# Patient Record
Sex: FEMALE | Race: WHITE | NOT HISPANIC OR LATINO | Employment: FULL TIME | URBAN - METROPOLITAN AREA
[De-identification: names, ages, dates, MRNs, and addresses within clinical notes are randomized per-mention and may not be internally consistent; named-entity substitution may affect disease eponyms.]

---

## 2017-01-30 ENCOUNTER — OFFICE VISIT (OUTPATIENT)
Dept: INTERNAL MEDICINE | Facility: CLINIC | Age: 32
End: 2017-01-30

## 2017-01-30 VITALS
SYSTOLIC BLOOD PRESSURE: 115 MMHG | HEIGHT: 65 IN | BODY MASS INDEX: 24.66 KG/M2 | WEIGHT: 148 LBS | TEMPERATURE: 98.8 F | HEART RATE: 83 BPM | DIASTOLIC BLOOD PRESSURE: 80 MMHG | OXYGEN SATURATION: 98 %

## 2017-01-30 DIAGNOSIS — D23.71 DERMATOFIBROMA OF LOWER LEG, RIGHT: ICD-10-CM

## 2017-01-30 DIAGNOSIS — F41.0 PANIC ATTACK: ICD-10-CM

## 2017-01-30 DIAGNOSIS — G43.109 MIGRAINE WITH AURA AND WITHOUT STATUS MIGRAINOSUS, NOT INTRACTABLE: ICD-10-CM

## 2017-01-30 DIAGNOSIS — F41.1 GAD (GENERALIZED ANXIETY DISORDER): ICD-10-CM

## 2017-01-30 DIAGNOSIS — E55.9 AVITAMINOSIS D: ICD-10-CM

## 2017-01-30 DIAGNOSIS — Z00.00 HEALTHCARE MAINTENANCE: Primary | ICD-10-CM

## 2017-01-30 PROBLEM — D23.70 DERMATOFIBROMA OF LOWER LEG: Status: ACTIVE | Noted: 2017-01-30

## 2017-01-30 PROCEDURE — 99395 PREV VISIT EST AGE 18-39: CPT | Performed by: INTERNAL MEDICINE

## 2017-01-30 PROCEDURE — 90656 IIV3 VACC NO PRSV 0.5 ML IM: CPT | Performed by: INTERNAL MEDICINE

## 2017-01-30 PROCEDURE — 90471 IMMUNIZATION ADMIN: CPT | Performed by: INTERNAL MEDICINE

## 2017-01-30 RX ORDER — DICLOFENAC POTASSIUM 50 MG/1
1 POWDER, FOR SOLUTION ORAL DAILY PRN
Qty: 1 EACH | Refills: 5
Start: 2017-01-30 | End: 2018-10-31

## 2017-01-30 NOTE — PROGRESS NOTES
"Annual Exam (review of medical issues )      HPI  Carito Ritter is a 31 y.o. female RTC in yearly CPE, review of medical issues:  1. Anxiety - started Effexor at last visit.  First 2 weeks had some fatigue and \"vision thing\" but all resolved at this point.  Used 4 or 5 doses of Ativan, \"especially that first week\".  \"I will always be an anxious person\".  Feels like doing better and notes that start of Effexor has \"been a good thing\".  No more panic attacks since start of med.   2. Migraine [place] - saw Dr. Beauchamp at the end of 2016.  Is on Cambia PRN at time of HA. \"Works really well about 90% of the time\".  On no daily meds for Ppx at this time.  Migraine frequency has been still holding at about 2x/ month.    3. Vitamin D deficiency - noted in past.  Off all vitamins at this time.        Review of Systems   Constitution: Negative for chills, fever, malaise/fatigue, weight gain and weight loss.   HENT: Positive for headaches. Negative for congestion, hearing loss, hoarse voice, odynophagia and sore throat.    Eyes: Negative for discharge, double vision, pain and redness.        Last eye exam today; wears glasses   Cardiovascular: Negative for chest pain, dyspnea on exertion, irregular heartbeat, leg swelling, near-syncope, palpitations and syncope.   Respiratory: Negative for cough and shortness of breath.    Skin: Positive for suspicious lesions (on B legs, \"knot underneath skin\".  Feel like bug bite, do not resolve. ). Negative for rash.   Musculoskeletal: Negative for joint pain, joint swelling, muscle cramps and muscle weakness.   Gastrointestinal: Negative for constipation, diarrhea, dysphagia, heartburn, nausea and vomiting.   Genitourinary: Negative for bladder incontinence, dysuria, frequency, hematuria and urgency.   Neurological: Negative for dizziness and light-headedness.   Psychiatric/Behavioral: Negative for depression. The patient is nervous/anxious. The patient does not have insomnia.        Problem " List:    Patient Active Problem List   Diagnosis   • DAPHNE (generalized anxiety disorder)   • Avitaminosis D   • Panic attack   • Healthcare maintenance   • Migraine without aura and responsive to treatment   • Dermatofibroma of lower leg   • Migraine with aura and without status migrainosus, not intractable       Medical History:    Past Medical History   Diagnosis Date   • Generalized anxiety disorder    • Hx of hordeolum      recurrent in L eye   • Migraine headache    • Vitamin D deficiency         Social History:    Social History     Social History   • Marital status:      Spouse name: N/A   • Number of children: 2   • Years of education: N/A     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     Social History Main Topics   • Smoking status: Never Smoker   • Smokeless tobacco: Not on file   • Alcohol use Yes      Comment: <1 wine week   • Drug use: No   • Sexual activity: Yes     Partners: Male      Comment:  only; no hx STD's     Other Topics Concern   • Not on file     Social History Narrative    Diet - overall healthy; eats fruits and veggies    Exercise - None       Family History:   Family History   Problem Relation Age of Onset   • Asthma Mother    • Hyperthyroidism Father    • Nephrolithiasis Father    • Diabetes Maternal Grandmother    • Lung cancer Maternal Grandfather    • Breast cancer Paternal Grandmother    • Diabetes Paternal Grandmother    • Hyperthyroidism Paternal Grandmother    • ADD / ADHD Son        Surgical History:   Past Surgical History   Procedure Laterality Date   • Breast surgery       cyst aspiration Right breast 2012   • Eye surgery       lasik   • External ear surgery       ear pining 2013 cosmetic   • Tonsillectomy and adenoidectomy       2005         Current Outpatient Prescriptions:   •  LORazepam (ATIVAN) 0.5 MG tablet, Take 1 tablet by mouth Every 8 (Eight) Hours As Needed for anxiety., Disp: 30 tablet, Rfl: 0  •  Melatonin 3 MG capsule, Take 1 capsule by  mouth., Disp: , Rfl:   •  venlafaxine XR (EFFEXOR XR) 150 MG 24 hr capsule, Take 1 capsule by mouth Daily., Disp: 30 capsule, Rfl: 5  •  Diclofenac Potassium 50 MG pack, Take 1 capsule by mouth Daily As Needed (migraine)., Disp: 1 each, Rfl: 5    Vitals:    01/30/17 1140   BP: 115/80   Pulse: 83   Temp: 98.8 °F (37.1 °C)   SpO2: 98%       Physical Exam   Constitutional: She is oriented to person, place, and time. She appears well-developed and well-nourished. No distress.   HENT:   Head: Normocephalic and atraumatic.   Right Ear: Hearing, tympanic membrane, external ear and ear canal normal.   Left Ear: Hearing, tympanic membrane, external ear and ear canal normal.   Nose: Nose normal.   Mouth/Throat: Uvula is midline, oropharynx is clear and moist and mucous membranes are normal.   Eyes: Conjunctivae, EOM and lids are normal. Pupils are equal, round, and reactive to light.   Neck: Trachea normal, normal range of motion and full passive range of motion without pain. Neck supple. Carotid bruit is not present. No thyroid mass and no thyromegaly present.   Cardiovascular: Normal rate, regular rhythm, S1 normal, S2 normal, normal heart sounds and intact distal pulses.  Exam reveals no gallop and no friction rub.    No murmur heard.  Pulses:       Radial pulses are 2+ on the right side, and 2+ on the left side.        Dorsalis pedis pulses are 2+ on the right side, and 2+ on the left side.        Posterior tibial pulses are 2+ on the right side, and 2+ on the left side.   Pulmonary/Chest: Effort normal and breath sounds normal. No respiratory distress. She has no wheezes. She has no rhonchi. She has no rales.   Abdominal: Soft. Normal appearance and bowel sounds are normal. She exhibits no distension and no mass. There is no hepatosplenomegaly. There is no tenderness. There is no rebound and no guarding.   Musculoskeletal: Normal range of motion. She exhibits no edema.       Vascular Status -  Her exam exhibits right  foot vasculature abnormal and no right foot edema. Her exam exhibits left foot vasculature abnormal and no left foot edema.  Lymphadenopathy:     She has no cervical adenopathy.     She has no axillary adenopathy.        Right: No inguinal adenopathy present.        Left: No inguinal adenopathy present.   Neurological: She is alert and oriented to person, place, and time. She has normal strength and normal reflexes. She displays no tremor. No cranial nerve deficit or sensory deficit. She exhibits normal muscle tone. Gait normal.   Skin: Skin is warm and dry. No rash noted.        Psychiatric: She has a normal mood and affect. Her behavior is normal. Cognition and memory are normal.   Vitals reviewed.      Assessment/ Plan  Diagnoses and all orders for this visit:    Healthcare maintenance  -     UA / M With / Rflx Culture([facility] ONLY)    Panic attack    Migraine with aura and without status migrainosus, not intractable  -     Diclofenac Potassium 50 MG pack; Take 1 capsule by mouth Daily As Needed (migraine).    Avitaminosis D    DAPHNE (generalized anxiety disorder)    Dermatofibroma of lower leg, right    Other orders  -     Melatonin 3 MG capsule; Take 1 capsule by mouth.  -     Flu Vaccine Greater Than or Equal To 4yo Preservative Free IM        Return in about 1 year (around 1/30/2018) for Annual physical.      Discussion:  Carito Ritter is a 31 y.o. female RTC in yearly CPE, review of medical issues:  1. Anxiety - recurrence of anxiety in 2016 with new job, with prior diagnosis n past (with primary sx of SOA with (-) CT scan chest and PFT's and resolution with anxiety tx); recurrence with panic attacks. Doing well on Effexor with only mild initiation side effects, now resolved. B/P OK today.  Rare use of Ativan at this point and pt is pleased with progress. C/W same meds for now.     2. Migraine HA -  S/p Neuro eval with Dr. Beauchamp at the end of 2016.Cambia PRN with good results with abortive strategy.  Recalls intolerance to daily Ppx meds in past.  3. Vitamin D deficiency - trend levels with next labs.   4. Recurrent hordeolum - saw optho today for re-eval. Noted think cornea and scar tissue in eyelids on L.    5. Dermatofibroma - noted on B legs with classic evolution noted on R leg lesion.  Reassured pt.   6. HM - labs d/w pt; Flu - today; Tdap UTD 2015; Pap/ Breast Exam UTD 12/2016 with Gyn; add exercise.     RTC one year CPE, F labs prior

## 2017-01-30 NOTE — MR AVS SNAPSHOT
Carito Ritter   1/30/2017 11:20 AM   Office Visit    Dept Phone:  [phone]   Encounter #:  [id number]    Provider:  Karl Snyder MD   Department:  [facility] INTERNAL MEDICINE                Your Full Care Plan              Today's Medication Changes          These changes are accurate as of: 1/30/17 12:36 PM.  If you have any questions, ask your nurse or doctor.               New Medication(s)Ordered:     Diclofenac Potassium 50 MG pack   Take 1 capsule by mouth Daily As Needed (migraine).   Started by:  Karl Snyder MD         Medication(s)that have changed:     venlafaxine  MG 24 hr capsule   Commonly known as:  EFFEXOR XR   Take 1 capsule by mouth Daily.   What changed:  Another medication with the same name was removed. Continue taking this medication, and follow the directions you see here.   Changed by:  Cynthia Arboleda MA         Stop taking medication(s)listed here:     sulfamethoxazole-trimethoprim 800-160 MG per tablet   Commonly known as:  BACTRIM DS,SEPTRA DS   Stopped by:  Karl Snyder MD                Where to Get Your Medications      Information about where to get these medications is not yet available     ! Ask your nurse or doctor about these medications     Diclofenac Potassium 50 MG pack                  Your Updated Medication List          This list is accurate as of: 1/30/17 12:36 PM.  Always use your most recent med list.                Diclofenac Potassium 50 MG pack   Take 1 capsule by mouth Daily As Needed (migraine).       LORazepam 0.5 MG tablet   Commonly known as:  ATIVAN   Take 1 tablet by mouth Every 8 (Eight) Hours As Needed for anxiety.       Melatonin 3 MG capsule       venlafaxine  MG 24 hr capsule   Commonly known as:  EFFEXOR XR   Take 1 capsule by mouth Daily.               We Performed the Following     Flu Vaccine Greater Than or Equal To 4yo Preservative Free IM     UA / M With / Rflx Culture([facility]  ONLY)       You Were Diagnosed With        Codes Comments    Healthcare maintenance    -  Primary ICD-10-CM: Z00.00  ICD-9-CM: V70.0     Panic attack     ICD-10-CM: F41.0  ICD-9-CM: 300.01     Migraine with aura and without status migrainosus, not intractable     ICD-10-CM: G43.109  ICD-9-CM: 346.00     Avitaminosis D     ICD-10-CM: E55.9  ICD-9-CM: 268.9     DAPHNE (generalized anxiety disorder)     ICD-10-CM: F41.1  ICD-9-CM: 300.02     Dermatofibroma of lower leg, right     ICD-10-CM: D23.71  ICD-9-CM: 216.7       Instructions     None    Patient Instructions History      Upcoming Appointments     Visit Type Date Time Department    PHYSICAL 1/30/2017 11:20 AM [facility]    [facility] 1/26/2018  8:10 AM [facility]      MyChart Signup     Our records indicate that you have an active [email or web address] account.    You can view your After Visit Summary by going to [email or web address] and logging in with your [employer] username and password.  If you don't have a [employer] username and password but a parent or guardian has access to your record, the parent or guardian should login with their own [employer] username and password and access your record to view the After Visit Summary.    If you have questions, you can email [email or web address] or call [phone] to talk to our [employer] staff.  Remember, [employer] is NOT to be used for urgent needs.  For medical emergencies, dial 911.               Other Info from Your Visit           Your Appointments     Jan 26, 2018  8:10 AM EST   [facility] with [facility] YANNA WILSON   [facility] INTERNAL MEDICINE (--)    [address] [id number] [phone]            Feb 02, 2018  3:20 PM EST   Physical with Karl Snyder MD   [facility] INTERNAL MEDICINE (--)    [address] [id number] [phone]           Arrive 15 minutes prior to appointment.              Allergies     No  "Known Allergies      Reason for Visit     Annual Exam review of medical issues       Vital Signs     Blood Pressure Pulse Temperature Height Weight Oxygen Saturation    115/80 83 98.8 °F (37.1 °C) 65\" (165.1 cm) 148 lb (67.1 kg) 98%    Breastfeeding? Body Mass Index Smoking Status             No 24.63 kg/m2 Never Smoker         Problems and Diagnoses Noted     Avitaminosis D    Benign skin tumor    DAPHNE (generalized anxiety disorder)    Routine medical exam    Migraine without aura and responsive to treatment    Panic attack    Migraines            "

## 2017-01-31 LAB
APPEARANCE UR: CLEAR
BACTERIA #/AREA URNS HPF: NORMAL /HPF
BILIRUB UR QL STRIP: NEGATIVE
COLOR UR: YELLOW
EPI CELLS #/AREA URNS HPF: NORMAL /HPF
GLUCOSE UR QL: NEGATIVE
HGB UR QL STRIP: NEGATIVE
KETONES UR QL STRIP: NEGATIVE
LEUKOCYTE ESTERASE UR QL STRIP: NEGATIVE
MICRO URNS: NORMAL
MICRO URNS: NORMAL
MUCOUS THREADS URNS QL MICRO: PRESENT /HPF
NITRITE UR QL STRIP: NEGATIVE
PH UR STRIP: 7 [PH] (ref 5–7.5)
PROT UR QL STRIP: NEGATIVE
RBC #/AREA URNS HPF: NORMAL /HPF
SP GR UR: 1.02 (ref 1–1.03)
URINALYSIS REFLEX: NORMAL
UROBILINOGEN UR STRIP-MCNC: 0.2 MG/DL (ref 0.2–1)
WBC #/AREA URNS HPF: NORMAL /HPF

## 2017-06-15 RX ORDER — VENLAFAXINE HYDROCHLORIDE 150 MG/1
CAPSULE, EXTENDED RELEASE ORAL
Qty: 30 CAPSULE | Refills: 3 | Status: SHIPPED | OUTPATIENT
Start: 2017-06-15 | End: 2017-11-09 | Stop reason: SDUPTHER

## 2017-08-02 ENCOUNTER — TELEPHONE (OUTPATIENT)
Dept: INTERNAL MEDICINE | Facility: CLINIC | Age: 32
End: 2017-08-02

## 2017-08-02 NOTE — TELEPHONE ENCOUNTER
Response is not immediate, but usually in first hour or so of taking med.  May try to double dose to 1mg and see if gets more robust effect.   Call if not working better and can consider alternate options.

## 2017-08-02 NOTE — TELEPHONE ENCOUNTER
Pt is calling and asking about the ativan. She has taken 4 in the last week and she feels like they are taking about 3-4 hours to work and for her anxiety to get better. She is wanting to know if she is taking them wrong or what the release time on this medication is.

## 2017-09-29 ENCOUNTER — OFFICE VISIT (OUTPATIENT)
Dept: INTERNAL MEDICINE | Facility: CLINIC | Age: 32
End: 2017-09-29

## 2017-09-29 VITALS
TEMPERATURE: 98.2 F | SYSTOLIC BLOOD PRESSURE: 122 MMHG | BODY MASS INDEX: 23.16 KG/M2 | DIASTOLIC BLOOD PRESSURE: 80 MMHG | HEIGHT: 65 IN | HEART RATE: 85 BPM | OXYGEN SATURATION: 98 % | WEIGHT: 139 LBS

## 2017-09-29 DIAGNOSIS — G89.29 CHRONIC ABDOMINAL PAIN: ICD-10-CM

## 2017-09-29 DIAGNOSIS — R10.9 CHRONIC ABDOMINAL PAIN: ICD-10-CM

## 2017-09-29 DIAGNOSIS — R10.31 RIGHT LOWER QUADRANT ABDOMINAL PAIN: Primary | ICD-10-CM

## 2017-09-29 LAB
BILIRUB BLD-MCNC: NEGATIVE MG/DL
CLARITY, POC: CLEAR
COLOR UR: YELLOW
GLUCOSE UR STRIP-MCNC: NEGATIVE MG/DL
HCT VFR BLDA CALC: 40.3 %
HGB BLDA-MCNC: 13.2 G/DL
KETONES UR QL: NEGATIVE
LEUKOCYTE EST, POC: NEGATIVE
LYMPHOCYTES # BLD: 35.5 %
MCH, POC: 30.4
MCHC, POC: 32.8
MCV, POC: 92.6
MONOCYTES # BLD: 6.8 %
NITRITE UR-MCNC: NEGATIVE MG/ML
PH UR: 6.5 [PH] (ref 5–8)
PLATELET # BLD: 277 10*3/MM3
PMV BLD: 7.2 FL
POC NEUTROPHIL: 57.7 %
PROT UR STRIP-MCNC: NEGATIVE MG/DL
RBC # UR STRIP: NEGATIVE /UL
RBC, POC: 4.35
RDW, POC: 15.3
SP GR UR: 1.01 (ref 1–1.03)
UROBILINOGEN UR QL: NORMAL
WBC # BLD: 5.4 10*3/UL

## 2017-09-29 PROCEDURE — 85025 COMPLETE CBC W/AUTO DIFF WBC: CPT | Performed by: INTERNAL MEDICINE

## 2017-09-29 PROCEDURE — 81003 URINALYSIS AUTO W/O SCOPE: CPT | Performed by: INTERNAL MEDICINE

## 2017-09-29 PROCEDURE — 99214 OFFICE O/P EST MOD 30 MIN: CPT | Performed by: INTERNAL MEDICINE

## 2017-09-29 PROCEDURE — 74020 XR ABDOMEN FLAT AND UPRIGHT: CPT | Performed by: INTERNAL MEDICINE

## 2017-09-29 NOTE — PROGRESS NOTES
"Abdominal Pain (right lower )      HPI  Carito Ritter is a 32 y.o. female RTC in acute care:    \"I have had this going on for a couple of months\". Feels like \"for a couple weeks each month has R lower pelvic discomfort\" but has gotten worse each month.  Pain peaked last week and could not go to sleep due to pain, \"even after ibuprofen\".  Went to Gyn and had pelvic exam and U/S and \"there was nothing\".   Told to come here and see if can look at issue and will consider birth control if nothing else turns up.  No bowel changes during flare up of pain.  Pain will totally resolve when on period, for ~ 2 weeks.  Pain is like a \"stabbing, comes in waves\". Pain is in R lower part of abdomen only.  No positional nature.  Never had rash over area.  No N/V sx with it.  Not related to food. Pain is \"all day\" consistently when occurring.  Cannot figure out anything that makes it better.   Did \"In Shape\" diet in the last month where used hcg injections and on \"strict diet\" so bowels have been off for the last month. Pain started before ever starting the hcg diet.   Recalls pain was present when had U/S and pressure over area in that region.   Pain is \"not as bad today\" but is present.     Review of Systems   Constitution: Positive for malaise/fatigue (feels poorly when pain occurs.  'Feels drained and crappy:\". ). Negative for chills, decreased appetite and fever.   HENT: Negative for odynophagia.    Skin: Negative for rash.   Gastrointestinal: Positive for change in bowel habit (in last month on hcg diet; is not daily at this point. ). Negative for constipation, diarrhea, dysphagia, heartburn, nausea and vomiting.   Genitourinary: Negative for dysuria, frequency and hematuria.       The following portions of the patient's history were reviewed and updated as appropriate: allergies, current medications, past medical history and problem list.      Current Outpatient Prescriptions:   •  Diclofenac Potassium 50 MG pack, Take 1 " "capsule by mouth Daily As Needed (migraine)., Disp: 1 each, Rfl: 5  •  LORazepam (ATIVAN) 0.5 MG tablet, Take 1 tablet by mouth Every 8 (Eight) Hours As Needed for anxiety., Disp: 30 tablet, Rfl: 0  •  Melatonin 3 MG capsule, Take 1 capsule by mouth., Disp: , Rfl:   •  venlafaxine XR (EFFEXOR-XR) 150 MG 24 hr capsule, TAKE 1 CAPSULE BY MOUTH DAILY, Disp: 30 capsule, Rfl: 3    Vitals:    09/29/17 0847   BP: 122/80   Pulse: 85   Temp: 98.2 °F (36.8 °C)   SpO2: 98%   Weight: 139 lb (63 kg)   Height: 65\" (165.1 cm)         Physical Exam   Constitutional: She is oriented to person, place, and time. She appears well-developed and well-nourished. She does not have a sickly appearance. She does not appear ill. No distress.   HENT:   Head: Normocephalic and atraumatic.   Mouth/Throat: Oropharynx is clear and moist. No oropharyngeal exudate.   Eyes: Conjunctivae are normal. Pupils are equal, round, and reactive to light. Right eye exhibits no discharge. Left eye exhibits no discharge. No scleral icterus.   Cardiovascular: Normal rate, regular rhythm and normal heart sounds.    Pulmonary/Chest: Effort normal and breath sounds normal. No respiratory distress. She has no wheezes. She has no rales.   Abdominal: Soft. Bowel sounds are normal. She exhibits no distension and no mass. There is no hepatomegaly. There is tenderness in the right lower quadrant. There is no rebound, no guarding, no CVA tenderness and negative Fragoso's sign.   Musculoskeletal: Normal range of motion. She exhibits no edema.   Neurological: She is alert and oriented to person, place, and time. No cranial nerve deficit.   Skin: No rash (over torso and back) noted.   Psychiatric: She has a normal mood and affect. Her behavior is normal.   Vitals reviewed.    Results for orders placed or performed in visit on 09/29/17   POC CBC With / Auto Diff   Result Value Ref Range    WBC 5.4     RBC 4.35     Hemoglobin 13.2 g/dL    Hematocrit 40.3 %    MCV 92.6     MCH " "30.4     MCHC 32.8     RDW-CV 15.3     MPV 7.2     Platelets 277 10*3/mm3    Neutrophil Rel % 57.7 %    Monocyte Rel % 6.8 %    Lymphocyte Rel % 35.5 %   POC Urinalysis Dipstick, Automated   Result Value Ref Range    Color Yellow Yellow, Straw, Dark Yellow, Janay    Clarity, UA Clear Clear    Glucose, UA Negative Negative, 1000 mg/dL (3+) mg/dL    Bilirubin Negative Negative    Ketones, UA Negative Negative    Specific Gravity  1.015 1.005 - 1.030    Blood, UA Negative Negative    pH, Urine 6.5 5.0 - 8.0    Protein, POC Negative Negative mg/dL    Urobilinogen, UA Normal Normal    Leukocytes Negative Negative    Nitrite, UA Negative Negative     XR abdomen: RLQ pain, normal pelvix U/S; No prior for comparison  Normal stool and bowel gas pattern  No air fluid levels  No noted masses.     Assessment/ Plan  Diagnoses and all orders for this visit:    Right lower quadrant abdominal pain  -     POC CBC With / Auto Diff  -     POC Urinalysis Dipstick, Automated  -     Comprehensive Metabolic Panel  -     Sedimentation Rate  -     Urine Culture - Urine, Urine, Clean Catch  -     XR Abdomen Flat & Upright (In Office)  -     CT abdomen pelvis w contrast; Future    Chronic abdominal pain  -     CT abdomen pelvis w contrast; Future        Return for Next scheduled follow up.      Discussion:   Carito Ritter is a 32 y.o. female RTC In acute care (new issue to examiner) with > 3 months hx of cyclical (following menstruation cycle) 2 week course of RLQ pain abdominal/ pelvic pain that preceded onset of recent restrictive \"hcg\" diet.  Saw Gyn yesterday and had (-) pelvic U/S and normal exam.  Exam here is benign except noted RLQ pain on deep palpation without rebound or guarding.  I d/w pt that given duration and recent (-) U/S do think the next step is more definitive imaging of A/P. If CT is unrevealing, will need to see GI for endoscopy consideration and R colon lesion is on Ddx. We discussed endometriosis on Ddx though " seems less likely with duration of pain and (-) U/S.  Will f/u with pt via phone after imaging.   F/U after additional labs as noted above.

## 2017-10-01 LAB
ALBUMIN SERPL-MCNC: 5 G/DL (ref 3.5–5.2)
ALBUMIN/GLOB SERPL: 1.7 G/DL
ALP SERPL-CCNC: 42 U/L (ref 39–117)
ALT SERPL-CCNC: 24 U/L (ref 1–33)
AST SERPL-CCNC: 18 U/L (ref 1–32)
BACTERIA UR CULT: NO GROWTH
BACTERIA UR CULT: NORMAL
BILIRUB SERPL-MCNC: 0.5 MG/DL (ref 0.1–1.2)
BUN SERPL-MCNC: 12 MG/DL (ref 6–20)
BUN/CREAT SERPL: 19.7 (ref 7–25)
CALCIUM SERPL-MCNC: 10.2 MG/DL (ref 8.6–10.5)
CHLORIDE SERPL-SCNC: 102 MMOL/L (ref 98–107)
CO2 SERPL-SCNC: 26.9 MMOL/L (ref 22–29)
CREAT SERPL-MCNC: 0.61 MG/DL (ref 0.57–1)
ERYTHROCYTE [SEDIMENTATION RATE] IN BLOOD BY WESTERGREN METHOD: 9 MM/HR (ref 0–20)
GLOBULIN SER CALC-MCNC: 3 GM/DL
GLUCOSE SERPL-MCNC: 77 MG/DL (ref 65–99)
POTASSIUM SERPL-SCNC: 4.2 MMOL/L (ref 3.5–5.2)
PROT SERPL-MCNC: 8 G/DL (ref 6–8.5)
SODIUM SERPL-SCNC: 141 MMOL/L (ref 136–145)

## 2017-10-06 ENCOUNTER — HOSPITAL ENCOUNTER (OUTPATIENT)
Dept: CT IMAGING | Facility: HOSPITAL | Age: 32
Discharge: HOME | End: 2017-10-06
Admitting: INTERNAL MEDICINE

## 2017-10-06 DIAGNOSIS — G89.29 CHRONIC ABDOMINAL PAIN: ICD-10-CM

## 2017-10-06 DIAGNOSIS — R10.9 CHRONIC ABDOMINAL PAIN: ICD-10-CM

## 2017-10-06 DIAGNOSIS — R10.31 RIGHT LOWER QUADRANT ABDOMINAL PAIN: ICD-10-CM

## 2017-10-06 DIAGNOSIS — R10.31 RLQ ABDOMINAL PAIN: Primary | ICD-10-CM

## 2017-10-06 PROCEDURE — 0 IOPAMIDOL 61 % SOLUTION: Performed by: INTERNAL MEDICINE

## 2017-10-06 PROCEDURE — 74177 CT ABD & PELVIS W/CONTRAST: CPT

## 2017-10-06 RX ADMIN — IOPAMIDOL 85 ML: 612 INJECTION, SOLUTION INTRAVENOUS at 07:24

## 2017-10-11 RX ORDER — VENLAFAXINE HYDROCHLORIDE 150 MG/1
CAPSULE, EXTENDED RELEASE ORAL
Qty: 30 CAPSULE | Refills: 0 | Status: SHIPPED | OUTPATIENT
Start: 2017-10-11 | End: 2018-01-30

## 2017-11-09 RX ORDER — VENLAFAXINE HYDROCHLORIDE 150 MG/1
CAPSULE, EXTENDED RELEASE ORAL
Qty: 30 CAPSULE | Refills: 3 | Status: SHIPPED | OUTPATIENT
Start: 2017-11-09 | End: 2018-03-08 | Stop reason: SDUPTHER

## 2017-12-14 ENCOUNTER — OFFICE VISIT (OUTPATIENT)
Dept: GASTROENTEROLOGY | Facility: CLINIC | Age: 32
End: 2017-12-14

## 2017-12-14 VITALS
HEIGHT: 65 IN | SYSTOLIC BLOOD PRESSURE: 116 MMHG | WEIGHT: 135.2 LBS | TEMPERATURE: 98.5 F | BODY MASS INDEX: 22.53 KG/M2 | DIASTOLIC BLOOD PRESSURE: 72 MMHG

## 2017-12-14 DIAGNOSIS — K59.00 CONSTIPATION, UNSPECIFIED CONSTIPATION TYPE: ICD-10-CM

## 2017-12-14 DIAGNOSIS — R10.31 RLQ ABDOMINAL PAIN: Primary | ICD-10-CM

## 2017-12-14 PROCEDURE — 99203 OFFICE O/P NEW LOW 30 MIN: CPT | Performed by: INTERNAL MEDICINE

## 2017-12-14 RX ORDER — CLONAZEPAM 0.5 MG/1
0.5 TABLET ORAL NIGHTLY PRN
COMMUNITY
Start: 2017-12-12 | End: 2019-03-04 | Stop reason: SDUPTHER

## 2017-12-14 NOTE — PROGRESS NOTES
Chief Complaint   Patient presents with   • Abdominal Pain   • Constipation     alt diarrhea   • Nausea     Carito Ritter is a 32 y.o. female who presents with RLQ pain.  Intermittent - comes and goes.  Once it starts it persists for days to week.  Feels like a strong cramp.  Can wake her from sleep.  Worse after a BM.  No blood in stool.  Feels more constipated and then she will have diarrhea once she starts to have BM.  Feelsbloated without BM.  Tried OTC stool softeners without persistant results.  She felt better after using mag citrate once.  Used smooth move tea but it only worked for a little bit.  No FH GI issues.  Lost 20 lbs with effort this past summer.She had a CAT scan done to evaluate her symptoms which was normal.  She also saw a gynecologist and had a transvaginal ultrasound which showed no issues with her ovaries.        HPI  Past Medical History:   Diagnosis Date   • Generalized anxiety disorder    • Hx of hordeolum     recurrent in L eye   • Migraine headache    • Vitamin D deficiency      Past Surgical History:   Procedure Laterality Date   • BREAST SURGERY      cyst aspiration Right breast 2012   • EXTERNAL EAR SURGERY      ear pining 2013 cosmetic   • EYE SURGERY      lasik   • TONSILLECTOMY AND ADENOIDECTOMY      2005       Current Outpatient Prescriptions:   •  clonazePAM (KlonoPIN) 0.5 MG tablet, Take 0.5 mg by mouth., Disp: , Rfl:   •  Diclofenac Potassium 50 MG pack, Take 1 capsule by mouth Daily As Needed (migraine)., Disp: 1 each, Rfl: 5  •  Melatonin 3 MG capsule, Take 1 capsule by mouth., Disp: , Rfl:   •  venlafaxine XR (EFFEXOR-XR) 150 MG 24 hr capsule, TAKE 1 CAPSULE BY MOUTH DAILY, Disp: 30 capsule, Rfl: 0  •  venlafaxine XR (EFFEXOR-XR) 150 MG 24 hr capsule, Take one capsule daily, Disp: 30 capsule, Rfl: 3  •  linaclotide (LINZESS) 72 MCG capsule capsule, Take 1 capsule by mouth Every Morning Before Breakfast., Disp: 30 capsule, Rfl: 5  •  LORazepam (ATIVAN) 0.5 MG tablet,  Take 1 tablet by mouth Every 8 (Eight) Hours As Needed for anxiety., Disp: 30 tablet, Rfl: 0  No Known Allergies  Social History     Social History   • Marital status:      Spouse name: N/A   • Number of children: 2   • Years of education: N/A     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     Social History Main Topics   • Smoking status: Never Smoker   • Smokeless tobacco: Never Used   • Alcohol use Yes      Comment: <1 wine week   • Drug use: No   • Sexual activity: Yes     Partners: Male      Comment:  only; no hx STD's     Other Topics Concern   • Not on file     Social History Narrative    Diet - overall healthy; eats fruits and veggies    Exercise - None     Family History   Problem Relation Age of Onset   • Asthma Mother    • Hyperthyroidism Father    • Nephrolithiasis Father    • Diabetes Maternal Grandmother    • Lung cancer Maternal Grandfather    • Breast cancer Paternal Grandmother    • Diabetes Paternal Grandmother    • Hyperthyroidism Paternal Grandmother    • ADD / ADHD Son      Review of Systems   Constitutional: Negative for appetite change and unexpected weight change.   Gastrointestinal: Positive for abdominal distention, abdominal pain and constipation.   All other systems reviewed and are negative.    Vitals:    12/14/17 0955   BP: 116/72   Temp: 98.5 °F (36.9 °C)     Last Weight    12/14/17  0955   Weight: 61.3 kg (135 lb 3.2 oz)     Physical Exam   Constitutional: She is oriented to person, place, and time. She appears well-developed and well-nourished.   HENT:   Head: Normocephalic and atraumatic.   Eyes: Conjunctivae are normal. No scleral icterus.   Neck: Normal range of motion. Neck supple.   Pulmonary/Chest: Effort normal.   Abdominal: Soft. Bowel sounds are normal. She exhibits distension. There is tenderness.   Mildly distended, tender in the right left lower quadrant.  Focal tenderness just above right hip bone   Musculoskeletal: She exhibits no edema.    Neurological: She is alert and oriented to person, place, and time.   Skin: Skin is warm and dry.   Psychiatric: She has a normal mood and affect.   Nursing note and vitals reviewed.    No images are attached to the encounter.  No notes on file  Carito was seen today for abdominal pain, constipation and nausea.    Diagnoses and all orders for this visit:    RLQ abdominal pain  -     Case Request; Standing  -     Case Request    Constipation, unspecified constipation type    Other orders  -     Implement Anesthesia orders day of procedure.; Standing  -     Obtain informed consent; Standing  -     Verify bowel prep was successful; Standing  -     Give tap water enema if bowel prep was insufficient; Standing  -     linaclotide (LINZESS) 72 MCG capsule capsule; Take 1 capsule by mouth Every Morning Before Breakfast.    Plan-  Trial low-dose Linzess for constipation-she has failed over-the-counter medications  Plan for colonoscopy for further evaluation of her right lower quadrant pain-her CT was normal which is reassuring however she's having fairly persistent pain in a focal location that needs further evaluation.

## 2018-01-05 ENCOUNTER — OFFICE VISIT (OUTPATIENT)
Dept: RETAIL CLINIC | Facility: CLINIC | Age: 33
End: 2018-01-05

## 2018-01-05 VITALS
SYSTOLIC BLOOD PRESSURE: 90 MMHG | RESPIRATION RATE: 18 BRPM | HEART RATE: 90 BPM | DIASTOLIC BLOOD PRESSURE: 70 MMHG | TEMPERATURE: 99.4 F | OXYGEN SATURATION: 98 %

## 2018-01-05 DIAGNOSIS — J02.0 STREP THROAT: Primary | ICD-10-CM

## 2018-01-05 LAB
EXPIRATION DATE: ABNORMAL
INTERNAL CONTROL: ABNORMAL
Lab: ABNORMAL
S PYO AG THROAT QL: POSITIVE

## 2018-01-05 PROCEDURE — 99213 OFFICE O/P EST LOW 20 MIN: CPT | Performed by: NURSE PRACTITIONER

## 2018-01-05 PROCEDURE — 87880 STREP A ASSAY W/OPTIC: CPT | Performed by: NURSE PRACTITIONER

## 2018-01-05 RX ORDER — AMOXICILLIN 875 MG/1
875 TABLET, COATED ORAL EVERY 12 HOURS SCHEDULED
Qty: 20 TABLET | Refills: 0 | Status: SHIPPED | OUTPATIENT
Start: 2018-01-05 | End: 2018-01-15

## 2018-01-05 NOTE — PROGRESS NOTES
Subjective   Patient ID: Carito Ritter is a 32 y.o. female presents with   Chief Complaint   Patient presents with   • Sore Throat     x 3 days   • Fever     100.2       Sore Throat    This is a new problem. The current episode started in the past 7 days (2-3d). The problem has been gradually worsening. The pain is worse on the right side. The maximum temperature recorded prior to her arrival was 100.4 - 100.9 F. The pain is mild. Associated symptoms include trouble swallowing. Pertinent negatives include no congestion or headaches. She has had exposure to strep. She has had no exposure to mono. Treatments tried: cough drops. The treatment provided mild relief.       No Known Allergies    The following portions of the patient's history were reviewed and updated as appropriate: allergies, current medications, past family history, past medical history, past social history, past surgical history and problem list.      Review of Systems   Constitutional: Positive for chills, diaphoresis, fatigue and fever.   HENT: Positive for postnasal drip, sore throat and trouble swallowing. Negative for congestion, rhinorrhea, sinus pain, sinus pressure and sneezing.    Respiratory: Negative.    Cardiovascular: Negative.    Gastrointestinal: Negative.    Neurological: Negative for headaches.       Objective     Vitals:    01/05/18 1716   BP: 90/70   Pulse: 90   Resp: 18   Temp: 99.4 °F (37.4 °C)   SpO2: 98%         Physical Exam   Constitutional: She is oriented to person, place, and time. She appears well-developed and well-nourished. She does not appear ill. No distress.   HENT:   Head: Normocephalic.   Right Ear: Hearing, tympanic membrane, external ear and ear canal normal.   Left Ear: Hearing, tympanic membrane, external ear and ear canal normal.   Nose: No mucosal edema, rhinorrhea or sinus tenderness.   Mouth/Throat: Mucous membranes are normal. Posterior oropharyngeal erythema present. Tonsils are 0 on the right.  Tonsils are 0 on the left. No tonsillar exudate.   Clear blister to left tonsillar area   Eyes: Conjunctivae are normal.   Sclera white.   Neck: No tracheal deviation present.   Cardiovascular: Normal rate, regular rhythm, S1 normal, S2 normal and normal heart sounds.    Pulmonary/Chest: Effort normal and breath sounds normal. No accessory muscle usage. No respiratory distress.   Abdominal: Soft. Bowel sounds are normal. There is no tenderness.   Lymphadenopathy:     She has no cervical adenopathy.   Neurological: She is alert and oriented to person, place, and time.   Skin: Skin is warm and dry.   Vitals reviewed.    Lab Results   Component Value Date    RAPSCRN Positive (A) 01/05/2018         Carito was seen today for sore throat and fever.    Diagnoses and all orders for this visit:    Strep throat  -     POC Rapid Strep A  -     amoxicillin (AMOXIL) 875 MG tablet; Take 1 tablet by mouth Every 12 (Twelve) Hours for 10 days.        Follow-up with Primary Care Physician in 48-72 hours if these symptoms worsen or fail to improve as anticipated. Patient verbalizes understanding.    Strep Throat  Strep throat is a bacterial infection of the throat. Your health care provider may call the infection tonsillitis or pharyngitis, depending on whether there is swelling in the tonsils or at the back of the throat. Strep throat is most common during the cold months of the year in children who are 5-15 years of age, but it can happen during any season in people of any age. This infection is spread from person to person (contagious) through coughing, sneezing, or close contact.  CAUSES  Strep throat is caused by the bacteria called Streptococcus pyogenes.  RISK FACTORS  This condition is more likely to develop in:  · People who spend time in crowded places where the infection can spread easily.  · People who have close contact with someone who has strep throat.  SYMPTOMS  Symptoms of this condition include:  · Fever or chills.     · Redness, swelling, or pain in the tonsils or throat.  · Pain or difficulty when swallowing.  · White or yellow spots on the tonsils or throat.  · Swollen, tender glands in the neck or under the jaw.  · Red rash all over the body (rare).  DIAGNOSIS  This condition is diagnosed by performing a rapid strep test or by taking a swab of your throat (throat culture test). Results from a rapid strep test are usually ready in a few minutes, but throat culture test results are available after one or two days.  TREATMENT  This condition is treated with antibiotic medicine.  HOME CARE INSTRUCTIONS  Medicines  · Take over-the-counter and prescription medicines only as told by your health care provider.  · Take your antibiotic as told by your health care provider. Do not stop taking the antibiotic even if you start to feel better.  · Have family members who also have a sore throat or fever tested for strep throat. They may need antibiotics if they have the strep infection.  Eating and Drinking  · Do not share food, drinking cups, or personal items that could cause the infection to spread to other people.  · If swallowing is difficult, try eating soft foods until your sore throat feels better.  · Drink enough fluid to keep your urine clear or pale yellow.  General Instructions  · Gargle with a salt-water mixture 3-4 times per day or as needed. To make a salt-water mixture, completely dissolve ½-1 tsp of salt in 1 cup of warm water.  · Make sure that all household members wash their hands well.  · Get plenty of rest.  · Stay home from school or work until you have been taking antibiotics for 24 hours.  · Keep all follow-up visits as told by your health care provider. This is important.  SEEK MEDICAL CARE IF:  · The glands in your neck continue to get bigger.  · You develop a rash, cough, or earache.  · You cough up a thick liquid that is green, yellow-brown, or bloody.  · You have pain or discomfort that does not get better  with medicine.  · Your problems seem to be getting worse rather than better.  · You have a fever.  SEEK IMMEDIATE MEDICAL CARE IF:  · You have new symptoms, such as vomiting, severe headache, stiff or painful neck, chest pain, or shortness of breath.  · You have severe throat pain, drooling, or changes in your voice.  · You have swelling of the neck, or the skin on the neck becomes red and tender.  · You have signs of dehydration, such as fatigue, dry mouth, and decreased urination.  · You become increasingly sleepy, or you cannot wake up completely.  · Your joints become red or painful.     This information is not intended to replace advice given to you by your health care provider. Make sure you discuss any questions you have with your health care provider.     Document Released: 12/15/2001 Document Revised: 09/07/2016 Document Reviewed: 04/11/2016  [employer] Patient Education ©2017 [employer]

## 2018-01-05 NOTE — PATIENT INSTRUCTIONS

## 2018-01-25 DIAGNOSIS — Z00.00 HEALTHCARE MAINTENANCE: Primary | ICD-10-CM

## 2018-01-25 DIAGNOSIS — E55.9 AVITAMINOSIS D: ICD-10-CM

## 2018-01-27 LAB
25(OH)D3+25(OH)D2 SERPL-MCNC: 28.7 NG/ML (ref 30–100)
ALBUMIN SERPL-MCNC: 5 G/DL (ref 3.5–5.2)
ALBUMIN/GLOB SERPL: 1.6 G/DL
ALP SERPL-CCNC: 43 U/L (ref 39–117)
ALT SERPL-CCNC: 29 U/L (ref 1–33)
APPEARANCE UR: CLEAR
AST SERPL-CCNC: 18 U/L (ref 1–32)
BACTERIA #/AREA URNS HPF: NORMAL /HPF
BASOPHILS # BLD AUTO: 0.01 10*3/MM3 (ref 0–0.2)
BASOPHILS NFR BLD AUTO: 0.2 % (ref 0–1.5)
BILIRUB SERPL-MCNC: 0.6 MG/DL (ref 0.1–1.2)
BILIRUB UR QL STRIP: NEGATIVE
BUN SERPL-MCNC: 16 MG/DL (ref 6–20)
BUN/CREAT SERPL: 24.2 (ref 7–25)
CALCIUM SERPL-MCNC: 9.8 MG/DL (ref 8.6–10.5)
CHLORIDE SERPL-SCNC: 101 MMOL/L (ref 98–107)
CHOLEST SERPL-MCNC: 178 MG/DL (ref 0–200)
CO2 SERPL-SCNC: 26 MMOL/L (ref 22–29)
COLOR UR: YELLOW
CREAT SERPL-MCNC: 0.66 MG/DL (ref 0.57–1)
EOSINOPHIL # BLD AUTO: 0.05 10*3/MM3 (ref 0–0.7)
EOSINOPHIL NFR BLD AUTO: 0.9 % (ref 0.3–6.2)
EPI CELLS #/AREA URNS HPF: NORMAL /HPF
ERYTHROCYTE [DISTWIDTH] IN BLOOD BY AUTOMATED COUNT: 13.1 % (ref 11.7–13)
GFR SERPLBLD CREATININE-BSD FMLA CKD-EPI: 103 ML/MIN/1.73
GFR SERPLBLD CREATININE-BSD FMLA CKD-EPI: 125 ML/MIN/1.73
GLOBULIN SER CALC-MCNC: 3.2 GM/DL
GLUCOSE SERPL-MCNC: 80 MG/DL (ref 65–99)
GLUCOSE UR QL: NEGATIVE
HCT VFR BLD AUTO: 41.7 % (ref 35.6–45.5)
HDLC SERPL-MCNC: 62 MG/DL (ref 40–60)
HGB BLD-MCNC: 13.1 G/DL (ref 11.9–15.5)
HGB UR QL STRIP: NEGATIVE
IMM GRANULOCYTES # BLD: 0 10*3/MM3 (ref 0–0.03)
IMM GRANULOCYTES NFR BLD: 0 % (ref 0–0.5)
KETONES UR QL STRIP: NEGATIVE
LDLC SERPL CALC-MCNC: 95 MG/DL (ref 0–100)
LEUKOCYTE ESTERASE UR QL STRIP: NEGATIVE
LYMPHOCYTES # BLD AUTO: 2.05 10*3/MM3 (ref 0.9–4.8)
LYMPHOCYTES NFR BLD AUTO: 35.8 % (ref 19.6–45.3)
MCH RBC QN AUTO: 30 PG (ref 26.9–32)
MCHC RBC AUTO-ENTMCNC: 31.4 G/DL (ref 32.4–36.3)
MCV RBC AUTO: 95.4 FL (ref 80.5–98.2)
MICRO URNS: NORMAL
MICRO URNS: NORMAL
MONOCYTES # BLD AUTO: 0.52 10*3/MM3 (ref 0.2–1.2)
MONOCYTES NFR BLD AUTO: 9.1 % (ref 5–12)
MUCOUS THREADS URNS QL MICRO: PRESENT /HPF
NEUTROPHILS # BLD AUTO: 3.1 10*3/MM3 (ref 1.9–8.1)
NEUTROPHILS NFR BLD AUTO: 54 % (ref 42.7–76)
NITRITE UR QL STRIP: NEGATIVE
PH UR STRIP: 6.5 [PH] (ref 5–7.5)
PLATELET # BLD AUTO: 263 10*3/MM3 (ref 140–500)
POTASSIUM SERPL-SCNC: 4.4 MMOL/L (ref 3.5–5.2)
PROT SERPL-MCNC: 8.2 G/DL (ref 6–8.5)
PROT UR QL STRIP: NEGATIVE
RBC # BLD AUTO: 4.37 10*6/MM3 (ref 3.9–5.2)
RBC #/AREA URNS HPF: NORMAL /HPF
SODIUM SERPL-SCNC: 141 MMOL/L (ref 136–145)
SP GR UR: 1.02 (ref 1–1.03)
TRIGL SERPL-MCNC: 106 MG/DL (ref 0–150)
TSH SERPL DL<=0.005 MIU/L-ACNC: 2.85 MIU/ML (ref 0.27–4.2)
URINALYSIS REFLEX: NORMAL
UROBILINOGEN UR STRIP-MCNC: 0.2 MG/DL (ref 0.2–1)
VLDLC SERPL CALC-MCNC: 21.2 MG/DL (ref 5–40)
WBC # BLD AUTO: 5.73 10*3/MM3 (ref 4.5–10.7)
WBC #/AREA URNS HPF: NORMAL /HPF

## 2018-01-31 ENCOUNTER — ANESTHESIA (OUTPATIENT)
Dept: GASTROENTEROLOGY | Facility: HOSPITAL | Age: 33
End: 2018-01-31

## 2018-01-31 ENCOUNTER — ANESTHESIA EVENT (OUTPATIENT)
Dept: GASTROENTEROLOGY | Facility: HOSPITAL | Age: 33
End: 2018-01-31

## 2018-01-31 ENCOUNTER — HOSPITAL ENCOUNTER (OUTPATIENT)
Facility: HOSPITAL | Age: 33
Setting detail: HOSPITAL OUTPATIENT SURGERY
Discharge: HOME | End: 2018-01-31
Attending: INTERNAL MEDICINE | Admitting: INTERNAL MEDICINE

## 2018-01-31 VITALS
SYSTOLIC BLOOD PRESSURE: 111 MMHG | HEIGHT: 65 IN | TEMPERATURE: 97.9 F | RESPIRATION RATE: 14 BRPM | OXYGEN SATURATION: 100 % | HEART RATE: 72 BPM | BODY MASS INDEX: 23.06 KG/M2 | WEIGHT: 138.38 LBS | DIASTOLIC BLOOD PRESSURE: 74 MMHG

## 2018-01-31 DIAGNOSIS — R10.31 RLQ ABDOMINAL PAIN: ICD-10-CM

## 2018-01-31 PROCEDURE — 25010000002 PROPOFOL 10 MG/ML EMULSION: Performed by: ANESTHESIOLOGY

## 2018-01-31 PROCEDURE — 45385 COLONOSCOPY W/LESION REMOVAL: CPT | Performed by: INTERNAL MEDICINE

## 2018-01-31 PROCEDURE — 88305 TISSUE EXAM BY PATHOLOGIST: CPT | Performed by: INTERNAL MEDICINE

## 2018-01-31 RX ORDER — PROPOFOL 10 MG/ML
VIAL (ML) INTRAVENOUS AS NEEDED
Status: DISCONTINUED | OUTPATIENT
Start: 2018-01-31 | End: 2018-01-31 | Stop reason: SURG

## 2018-01-31 RX ORDER — LIDOCAINE HYDROCHLORIDE 20 MG/ML
INJECTION, SOLUTION INFILTRATION; PERINEURAL AS NEEDED
Status: DISCONTINUED | OUTPATIENT
Start: 2018-01-31 | End: 2018-01-31 | Stop reason: SURG

## 2018-01-31 RX ORDER — SODIUM CHLORIDE, SODIUM LACTATE, POTASSIUM CHLORIDE, CALCIUM CHLORIDE 600; 310; 30; 20 MG/100ML; MG/100ML; MG/100ML; MG/100ML
30 INJECTION, SOLUTION INTRAVENOUS CONTINUOUS PRN
Status: DISCONTINUED | OUTPATIENT
Start: 2018-01-31 | End: 2018-01-31 | Stop reason: HOSPADM

## 2018-01-31 RX ORDER — PROPOFOL 10 MG/ML
VIAL (ML) INTRAVENOUS CONTINUOUS PRN
Status: DISCONTINUED | OUTPATIENT
Start: 2018-01-31 | End: 2018-01-31 | Stop reason: SURG

## 2018-01-31 RX ADMIN — LIDOCAINE HYDROCHLORIDE 60 MG: 20 INJECTION, SOLUTION INFILTRATION; PERINEURAL at 10:15

## 2018-01-31 RX ADMIN — SODIUM CHLORIDE, POTASSIUM CHLORIDE, SODIUM LACTATE AND CALCIUM CHLORIDE 30 ML/HR: 600; 310; 30; 20 INJECTION, SOLUTION INTRAVENOUS at 09:59

## 2018-01-31 RX ADMIN — PROPOFOL 100 MCG/KG/MIN: 10 INJECTION, EMULSION INTRAVENOUS at 10:15

## 2018-01-31 RX ADMIN — SODIUM CHLORIDE, POTASSIUM CHLORIDE, SODIUM LACTATE AND CALCIUM CHLORIDE: 600; 310; 30; 20 INJECTION, SOLUTION INTRAVENOUS at 10:15

## 2018-01-31 RX ADMIN — PROPOFOL 100 MG: 10 INJECTION, EMULSION INTRAVENOUS at 10:15

## 2018-01-31 NOTE — ANESTHESIA PREPROCEDURE EVALUATION
Anesthesia Evaluation     Patient summary reviewed and Nursing notes reviewed          Airway   Mallampati: I  TM distance: <3 FB  Neck ROM: full  no difficulty expected  Dental - normal exam     Pulmonary - negative pulmonary ROS and normal exam   Cardiovascular - negative cardio ROS and normal exam        Neuro/Psych  (+) headaches, psychiatric history,     GI/Hepatic/Renal/Endo - negative ROS     Musculoskeletal (-) negative ROS    Abdominal  - normal exam    Bowel sounds: normal.   Substance History - negative use     OB/GYN negative ob/gyn ROS         Other                                                Anesthesia Plan    ASA 2     MAC     Anesthetic plan and risks discussed with patient.

## 2018-02-01 LAB
LAB AP CASE REPORT: NORMAL
Lab: NORMAL
PATH REPORT.FINAL DX SPEC: NORMAL
PATH REPORT.GROSS SPEC: NORMAL

## 2018-02-05 ENCOUNTER — TELEPHONE (OUTPATIENT)
Dept: GASTROENTEROLOGY | Facility: CLINIC | Age: 33
End: 2018-02-05

## 2018-02-05 NOTE — TELEPHONE ENCOUNTER
Called pt and advised per Dr Vasquez that the polyp showed hyperplastic change, this is not cancerous and is not precancerous. She recommends a repeat c/s in 5 yrs. Pt verb understanding.     C/s placed in recall for 01/31/2023.

## 2018-02-09 ENCOUNTER — OFFICE VISIT (OUTPATIENT)
Dept: INTERNAL MEDICINE | Facility: CLINIC | Age: 33
End: 2018-02-09

## 2018-02-09 VITALS
SYSTOLIC BLOOD PRESSURE: 118 MMHG | HEIGHT: 65 IN | HEART RATE: 107 BPM | WEIGHT: 138 LBS | OXYGEN SATURATION: 100 % | BODY MASS INDEX: 22.99 KG/M2 | DIASTOLIC BLOOD PRESSURE: 70 MMHG | RESPIRATION RATE: 16 BRPM | TEMPERATURE: 98.6 F

## 2018-02-09 DIAGNOSIS — K58.1 IRRITABLE BOWEL SYNDROME WITH CONSTIPATION: ICD-10-CM

## 2018-02-09 DIAGNOSIS — F51.05 INSOMNIA DUE TO MENTAL CONDITION: ICD-10-CM

## 2018-02-09 DIAGNOSIS — G43.109 MIGRAINE WITH AURA AND WITHOUT STATUS MIGRAINOSUS, NOT INTRACTABLE: ICD-10-CM

## 2018-02-09 DIAGNOSIS — E55.9 AVITAMINOSIS D: ICD-10-CM

## 2018-02-09 DIAGNOSIS — L70.0 ACNE VULGARIS: ICD-10-CM

## 2018-02-09 DIAGNOSIS — Z00.00 HEALTHCARE MAINTENANCE: Primary | ICD-10-CM

## 2018-02-09 DIAGNOSIS — F41.0 PANIC ATTACK: ICD-10-CM

## 2018-02-09 DIAGNOSIS — F41.1 GAD (GENERALIZED ANXIETY DISORDER): ICD-10-CM

## 2018-02-09 PROCEDURE — 99395 PREV VISIT EST AGE 18-39: CPT | Performed by: INTERNAL MEDICINE

## 2018-02-09 PROCEDURE — 99212 OFFICE O/P EST SF 10 MIN: CPT | Performed by: INTERNAL MEDICINE

## 2018-02-09 RX ORDER — TAZAROTENE 0.5 MG/G
GEL TOPICAL NIGHTLY
Qty: 30 G | Refills: 2 | Status: SHIPPED | OUTPATIENT
Start: 2018-02-09 | End: 2018-04-01

## 2018-02-09 RX ORDER — DICLOFENAC POTASSIUM 50 MG/1
POWDER, FOR SOLUTION ORAL
COMMUNITY
End: 2018-05-18 | Stop reason: SDUPTHER

## 2018-02-09 NOTE — PROGRESS NOTES
"Annual Exam (review of medical issues )      HPI  Carito Ritter is a 32 y.o. female RTC in yearly CPE, review of medical issues:  \"i am good, but I am kind of crazy as well\".   1. Anxiety - recurrence of anxiety in 2016 with new job, with prior diagnosis n past (with primary sx of SOA with (-) CT scan chest and PFT's and resolution with anxiety tx); recurrence with panic attacks - has been on venlafaxine and was doing well. Saw neuro and was given Clonopin for nighttime use to \"help me sleep and my restless less issue\".  However, notes in last few weeks anxiety has been \"rough\".  Not sure why anxiety is worse.  Is better at times when on Clonopin at night, but sometimes does not feel like it is doing anything.  At times will have flares where cannot go to sleep and will get into panic, even after clonazepam.  Is not seeing anyone in talk therapy. Did see therapist in past, someone through [facility].  Feels like sleep is biggest issue and can perpetuate anxiety issues.   2. Migraine HA -  S/p Neuro eval with Dr. Beauchamp at the end of 2017.  Had migraine when was prepping for C-scope (not eating is #1 trigger).  Used biofreeze on forehead for HA at time and has had rash on forehead since.   Cambia PRN with good results, just did not use prior to C-scope  As was NPO.     3. Vitamin D deficiency - trend levels with next labs.   4. 6 months hx of cyclical (following menstruation cycle) 2 week course of RLQ pain abdominal/ pelvic pain that preceded onset of recent restrictive \"hcg\" diet - saw GI and started Linzess that worked \"good\".  Had about \"90%\" reduction in pain.  Had C-scope to \"be sure\" and was negative except for hyperplastic polyp.  Feels like doing well with Linzess and that seems to be controlled. Bowels are regular and has at least one BM daily.       Review of Systems   Constitution: Positive for malaise/fatigue. Negative for chills, fever, weight gain and weight loss.   HENT: Negative for congestion, ear " "discharge, hearing loss, odynophagia and sore throat.    Eyes: Negative for discharge, double vision, pain and redness.        Last optho appt 1/17/18, wears glasses     Cardiovascular: Positive for chest pain (only with anxiety, resolves once mood settles down. ). Negative for dyspnea on exertion, irregular heartbeat, leg swelling, near-syncope, palpitations and syncope.   Respiratory: Negative for cough and shortness of breath.    Hematologic/Lymphatic: Negative for bleeding problem. Does not bruise/bleed easily.   Skin: Positive for rash (on forehead for last one week after used biofreeze on forehead.  Acne type rash. Is slowly getting better, but still getting more lesions. Had some whiteheads, now just red bumps only). Negative for suspicious lesions.   Musculoskeletal: Negative for joint pain, joint swelling, muscle cramps and muscle weakness.   Gastrointestinal: Positive for abdominal pain (\"90% better\" ). Negative for constipation, diarrhea, dysphagia, heartburn, nausea and vomiting.   Genitourinary: Negative for bladder incontinence, dysuria, frequency and hematuria.        Sees Gyn 2/2018   Neurological: Positive for headaches (migraines; at this time has 2/ month at this time. #1 trigger is not eating. ). Negative for dizziness and light-headedness.   Psychiatric/Behavioral: Negative for depression (\"I dont know\", feels more focused on anxiety). The patient has insomnia and is nervous/anxious.        Problem List:    Patient Active Problem List   Diagnosis   • DAPHNE (generalized anxiety disorder)   • Avitaminosis D   • Panic attack   • Migraine without aura and responsive to treatment   • Dermatofibroma of lower leg   • Migraine with aura and without status migrainosus, not intractable   • Irritable bowel syndrome with constipation       Medical History:    Past Medical History:   Diagnosis Date   • Generalized anxiety disorder    • Hx of hordeolum     recurrent in L eye   • Migraine headache    • Vitamin D " deficiency         Social History:    Social History     Social History   • Marital status:      Spouse name: N/A   • Number of children: 2   • Years of education: N/A     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     Social History Main Topics   • Smoking status: Never Smoker   • Smokeless tobacco: Never Used   • Alcohol use Yes      Comment: <1 wine week   • Drug use: No   • Sexual activity: Yes     Partners: Male      Comment:  only; no hx STD's     Other Topics Concern   • Not on file     Social History Narrative    Diet - overall healthy; eats fruits and veggies    Exercise - None    Caffeine - coffee in AM, 10oz daily       Family History:   Family History   Problem Relation Age of Onset   • Asthma Mother    • Hyperthyroidism Father    • Nephrolithiasis Father    • Diabetes Maternal Grandmother    • Lung cancer Maternal Grandfather    • Breast cancer Paternal Grandmother    • Diabetes Paternal Grandmother    • Hyperthyroidism Paternal Grandmother    • ADD / ADHD Son    • Malig Hyperthermia Neg Hx        Surgical History:   Past Surgical History:   Procedure Laterality Date   • BREAST AUGMENTATION  2007   • BREAST SURGERY      cyst aspiration Right breast 2012   • COLONOSCOPY N/A 1/31/2018    Procedure: COLONOSCOPY to cecum and TI with hot snare polypectomy;  Surgeon: Bere Vasquez MD;  Location: [facility] ENDOSCOPY;  Service:    • EXTERNAL EAR SURGERY      ear pining 2013 cosmetic   • EYE SURGERY      lasik   • TONSILLECTOMY AND ADENOIDECTOMY      2005         Current Outpatient Prescriptions:   •  clonazePAM (KlonoPIN) 0.5 MG tablet, Take 0.5 mg by mouth At Night As Needed., Disp: , Rfl:   •  Diclofenac Potassium (CAMBIA) 50 MG pack, Take  by mouth., Disp: , Rfl:   •  Diclofenac Potassium 50 MG pack, Take 1 capsule by mouth Daily As Needed (migraine)., Disp: 1 each, Rfl: 5  •  linaclotide (LINZESS) 72 MCG capsule capsule, Take 1 capsule by mouth Every Morning Before Breakfast., Disp: 30  capsule, Rfl: 5  •  LORazepam (ATIVAN) 0.5 MG tablet, Take 1 tablet by mouth Every 8 (Eight) Hours As Needed for anxiety., Disp: 30 tablet, Rfl: 0  •  venlafaxine XR (EFFEXOR-XR) 150 MG 24 hr capsule, Take one capsule daily (Patient taking differently: Take 150 mg by mouth Daily. Take one capsule daily), Disp: 30 capsule, Rfl: 3  •  tazarotene (TAZORAC) 0.05 % gel, Apply  topically Every Night., Disp: 30 g, Rfl: 2    Vitals:    02/09/18 1127   BP: 118/70   Pulse: 107   Resp: 16   Temp: 98.6 °F (37 °C)   SpO2: 100%       Physical Exam   Constitutional: She is oriented to person, place, and time. She appears well-developed and well-nourished.   HENT:   Head: Normocephalic and atraumatic.   Right Ear: Hearing, tympanic membrane, external ear and ear canal normal.   Left Ear: Hearing, tympanic membrane, external ear and ear canal normal.   Nose: Nose normal.   Mouth/Throat: Uvula is midline, oropharynx is clear and moist and mucous membranes are normal.   Eyes: Conjunctivae, EOM and lids are normal. Pupils are equal, round, and reactive to light.   Neck: Trachea normal and full passive range of motion without pain. Carotid bruit is not present. No thyroid mass and no thyromegaly present.   Cardiovascular: Normal rate, regular rhythm, S1 normal, S2 normal, normal heart sounds and intact distal pulses.  Exam reveals no gallop and no friction rub.    No murmur heard.  Pulses:       Radial pulses are 2+ on the right side, and 2+ on the left side.        Dorsalis pedis pulses are 2+ on the right side, and 2+ on the left side.        Posterior tibial pulses are 2+ on the right side, and 2+ on the left side.   Pulmonary/Chest: Effort normal and breath sounds normal. No respiratory distress. She has no wheezes. She has no rhonchi. She has no rales.   Abdominal: Soft. Normal appearance and bowel sounds are normal. She exhibits no distension and no mass. There is no hepatosplenomegaly. There is no tenderness. There is no rebound  and no guarding.   Musculoskeletal: Normal range of motion. She exhibits no edema.       Vascular Status -  Her exam exhibits right foot vasculature abnormal and no right foot edema. Her exam exhibits left foot vasculature abnormal and no left foot edema.  Lymphadenopathy:     She has no cervical adenopathy.     She has no axillary adenopathy.        Right: No inguinal adenopathy present.        Left: No inguinal adenopathy present.   Neurological: She is alert and oriented to person, place, and time. She has normal strength and normal reflexes. She displays no tremor. No cranial nerve deficit or sensory deficit. She exhibits normal muscle tone. Gait normal.   Skin: Skin is warm and dry. No rash noted.        Psychiatric: She has a normal mood and affect. Her behavior is normal. Cognition and memory are normal.   Vitals reviewed.      Assessment/ Plan  Diagnoses and all orders for this visit:    Healthcare maintenance    Avitaminosis D    DAPHNE (generalized anxiety disorder)    Migraine with aura and without status migrainosus, not intractable    Panic attack    Irritable bowel syndrome with constipation    Insomnia due to mental condition    Acne vulgaris  -     tazarotene (TAZORAC) 0.05 % gel; Apply  topically Every Night.    Other orders  -     Diclofenac Potassium (CAMBIA) 50 MG pack; Take  by mouth.      Return in about 8 weeks (around 4/6/2018).      Discussion:  Carito Ritter is a 32 y.o. female RTC in yearly CPE, review of medical issues:    1. Anxiety, recurrence of anxiety in 2016 with new job, with prior diagnosis in past (with primary sx of SOA with (-) CT scan chest and PFT's and resolution with anxiety tx),  recurrence with panic attacks in 2017 - was doing well on venlafaxine, but sx have recurred recently, largely revolving around insomnia issues. Pt is tracking sleep on isai and is then laying in bed getting anxous about not sleeping. Is not exercising. We discussed pt on good med regimen but  simply must start exercise routine and see talk therapist.  I gave name of sleep psycyhology and pt to start there. Also gave talk therapy list for general therapy.  Pt agreeable to plan.   2. Migraine WHITFIELD - sees Dr. Geronimo in neurology, on  abortive strategy with [facility] ISAACS.   Recalls intolerance to daily Ppx meds in past.  3. Vitamin D deficiency - borderline levels on labs, monitor with sun exposure in warmer weather.   4. RLQ pain, nearly resolved with recent start of Linzess for IBS per GI - C-scope negative and pelvic U/S and CT A/P negative in 2017.  Pt doing well on Linzess and will continue. F/ U GI as planned.   5. Acne, forehead - acute issue today,  after use biofreeze agent on forehead for migraine.  Has all closed comedones at this time, so will trial nightly tazortene cream.    6. HM - labs d/w pt; Flu - at Rx; Tdap UTD 2015; Pap/ Breast Exam UTD 12/2016 with Gyn, has 2018 appt; add exercise.    RTC 2-3 months f/u #1 and #5

## 2018-03-08 RX ORDER — VENLAFAXINE HYDROCHLORIDE 150 MG/1
CAPSULE, EXTENDED RELEASE ORAL
Qty: 30 CAPSULE | Refills: 2 | Status: SHIPPED | OUTPATIENT
Start: 2018-03-08 | End: 2018-06-07 | Stop reason: SDUPTHER

## 2018-04-01 ENCOUNTER — OFFICE VISIT (OUTPATIENT)
Dept: RETAIL CLINIC | Facility: CLINIC | Age: 33
End: 2018-04-01

## 2018-04-01 VITALS
HEART RATE: 97 BPM | OXYGEN SATURATION: 98 % | TEMPERATURE: 99.4 F | SYSTOLIC BLOOD PRESSURE: 102 MMHG | RESPIRATION RATE: 16 BRPM | DIASTOLIC BLOOD PRESSURE: 72 MMHG

## 2018-04-01 DIAGNOSIS — J10.1 INFLUENZA A: ICD-10-CM

## 2018-04-01 DIAGNOSIS — J02.9 PHARYNGITIS, UNSPECIFIED ETIOLOGY: Primary | ICD-10-CM

## 2018-04-01 LAB
EXPIRATION DATE: ABNORMAL
EXPIRATION DATE: NORMAL
FLUAV AG NPH QL: POSITIVE
FLUBV AG NPH QL: NEGATIVE
INTERNAL CONTROL: ABNORMAL
INTERNAL CONTROL: NORMAL
Lab: ABNORMAL
Lab: NORMAL
S PYO AG THROAT QL: NEGATIVE

## 2018-04-01 PROCEDURE — 87880 STREP A ASSAY W/OPTIC: CPT | Performed by: NURSE PRACTITIONER

## 2018-04-01 PROCEDURE — 99213 OFFICE O/P EST LOW 20 MIN: CPT | Performed by: NURSE PRACTITIONER

## 2018-04-01 PROCEDURE — 87804 INFLUENZA ASSAY W/OPTIC: CPT | Performed by: NURSE PRACTITIONER

## 2018-04-01 RX ORDER — OSELTAMIVIR PHOSPHATE 75 MG/1
75 CAPSULE ORAL 2 TIMES DAILY
Qty: 10 CAPSULE | Refills: 0 | Status: SHIPPED | OUTPATIENT
Start: 2018-04-01 | End: 2018-04-06

## 2018-04-01 RX ORDER — DEXTROMETHORPHAN HYDROBROMIDE AND PROMETHAZINE HYDROCHLORIDE 15; 6.25 MG/5ML; MG/5ML
SYRUP ORAL
Qty: 200 ML | Refills: 0 | Status: SHIPPED | OUTPATIENT
Start: 2018-04-01 | End: 2018-05-18

## 2018-04-01 NOTE — PROGRESS NOTES
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 The remainder of the patient's systems have been reviewed and are negative.     Past Medical History:   Diagnosis Date   • Generalized anxiety disorder    • Hx of hordeolum     recurrent in L eye   • Migraine headache    • Vitamin D deficiency        Past Surgical History:   Procedure Laterality Date   • BREAST AUGMENTATION  2007   • BREAST SURGERY      cyst aspiration Right breast 2012   • COLONOSCOPY N/A 1/31/2018    Procedure: COLONOSCOPY to cecum and TI with hot snare polypectomy;  Surgeon: Bere Vasquez MD;  Location: [facility] ENDOSCOPY;  Service:    • EXTERNAL EAR SURGERY      ear pining 2013 cosmetic   • EYE SURGERY      lasik   • TONSILLECTOMY AND ADENOIDECTOMY      2005       Social History   Substance Use Topics   • Smoking status: Never Smoker   • Smokeless tobacco: Never Used   • Alcohol use Defer      Comment: <1 wine week     Current Outpatient Prescriptions:   •  clonazePAM (KlonoPIN) 0.5 MG tablet, Take 0.5 mg by mouth At Night As Needed., Disp: , Rfl:   •  Diclofenac Potassium (CAMBIA) 50 MG pack, Take  by mouth., Disp: , Rfl:   •  Diclofenac Potassium 50 MG pack, Take 1 capsule by mouth Daily As Needed (migraine)., Disp: 1 each, Rfl: 5  •  linaclotide (LINZESS) 72 MCG capsule capsule, Take 1 capsule by mouth Every Morning Before Breakfast., Disp: 30 capsule, Rfl: 5  •  venlafaxine XR (EFFEXOR-XR) 150 MG 24 hr capsule, TAKE  ONE CAPSULE BY MOUTH DAILY, Disp: 30 capsule, Rfl: 2    No Known Allergies    OBJECTIVE:    /72   Pulse 97   Temp 99.4 °F (37.4 °C) (Oral)   Resp 16   LMP 03/28/2018   SpO2 98%     Lab Results (last 24 hours)     Procedure Component Value Units Date/Time    POC Influenza A / B [[id number]]  (Abnormal) Collected:  04/01/18 1631    Specimen:  Swab Updated:  04/01/18 1631     Rapid Influenza A Ag positive     Rapid Influenza B Ag negative     Internal Control Passed     Lot Number [id number]     Expiration Date 12/13/2020    POC Rapid Strep A [[id number]]  (Normal) Collected:  04/01/18 1630    Specimen:  Swab Updated:  04/01/18 1630     Rapid Strep A Screen Negative     Internal Control Passed     Lot Number [id number]     Expiration Date 12/31/2019    Beta Strep Culture, Throat - Swab, Throat [[id number]]  (Normal) Collected:  04/01/18    Specimen:  Swab from Throat Updated:  04/01/18 1623          General Appearance:    Alert, cooperative, no distress, appears stated age   Head:    Normocephalic, without obvious abnormality, atraumatic   Eyes:    PERRL, conjunctiva/corneas clear, EOM's intact, fundi     benign, both eyes   Ears:    Normal TM's and external ear canals, both ears   Nose:   Nares normal, septum midline, mucosa normal, no drainage     or sinus tenderness   Throat:   Lips, mucosa, and tongue normal; teeth and gums normal  Tonsils absent, posterior pharynx erythematous.   Neck:   Supple, symmetrical, trachea midline, +neck tenderness without anterior cervical lymphadenopathy;            Lungs:     Clear to auscultation bilaterally, respirations unlabored   Chest Wall:    No tenderness or deformity    Heart:    Regular rate and rhythm, S1 and S2 normal, no murmur, rub    or gallop                                       ASSESSMENT/PLAN    1. Pharyngitis, unspecified etiology      2. Influenza A    - oseltamivir (TAMIFLU) 75 MG capsule; Take 1 capsule by mouth 2 (Two) Times a Day for 5 days.  Dispense:  10 capsule; Refill: 0  - promethazine-dextromethorphan (PROMETHAZINE-DM) 6.25-15 MG/5ML syrup; Take 5-10 ml every 6 hours as needed for cough Do not mix with benzonatate. Do not drink alcohol or drive while taking this medication.  Dispense: 200 mL; Refill: 0      Ms. Ritter had no medications administered during this visit.

## 2018-04-04 ENCOUNTER — TELEPHONE (OUTPATIENT)
Dept: RETAIL CLINIC | Facility: CLINIC | Age: 33
End: 2018-04-04

## 2018-04-04 LAB — S PYO THROAT QL CULT: NEGATIVE

## 2018-05-18 ENCOUNTER — OFFICE VISIT (OUTPATIENT)
Dept: INTERNAL MEDICINE | Facility: CLINIC | Age: 33
End: 2018-05-18

## 2018-05-18 VITALS
OXYGEN SATURATION: 98 % | WEIGHT: 147 LBS | SYSTOLIC BLOOD PRESSURE: 122 MMHG | TEMPERATURE: 99 F | DIASTOLIC BLOOD PRESSURE: 72 MMHG | HEART RATE: 99 BPM | BODY MASS INDEX: 24.49 KG/M2 | HEIGHT: 65 IN

## 2018-05-18 DIAGNOSIS — K58.1 IRRITABLE BOWEL SYNDROME WITH CONSTIPATION: ICD-10-CM

## 2018-05-18 DIAGNOSIS — G43.109 MIGRAINE WITH AURA AND WITHOUT STATUS MIGRAINOSUS, NOT INTRACTABLE: ICD-10-CM

## 2018-05-18 DIAGNOSIS — F41.1 GAD (GENERALIZED ANXIETY DISORDER): Primary | ICD-10-CM

## 2018-05-18 DIAGNOSIS — G43.009 MIGRAINE WITHOUT AURA AND RESPONSIVE TO TREATMENT: ICD-10-CM

## 2018-05-18 DIAGNOSIS — F41.0 PANIC ATTACK: ICD-10-CM

## 2018-05-18 PROBLEM — G25.81 RLS (RESTLESS LEGS SYNDROME): Status: ACTIVE | Noted: 2018-03-27

## 2018-05-18 PROCEDURE — 99214 OFFICE O/P EST MOD 30 MIN: CPT | Performed by: INTERNAL MEDICINE

## 2018-05-18 RX ORDER — BUSPIRONE HYDROCHLORIDE 5 MG/1
TABLET ORAL
Qty: 60 TABLET | Refills: 3 | Status: SHIPPED | OUTPATIENT
Start: 2018-05-18 | End: 2018-06-15

## 2018-05-18 NOTE — PROGRESS NOTES
"Abdominal Pain (Right Lower Quad)      HPI  Carito Ritter is a 33 y.o. female  RTC In f/u:  1. Anxiety, recurrence of anxiety in 2016 with new job, with prior diagnosis in past (with primary sx of SOA with (-) CT scan chest and PFT's and resolution with anxiety tx),  recurrence with panic attacks in 2017 - was doing well on venlafaxine, but sx had recurred recently with major issue of insomnia. Notes that clonopin helped getting to sleep, but feels like is \"tired all the time\".  Is up very early and only getting about 6 hours of sleep.  Is using Clonopin nightly.  Had some nights, however, having anxiety close to panic attack still despite use of clonopin.    Recalls when was on hcg diet \"felt great\" and had no migraines on that diet.  Wonders if that diet is more reasonable diet for her.  Has gained some weight back now. Is trying to get back down.  \"I feel like it is all the same thing: anxiety, lack of sleep, stress\".   Notes diet was no dairy, no pork, no startches, no zucchini, and no sugar.   2. Migraine WHITFIELD - sees Dr. Geronimo in neurology, on  abortive strategy with Malu ISAACS.   Recalls intolerance to daily Ppx meds in past but notes had almost no HA while on hcg diet in past.  Diet was very restrictive however.   3. RLQ pain, resolved with recent start of Linzess for IBS per GI - notes \"it is kind of back\".  Feels like  Was doing OK but the got back into pattern of abdominal pain in RLQ and sensation of unable to go despite needing to go.  This AM had inability for 3 days to go, but then had urgency and had to pull over at gas station to have BM.  Notes has FHx of issue with son.  Missed no Linzess doses.  Frustrated as had been doing so well.   Diet has not changed.  Stress is about the same. Is not using any additional meds OTC for bowel.  No blood in stool.  This AM was a formed stool with some watery component at end of BM.      4. Acne, forehead - acute issue last visit.  Used tazortene creme and " "had burning and resolved acne quickly.   Has not had issues issues since, not restarted cream, and no recurrence.     Review of Systems   Constitution: Negative for chills and fever.   Cardiovascular: Negative for chest pain and dyspnea on exertion.   Respiratory: Negative for shortness of breath.    Skin: Negative for itching, rash and suspicious lesions.        Acne resolved     Gastrointestinal: Positive for abdominal pain and constipation. Negative for diarrhea, nausea and vomiting.   Psychiatric/Behavioral: Negative for depression. The patient has insomnia (can lead to panic attack if cannot get to sleep. ) and is nervous/anxious.        The following portions of the patient's history were reviewed and updated as appropriate: allergies, current medications, past medical history and problem list.      Current Outpatient Prescriptions:   •  clonazePAM (KlonoPIN) 0.5 MG tablet, Take 0.5 mg by mouth At Night As Needed., Disp: , Rfl:   •  Diclofenac Potassium 50 MG pack, Take 1 capsule by mouth Daily As Needed (migraine)., Disp: 1 each, Rfl: 5  •  linaclotide (LINZESS) 72 MCG capsule capsule, Take 1 capsule by mouth Every Morning Before Breakfast., Disp: 30 capsule, Rfl: 5  •  venlafaxine XR (EFFEXOR-XR) 150 MG 24 hr capsule, TAKE ONE CAPSULE BY MOUTH DAILY, Disp: 30 capsule, Rfl: 2  •  busPIRone (BUSPAR) 5 MG tablet, One PO BID x 1 week, then increase to 2 pills PO BID there after, Disp: 60 tablet, Rfl: 3    Vitals:    05/18/18 1443   BP: 122/72   Pulse: 99   Temp: 99 °F (37.2 °C)   SpO2: 98%   Weight: 66.7 kg (147 lb)   Height: 165.1 cm (65\")         Physical Exam   Constitutional: She is oriented to person, place, and time. She appears well-developed and well-nourished. No distress.   HENT:   Head: Normocephalic and atraumatic.   Mouth/Throat: Oropharynx is clear and moist. No oropharyngeal exudate.   Eyes: Pupils are equal, round, and reactive to light.   Neck: Normal range of motion. Neck supple. "   Cardiovascular: Normal rate, regular rhythm and normal heart sounds.    Pulmonary/Chest: Effort normal and breath sounds normal. No respiratory distress. She has no wheezes. She has no rales.   Abdominal: Soft. Bowel sounds are normal. She exhibits no distension and no mass. There is no tenderness. There is no guarding.   Musculoskeletal: She exhibits no edema.   Lymphadenopathy:     She has no cervical adenopathy.   Neurological: She is alert and oriented to person, place, and time. No cranial nerve deficit. She exhibits normal muscle tone. Gait normal.   Psychiatric: She has a normal mood and affect. Her behavior is normal.   Vitals reviewed.      Assessment/ Plan  Diagnoses and all orders for this visit:    DAPHNE (generalized anxiety disorder)  -     busPIRone (BUSPAR) 5 MG tablet; One PO BID x 1 week, then increase to 2 pills PO BID there after    Panic attack  -     busPIRone (BUSPAR) 5 MG tablet; One PO BID x 1 week, then increase to 2 pills PO BID there after    Migraine without aura and responsive to treatment    Migraine with aura and without status migrainosus, not intractable    Irritable bowel syndrome with constipation        Return in about 4 weeks (around 6/15/2018) for Recheck.      Discussion:  Carito Ritter is a 33 y.o. female  RTC In f/u:  1. Anxiety, recurrence of anxiety in 2016 with new job, with prior diagnosis in past (with primary sx of SOA with (-) CT scan chest and PFT's and resolution with anxiety tx),  recurrence with panic attacks in 2017 - was doing well on venlafaxine, but sx had recurred recently with major issue of insomnia. Clonopin is helping but in lengthy discussion with pt today it just seems that anxiety is pervasive in all aspects of life, notably with reflare of her IBS sx.  I think it is time to augment therapy as anxiety is starting to get in way of being able to work on other health issues. Will start Buspar 5mg BID x 1 week, [id number] BID thereafter.  Pt to RTC in  4 weeks for re-eval. OK for PRN Clonopin at night initially, but goal to back away from this as we get better anxiety control. Will readdress psychology again at f/u.  2. Migraine WHITFIELD - sees Dr. Geronimo in neurology, on  abortive strategy with Cambia PRN. Recalls that had NO HA when on restrictive hcg diet of no dairy, no pork, no startches, no zucchini, and no sugar.  I think we be able to find trigger if we restart diet and start adding things back one by one. However, do not want pt to start this until we have anxiety and new med under control.  Will readdress at f/u.   3. RLQ pain, resolved with start of Linzess for IBS per GI, now recurrent - pt has typical sx for her now recurrent with noted resurgence of anxiety.  I would like to work on anxiety and have pt c/w same dose of Linzess for now.  May consider adding fiber daily in future, but for now hold to avoid confounding.   Recall C-scope negative and pelvic U/S and CT A/P negative in 2017.    4. Acne, forehead - resolved with short course of tazortene creme. LUANNE today.

## 2018-06-07 RX ORDER — VENLAFAXINE HYDROCHLORIDE 150 MG/1
CAPSULE, EXTENDED RELEASE ORAL
Qty: 30 CAPSULE | Refills: 1 | Status: SHIPPED | OUTPATIENT
Start: 2018-06-07 | End: 2018-08-06 | Stop reason: SDUPTHER

## 2018-06-15 ENCOUNTER — OFFICE VISIT (OUTPATIENT)
Dept: INTERNAL MEDICINE | Facility: CLINIC | Age: 33
End: 2018-06-15

## 2018-06-15 VITALS
BODY MASS INDEX: 24.66 KG/M2 | OXYGEN SATURATION: 99 % | SYSTOLIC BLOOD PRESSURE: 116 MMHG | DIASTOLIC BLOOD PRESSURE: 70 MMHG | HEART RATE: 102 BPM | WEIGHT: 148 LBS | HEIGHT: 65 IN | TEMPERATURE: 99.3 F

## 2018-06-15 DIAGNOSIS — F41.1 GAD (GENERALIZED ANXIETY DISORDER): Primary | ICD-10-CM

## 2018-06-15 DIAGNOSIS — F41.0 PANIC ATTACK: ICD-10-CM

## 2018-06-15 DIAGNOSIS — K58.1 IRRITABLE BOWEL SYNDROME WITH CONSTIPATION: ICD-10-CM

## 2018-06-15 DIAGNOSIS — B35.4 TINEA CORPORIS: ICD-10-CM

## 2018-06-15 PROCEDURE — 99214 OFFICE O/P EST MOD 30 MIN: CPT | Performed by: INTERNAL MEDICINE

## 2018-06-15 RX ORDER — PRENATAL VIT 91/IRON/FOLIC/DHA 28-975-200
COMBINATION PACKAGE (EA) ORAL NIGHTLY
Start: 2018-06-15 | End: 2018-10-31

## 2018-06-15 RX ORDER — BUSPIRONE HYDROCHLORIDE 10 MG/1
10 TABLET ORAL 2 TIMES DAILY
Qty: 60 TABLET | Refills: 4 | Status: SHIPPED | OUTPATIENT
Start: 2018-06-15 | End: 2018-11-12 | Stop reason: SDUPTHER

## 2018-06-15 NOTE — PROGRESS NOTES
"Med Management (buspar)      HPI  Carito Ritter is a 33 y.o. female RTC In short interval f/u:    1. Anxiety, recurrence of anxiety in 2016 with new job, with prior diagnosis in past (with primary sx of SOA with (-) CT scan chest and PFT's and resolution with anxiety tx),  recurrence with panic attacks in 2017 - started buspar while on venlafaxine at last visit.  Has not had panic event or feeling of \"cannot control\" at all in last month. Getting to sleep better. Getting through night with sleep.  Has been even napping during day with 2 year old.  Is off school for summer.  Is doing some biking for exercise.  Still not sure she is OK with being on more meds, but admits it does help. Is thinking about doing talk therapy and prior counselor has left prior location.   2. Migraine WHITFIELD - sees Dr. Geronimo in neurology, on  abortive strategy with Cambia PRN.   3. RLQ pain, resolved with start of Linzess for IBS per GI, now recurrent - \"I dont know, I started going again\".  Pain resolved. Is pleased with progress.       Review of Systems   Constitution: Negative for malaise/fatigue.   Skin: Positive for rash (R forearm, had circular rash with red ring and central clearing. Better after 1 week of lamisil. \"I think I have ringworm\". ). Negative for color change (resolved).   Gastrointestinal: Negative for bloating, abdominal pain, constipation (resolved), diarrhea, nausea and vomiting.   Neurological: Negative for dizziness and light-headedness.   Psychiatric/Behavioral: Negative for depression. The patient is nervous/anxious (improved. ).        The following portions of the patient's history were reviewed and updated as appropriate: allergies, current medications, past medical history and problem list.      Current Outpatient Prescriptions:   •  clonazePAM (KlonoPIN) 0.5 MG tablet, Take 0.5 mg by mouth At Night As Needed., Disp: , Rfl:   •  Diclofenac Potassium 50 MG pack, Take 1 capsule by mouth Daily As Needed " "(migraine)., Disp: 1 each, Rfl: 5  •  linaclotide (LINZESS) 72 MCG capsule capsule, Take 1 capsule by mouth Every Morning Before Breakfast., Disp: 30 capsule, Rfl: 5  •  venlafaxine XR (EFFEXOR-XR) 150 MG 24 hr capsule, TAKE ONE CAPSULE BY MOUTH DAILY, Disp: 30 capsule, Rfl: 1  •  busPIRone (BUSPAR) 10 MG tablet, Take 1 tablet by mouth 2 (Two) Times a Day., Disp: 60 tablet, Rfl: 4  •  terbinafine (LAMISIL AT) 1 % cream, Apply  topically Every Night. X 2-4 weeks, Disp: , Rfl:     Vitals:    06/15/18 1344   BP: 116/70   Pulse: 102   Temp: 99.3 °F (37.4 °C)   SpO2: 99%   Weight: 67.1 kg (148 lb)   Height: 165.1 cm (65\")         Physical Exam   Constitutional: She is oriented to person, place, and time. She appears well-developed and well-nourished. No distress.   HENT:   Head: Normocephalic and atraumatic.   Eyes: Pupils are equal, round, and reactive to light.   Neck: Normal range of motion. Neck supple.   Cardiovascular: Normal rate, regular rhythm and normal heart sounds.    Pulmonary/Chest: Effort normal and breath sounds normal. No respiratory distress. She has no wheezes. She has no rales.   Abdominal: Soft. Bowel sounds are normal. She exhibits no distension and no mass. There is no tenderness. There is no rebound and no guarding.   Musculoskeletal: She exhibits no edema.   Lymphadenopathy:     She has no cervical adenopathy.   Neurological: She is alert and oriented to person, place, and time. No cranial nerve deficit. She exhibits normal muscle tone. Gait normal.   Skin:        Psychiatric: She has a normal mood and affect. Her behavior is normal.   Vitals reviewed.      Assessment/ Plan  Diagnoses and all orders for this visit:    DAPHNE (generalized anxiety disorder)    Panic attack    Irritable bowel syndrome with constipation    Tinea corporis  -     terbinafine (LAMISIL AT) 1 % cream; Apply  topically Every Night. X 2-4 weeks    Other orders  -     busPIRone (BUSPAR) 10 MG tablet; Take 1 tablet by mouth 2 " (Two) Times a Day.        Return in about 5 months (around 11/15/2018) for Recheck.      Discussion:  Carito Ritter is a 33 y.o. female RTC In short interval f/u:    1. Anxiety, recurrence of anxiety in 2016 with new job, with prior diagnosis in past (with primary sx of SOA with (-) CT scan chest and PFT's and resolution with anxiety tx),  recurrence with panic attacks in 2017 - doing well still on venlafaxine with addition of buspar low dose BID.  Pt has no further panic attacks and feels more incontrol of emotions. We discussed talk therapy during her summer months off of school and pt given psychology referral list today.  Sleeping well at this time.  WIll c/w current buspar dose and revisit issue at 5 month f/u appt. Add more exercise.   2. Migraine WHITFIELD - sees Dr. Geronimo in neurology, on  abortive strategy with Malu ISAACS. Has f/u appt upcoming and will discuss dietary triggers and past restrictive diet where HA had resolved.   3. RLQ pain, resolved with start of Linzess for IBS per GI, now recurrent (recall C-scope negative and pelvic U/S and CT A/P negative in 2017) - remains on stable dose of Linzess, but constipation issues resolved as got on buspar.  Exam benign  4. TInea corporis, R arm - new mention today, resolving on one week of OTC lamisil cream.  D/W pt need to 2-4 weeks for full resolution.      RTC 5 months.

## 2018-06-27 RX ORDER — LINACLOTIDE 72 UG/1
CAPSULE, GELATIN COATED ORAL
Qty: 90 CAPSULE | Refills: 1 | Status: SHIPPED | OUTPATIENT
Start: 2018-06-27 | End: 2018-12-23 | Stop reason: SDUPTHER

## 2018-06-30 ENCOUNTER — OFFICE VISIT (OUTPATIENT)
Dept: RETAIL CLINIC | Facility: CLINIC | Age: 33
End: 2018-06-30

## 2018-06-30 VITALS
DIASTOLIC BLOOD PRESSURE: 80 MMHG | TEMPERATURE: 99.8 F | SYSTOLIC BLOOD PRESSURE: 120 MMHG | HEART RATE: 86 BPM | RESPIRATION RATE: 18 BRPM | OXYGEN SATURATION: 98 %

## 2018-06-30 DIAGNOSIS — H66.002 ACUTE SUPPURATIVE OTITIS MEDIA OF LEFT EAR WITHOUT SPONTANEOUS RUPTURE OF TYMPANIC MEMBRANE, RECURRENCE NOT SPECIFIED: Primary | ICD-10-CM

## 2018-06-30 PROBLEM — H92.02 LEFT EAR PAIN: Status: ACTIVE | Noted: 2018-06-30

## 2018-06-30 PROCEDURE — 99213 OFFICE O/P EST LOW 20 MIN: CPT | Performed by: NURSE PRACTITIONER

## 2018-06-30 RX ORDER — AMOXICILLIN 875 MG/1
875 TABLET, COATED ORAL 2 TIMES DAILY
Qty: 20 TABLET | Refills: 0 | Status: SHIPPED | OUTPATIENT
Start: 2018-06-30 | End: 2018-07-19

## 2018-06-30 NOTE — PROGRESS NOTES
Subjective   Carito Ritter is a 33 y.o. female.     Earache    There is pain in the left ear. This is a new problem. The current episode started yesterday. The problem has been gradually worsening. The maximum temperature recorded prior to her arrival was 100.4 - 100.9 F. The fever has been present for 1 to 2 days. The pain is at a severity of 5/10. The pain is moderate. Associated symptoms include headaches. She has tried acetaminophen for the symptoms. The treatment provided mild relief. There is no history of a chronic ear infection, hearing loss or a tympanostomy tube.   Fever    This is a new problem. The current episode started yesterday. The problem occurs 2 to 4 times per day. The problem has been gradually worsening. Maximum temperature: 99.8. Associated symptoms include ear pain and headaches. She has tried acetaminophen for the symptoms. The treatment provided mild relief.   Risk factors: no contaminated food, no contaminated water and no recent sickness         The following portions of the patient's history were reviewed and updated as appropriate: allergies, current medications, past family history, past medical history, past surgical history and problem list.    Review of Systems   Constitutional: Positive for fever.   HENT: Positive for ear pain and postnasal drip.         Left ear pain   Eyes: Negative.    Respiratory: Negative.    Cardiovascular: Negative.    Gastrointestinal: Negative.        Objective   Physical Exam   Constitutional: She appears well-nourished.   HENT:   Left Ear: Tympanic membrane is injected, erythematous and bulging. Tympanic membrane mobility is abnormal. An impacted cerumen is not present.  Mouth/Throat: No oropharyngeal exudate, posterior oropharyngeal edema or posterior oropharyngeal erythema.   Cardiovascular: Normal rate, regular rhythm and normal heart sounds.    Pulmonary/Chest: No respiratory distress. She has no decreased breath sounds. She has no wheezes.  She has no rhonchi. She has no rales (_).   Abdominal: She exhibits no distension. There is no tenderness.   Nursing note reviewed.        Assessment/Plan   Carito was seen today for earache.    Diagnoses and all orders for this visit:    Acute suppurative otitis media of left ear without spontaneous rupture of tympanic membrane, recurrence not specified  -     amoxicillin (AMOXIL) 875 MG tablet; Take 1 tablet by mouth 2 (Two) Times a Day.      Talked to the patient about the diagnosis and educate the patient and advise to visit to PCP if the symptoms worsens

## 2018-06-30 NOTE — PATIENT INSTRUCTIONS

## 2018-07-19 ENCOUNTER — OFFICE VISIT (OUTPATIENT)
Dept: INTERNAL MEDICINE | Facility: CLINIC | Age: 33
End: 2018-07-19

## 2018-07-19 VITALS
BODY MASS INDEX: 23.66 KG/M2 | OXYGEN SATURATION: 100 % | WEIGHT: 142 LBS | DIASTOLIC BLOOD PRESSURE: 80 MMHG | HEIGHT: 65 IN | SYSTOLIC BLOOD PRESSURE: 116 MMHG | TEMPERATURE: 98.6 F | HEART RATE: 105 BPM

## 2018-07-19 DIAGNOSIS — R05.9 COUGH: ICD-10-CM

## 2018-07-19 DIAGNOSIS — J20.9 ACUTE BRONCHITIS, UNSPECIFIED ORGANISM: ICD-10-CM

## 2018-07-19 DIAGNOSIS — J01.90 ACUTE SINUSITIS, RECURRENCE NOT SPECIFIED, UNSPECIFIED LOCATION: Primary | ICD-10-CM

## 2018-07-19 PROCEDURE — 99214 OFFICE O/P EST MOD 30 MIN: CPT | Performed by: INTERNAL MEDICINE

## 2018-07-19 RX ORDER — CETIRIZINE HYDROCHLORIDE 10 MG/1
10 TABLET ORAL DAILY
Start: 2018-07-19 | End: 2018-10-31

## 2018-07-19 RX ORDER — IPRATROPIUM BROMIDE 21 UG/1
2 SPRAY, METERED NASAL 3 TIMES DAILY PRN
Qty: 30 ML | Refills: 1 | Status: SHIPPED | OUTPATIENT
Start: 2018-07-19 | End: 2018-10-31

## 2018-07-19 RX ORDER — IBUPROFEN 200 MG
TABLET ORAL
Qty: 30 TABLET | Refills: 0
Start: 2018-07-19 | End: 2020-03-09

## 2018-07-19 RX ORDER — SOD CHLOR,BICARB/SQUEEZ BOTTLE
1 PACKET, WITH RINSE DEVICE NASAL 3 TIMES DAILY
Qty: 1 EACH | Refills: 0 | Status: SHIPPED | OUTPATIENT
Start: 2018-07-19 | End: 2018-10-31

## 2018-07-19 NOTE — PROGRESS NOTES
"Cough (chest congestion) and Allergies      HPI  Carito Ritter is a 33 y.o. female RTC In acute care:  \"I have never had an sinus infection like this\".  2 weeks ago was in [place] and had severe pain in B ears and neck sx. Temp was \"right at 100\".  Went to [facility] in [place] and told was inner ear infection. Got Abx from [facility], Amoxicillin, and felt great thereafter.   Came home to [place] the next week (6 days ago) and couple days later started with runny nose and then progressed to throat pain.  Then deep in by ears had pressure underneath hears. Pain under ears and on neck again. \"Like I have clenched my jaw all night... Just like sore\".   Cough developed as well. No mucous noted with cough.  No SOA.  No fevers.  Tons of nasal mucous. Has multiple tissues all over next to bed.   \"Could all just be allergies\".    Meds: Claritin, Zyrtec, Sudaphed, Advil cold and sinus, StaHist, Tylenol PM.  None of it has really helped. Afrin on 2 days and will help for about one hour.  Tried Flonase but hates the smell and cannot tolerate. Last NSAID dose was Advil this AM.   had similar issue but was much more brief.     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     Review of Systems   Constitution: Negative for chills and fever.   HENT: Positive for congestion, ear pain and sore throat. Negative for hoarse voice.    Cardiovascular: Negative for dyspnea on exertion.   Respiratory: Positive for cough. Negative for shortness of breath, sputum production and wheezing.        The following portions of the patient's history were reviewed and updated as appropriate: allergies, current medications, past medical history and problem list.      Current Outpatient Prescriptions:   •  busPIRone (BUSPAR) 10 MG tablet, Take 1 tablet by mouth 2 (Two) Times a Day., Disp: 60 tablet, Rfl: 4  •  clonazePAM (KlonoPIN) 0.5 MG tablet, Take 0.5 mg by mouth At Night As Needed., Disp: , Rfl:   •  Diclofenac " "Potassium 50 MG pack, Take 1 capsule by mouth Daily As Needed (migraine)., Disp: 1 each, Rfl: 5  •  LINZESS 72 MCG capsule capsule, TAKE ONE CAPSULE BY MOUTH EVERY MORNING BEFORE BREAKFAST, Disp: 90 capsule, Rfl: 1  •  terbinafine (LAMISIL AT) 1 % cream, Apply  topically Every Night. X 2-4 weeks, Disp: , Rfl:   •  venlafaxine XR (EFFEXOR-XR) 150 MG 24 hr capsule, TAKE ONE CAPSULE BY MOUTH DAILY, Disp: 30 capsule, Rfl: 1  •  cetirizine (zyrTEC) 10 MG tablet, Take 1 tablet by mouth Daily., Disp: , Rfl:   •  fluticasone (VERAMYST) 27.5 MCG/SPRAY nasal spray, 2 sprays into each nostril Daily., Disp: 10 g, Rfl: 2  •  Hypertonic Nasal Wash (SINUS RINSE BOTTLE KIT) pack, 1 bottle into each nostril 3 (Three) Times a Day., Disp: 1 each, Rfl: 0  •  ibuprofen (MOTRIN IB) 200 MG tablet, 3 tabs PO q 8 hours, Disp: 30 tablet, Rfl: 0  •  ipratropium (ATROVENT) 0.03 % nasal spray, 2 sprays into each nostril 3 (Three) Times a Day As Needed for Rhinitis., Disp: 30 mL, Rfl: 1    Vitals:    07/19/18 1345   BP: 116/80   Pulse: 105   Temp: 98.6 °F (37 °C)   SpO2: 100%   Weight: 64.4 kg (142 lb)   Height: 165.1 cm (65\")         Physical Exam   Constitutional: She is oriented to person, place, and time. She appears well-developed and well-nourished. She does not have a sickly appearance. She does not appear ill. No distress.   HENT:   Head: Normocephalic and atraumatic.   Right Ear: Tympanic membrane, external ear and ear canal normal.   Left Ear: Tympanic membrane, external ear and ear canal normal.   Nose: Mucosal edema and rhinorrhea present. No nasal deformity. No epistaxis.   Mouth/Throat: Uvula is midline and oropharynx is clear and moist. Mucous membranes are not pale, not dry and not cyanotic. No oral lesions. No oropharyngeal exudate, posterior oropharyngeal edema or posterior oropharyngeal erythema.   Eyes: Pupils are equal, round, and reactive to light. Conjunctivae are normal. Right eye exhibits no discharge. Left eye exhibits " no discharge.   Neck: Normal range of motion. Neck supple. Muscular tenderness (over B cervical node regions) present. No neck rigidity. Normal range of motion present.   Cardiovascular: Normal rate, regular rhythm and normal heart sounds.    Pulmonary/Chest: Effort normal and breath sounds normal. No respiratory distress. She has no wheezes. She has no rales.   Lymphadenopathy:     She has no cervical adenopathy.   Neurological: She is alert and oriented to person, place, and time. No cranial nerve deficit.   Psychiatric: She has a normal mood and affect. Her behavior is normal.   Vitals reviewed.      Assessment/ Plan  Diagnoses and all orders for this visit:    Acute sinusitis, recurrence not specified, unspecified location  -     fluticasone (VERAMYST) 27.5 MCG/SPRAY nasal spray; 2 sprays into each nostril Daily.  -     ibuprofen (MOTRIN IB) 200 MG tablet; 3 tabs PO q 8 hours  -     Hypertonic Nasal Wash (SINUS RINSE BOTTLE KIT) pack; 1 bottle into each nostril 3 (Three) Times a Day.  -     cetirizine (zyrTEC) 10 MG tablet; Take 1 tablet by mouth Daily.  -     ipratropium (ATROVENT) 0.03 % nasal spray; 2 sprays into each nostril 3 (Three) Times a Day As Needed for Rhinitis.    Cough  -     fluticasone (VERAMYST) 27.5 MCG/SPRAY nasal spray; 2 sprays into each nostril Daily.  -     ibuprofen (MOTRIN IB) 200 MG tablet; 3 tabs PO q 8 hours  -     Hypertonic Nasal Wash (SINUS RINSE BOTTLE KIT) pack; 1 bottle into each nostril 3 (Three) Times a Day.  -     cetirizine (zyrTEC) 10 MG tablet; Take 1 tablet by mouth Daily.  -     ipratropium (ATROVENT) 0.03 % nasal spray; 2 sprays into each nostril 3 (Three) Times a Day As Needed for Rhinitis.    Acute bronchitis, unspecified organism  -     fluticasone (VERAMYST) 27.5 MCG/SPRAY nasal spray; 2 sprays into each nostril Daily.  -     ibuprofen (MOTRIN IB) 200 MG tablet; 3 tabs PO q 8 hours  -     Hypertonic Nasal Wash (SINUS RINSE BOTTLE KIT) pack; 1 bottle into each  "nostril 3 (Three) Times a Day.  -     cetirizine (zyrTEC) 10 MG tablet; Take 1 tablet by mouth Daily.  -     ipratropium (ATROVENT) 0.03 % nasal spray; 2 sprays into each nostril 3 (Three) Times a Day As Needed for Rhinitis.        Return for Next scheduled follow up.      Discussion:  Carito Ritter is a 33 y.o. female RTC In acute care (new issue to examiner) after recent ICC dx'd \"inner ear infection\" while out of town s/p amoxicllin course with resolved sx.  Sx recurred with additional sinus sx about 3-4 days ago after got back in town.  Exam today is notable for nasal mucosal edema and runny nose.  I suspect viral vs. Allergic etiology. No evidence of bacterial infection today and ears are clear on exam.  Rec'd tx as noted above with aggressive nasal toilet.  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 Pt to call or RTC if T> 100.5, SOA, double sickness, or failure to improve.      "

## 2018-08-06 RX ORDER — VENLAFAXINE HYDROCHLORIDE 150 MG/1
CAPSULE, EXTENDED RELEASE ORAL
Qty: 30 CAPSULE | Refills: 3 | Status: SHIPPED | OUTPATIENT
Start: 2018-08-06 | End: 2018-12-05 | Stop reason: SDUPTHER

## 2018-10-31 ENCOUNTER — OFFICE VISIT (OUTPATIENT)
Dept: INTERNAL MEDICINE | Facility: CLINIC | Age: 33
End: 2018-10-31

## 2018-10-31 VITALS
DIASTOLIC BLOOD PRESSURE: 68 MMHG | HEIGHT: 65 IN | BODY MASS INDEX: 25.66 KG/M2 | OXYGEN SATURATION: 99 % | WEIGHT: 154 LBS | SYSTOLIC BLOOD PRESSURE: 116 MMHG | TEMPERATURE: 98.3 F | HEART RATE: 94 BPM

## 2018-10-31 DIAGNOSIS — Z00.00 HEALTHCARE MAINTENANCE: ICD-10-CM

## 2018-10-31 DIAGNOSIS — G25.81 RLS (RESTLESS LEGS SYNDROME): ICD-10-CM

## 2018-10-31 DIAGNOSIS — F41.1 GAD (GENERALIZED ANXIETY DISORDER): Primary | ICD-10-CM

## 2018-10-31 DIAGNOSIS — F41.0 PANIC ATTACK: ICD-10-CM

## 2018-10-31 DIAGNOSIS — G43.109 MIGRAINE WITH AURA AND WITHOUT STATUS MIGRAINOSUS, NOT INTRACTABLE: ICD-10-CM

## 2018-10-31 PROBLEM — H92.02 LEFT EAR PAIN: Status: RESOLVED | Noted: 2018-06-30 | Resolved: 2018-10-31

## 2018-10-31 PROCEDURE — 90674 CCIIV4 VAC NO PRSV 0.5 ML IM: CPT | Performed by: INTERNAL MEDICINE

## 2018-10-31 PROCEDURE — 99214 OFFICE O/P EST MOD 30 MIN: CPT | Performed by: INTERNAL MEDICINE

## 2018-10-31 PROCEDURE — 90471 IMMUNIZATION ADMIN: CPT | Performed by: INTERNAL MEDICINE

## 2018-10-31 RX ORDER — MONTELUKAST SODIUM 10 MG/1
10 TABLET ORAL
COMMUNITY
End: 2020-03-09

## 2018-10-31 RX ORDER — DICLOFENAC POTASSIUM 50 MG/1
50 POWDER, FOR SOLUTION ORAL
COMMUNITY
Start: 2018-09-18 | End: 2020-03-09

## 2018-10-31 NOTE — PROGRESS NOTES
"Migraine and Med Refill      HPI  Carito Ritter is a 33 y.o. female RTC in f/u:  'Nothing is wrong with me\".  Needed to come in to talk about med refills and transitions of care at neuro.   1. \"Inner ear infection\" while out of town s/p amoxicllin course -sx resolved after last visit. NO issues.   2. Anxiety, recurrence of anxiety in 2016 with new job, with prior diagnosis in past (with primary sx of SOA with (-) CT scan chest and PFT's and resolution with anxiety tx) - has been doing well. NOtes \"Effexor refills are going to  prior to me seeing you again\". Needs to get refills. Will take Clonopin rarely for panic, my \"oh crap medication\".  Really will use maybe < 1 x/ month for this.    3. Migraine WHITFIELD - sees Dr. Geronimo in neurology, on abortive strategy with Cambia PRN. Dr. Geronimo is wanting to send pt to HA clinic at [place] and wants to have Rx completed here for Cambia and clonopin. Has been keeping log of migraines. Feels like frequency was surprising.  Had 12 HA since .  \"Qualifies for HA clinic now\". Using Cambia PRN, but is using Clonopin for Restless leg at night and sleep at night, only takes at night.   Wants to have clonopin and Cambia Rx transitioned to here at least until sees HA clinic and will no longer see DR. Geronimo (trying to simplify her M.D. Visit frequency and at the approval of DR. Geronimo).         Review of Systems   Musculoskeletal: Negative for muscle cramps.   Neurological: Positive for headaches (migraine, frequent). Negative for dizziness and light-headedness.   Psychiatric/Behavioral: Negative for altered mental status, depression and hallucinations. The patient is nervous/anxious (overall controlled). The patient does not have insomnia.        The following portions of the patient's history were reviewed and updated as appropriate: allergies, current medications, past medical history and problem list.      Current Outpatient Prescriptions:   •  busPIRone (BUSPAR) " "10 MG tablet, Take 1 tablet by mouth 2 (Two) Times a Day., Disp: 60 tablet, Rfl: 4  •  clonazePAM (KlonoPIN) 0.5 MG tablet, Take 0.5 mg by mouth At Night As Needed., Disp: , Rfl:   •  Diclofenac Potassium 50 MG pack, Take 50 mg by mouth., Disp: , Rfl:   •  fluticasone (VERAMYST) 27.5 MCG/SPRAY nasal spray, 2 sprays into each nostril Daily., Disp: 10 g, Rfl: 2  •  ibuprofen (MOTRIN IB) 200 MG tablet, 3 tabs PO q 8 hours, Disp: 30 tablet, Rfl: 0  •  LINZESS 72 MCG capsule capsule, TAKE ONE CAPSULE BY MOUTH EVERY MORNING BEFORE BREAKFAST, Disp: 90 capsule, Rfl: 1  •  montelukast (SINGULAIR) 10 MG tablet, Take 10 mg by mouth., Disp: , Rfl:   •  venlafaxine XR (EFFEXOR-XR) 150 MG 24 hr capsule, TAKE ONE CAPSULE BY MOUTH DAILY, Disp: 30 capsule, Rfl: 3    Vitals:    10/31/18 0945   BP: 116/68   Pulse: 94   Temp: 98.3 °F (36.8 °C)   SpO2: 99%   Weight: 69.9 kg (154 lb)   Height: 165.1 cm (65\")         Physical Exam   Constitutional: She is oriented to person, place, and time. She appears well-developed and well-nourished. No distress.   HENT:   Head: Normocephalic and atraumatic.   Eyes: Pupils are equal, round, and reactive to light.   Neck: Normal range of motion. Neck supple.   Cardiovascular: Normal rate, regular rhythm and normal heart sounds.    Pulmonary/Chest: Effort normal and breath sounds normal. No respiratory distress. She has no wheezes. She has no rales.   Musculoskeletal: She exhibits no edema.   Lymphadenopathy:     She has no cervical adenopathy.   Neurological: She is alert and oriented to person, place, and time. She displays no tremor. No cranial nerve deficit. She exhibits normal muscle tone. Gait normal.   Psychiatric: She has a normal mood and affect. Her behavior is normal.   Vitals reviewed.      Assessment/ Plan  Diagnoses and all orders for this visit:    DAPHNE (generalized anxiety disorder)    Migraine with aura and without status migrainosus, not intractable    Panic attack    RLS (restless legs " syndrome)    Healthcare maintenance  -     Cancel: Fluzone High Dose =>65Years  -     Flucelvax Quad=>4Years (PFS)    Other orders  -     montelukast (SINGULAIR) 10 MG tablet; Take 10 mg by mouth.  -     Diclofenac Potassium 50 MG pack; Take 50 mg by mouth.        Return in about 4 months (around 2/28/2019) for Annual physical.      Discussion:  Carito Ritter is a 33 y.o. female RTC in f/u:    1. Recent otitis media  - resolved after visit here 7/2018. LUANNE.  2. 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  Will c/w current meds, mood is good.   3. RLS - long term issue, managed per neuro on Clonopin nightly. Will transition this Rx over to here as pt will no longer see regular neuro and will only see HA clinic and [facility] reviewed/ consent completed today in office. Pt using appropriately once nightly with sx control. Will check ferritin next labs and see if this issue is confounding. Will reasess at next visit and discuss iron panel, light stretches at night, nightly benzo use.   4. Migraine HA - on Cambia PRN abortive strategy.  Planning to see HA clinic upcoming for new tx options.  Will await note.  Pt is doing good job of home tracking and will add dietary log to HA tracking.   5. HM - flu shot today    RTC 4 months CPE, F labs prior (include ferritin and iron stores/ B12 and drug screen)

## 2018-11-12 RX ORDER — BUSPIRONE HYDROCHLORIDE 10 MG/1
TABLET ORAL
Qty: 60 TABLET | Refills: 3 | Status: SHIPPED | OUTPATIENT
Start: 2018-11-12 | End: 2019-03-15 | Stop reason: SDUPTHER

## 2018-12-04 ENCOUNTER — TELEPHONE (OUTPATIENT)
Dept: INTERNAL MEDICINE | Facility: CLINIC | Age: 33
End: 2018-12-04

## 2018-12-04 NOTE — TELEPHONE ENCOUNTER
I can see her this week and get an assessment more quickly than getting her into derm.  Please make appt for Thursday.

## 2018-12-04 NOTE — TELEPHONE ENCOUNTER
Patient is experiencing red and itchy spots under both eyes The left eye started two weeks ago . Patient would like to know does she need to be seen by you or can you refer her to be seen by a demonologist   ? Please advise     MsJohnyStone [phone]

## 2018-12-05 RX ORDER — VENLAFAXINE HYDROCHLORIDE 150 MG/1
CAPSULE, EXTENDED RELEASE ORAL
Qty: 30 CAPSULE | Refills: 2 | Status: SHIPPED | OUTPATIENT
Start: 2018-12-05 | End: 2019-03-04 | Stop reason: SDUPTHER

## 2018-12-12 ENCOUNTER — OFFICE VISIT (OUTPATIENT)
Dept: INTERNAL MEDICINE | Facility: CLINIC | Age: 33
End: 2018-12-12

## 2018-12-12 VITALS
WEIGHT: 157 LBS | HEIGHT: 65 IN | SYSTOLIC BLOOD PRESSURE: 120 MMHG | BODY MASS INDEX: 26.16 KG/M2 | DIASTOLIC BLOOD PRESSURE: 82 MMHG | HEART RATE: 89 BPM | OXYGEN SATURATION: 97 %

## 2018-12-12 DIAGNOSIS — R21 RASH: Primary | ICD-10-CM

## 2018-12-12 PROCEDURE — 99213 OFFICE O/P EST LOW 20 MIN: CPT | Performed by: INTERNAL MEDICINE

## 2018-12-12 NOTE — PROGRESS NOTES
Subjective     Carito Ritter is a 33 y.o. female who presents with   Chief Complaint   Patient presents with   • Rash       History of Present Illness     Rash under her eyes.  Started 10 days ago.  Rash is bilateral.  Dry red raised patches.  Moisturizer little help.   Neosporin no help.      Review of Systems   Constitutional: Negative for fever.   Eyes: Negative for visual disturbance.       The following portions of the patient's history were reviewed and updated as appropriate: allergies, current medications and problem list.    Patient Active Problem List    Diagnosis Date Noted   • RLS (restless legs syndrome) 03/27/2018   • Irritable bowel syndrome with constipation 12/14/2017     Note Last Updated: 2/9/2018     C-scope (-) 2017; improved with Linzess including pt main sx of RLQ pain.     • Dermatofibroma of lower leg 01/30/2017   • Migraine with aura and without status migrainosus, not intractable 01/30/2017   • Migraine without aura and responsive to treatment 12/20/2016   • DAPHNE (generalized anxiety disorder) 12/06/2016   • Avitaminosis D 12/06/2016   • Panic attack 12/06/2016       Current Outpatient Medications on File Prior to Visit   Medication Sig Dispense Refill   • busPIRone (BUSPAR) 10 MG tablet TAKE ONE TABLET BY MOUTH TWICE A DAY 60 tablet 3   • clonazePAM (KlonoPIN) 0.5 MG tablet Take 0.5 mg by mouth At Night As Needed.     • Diclofenac Potassium 50 MG pack Take 50 mg by mouth.     • fluticasone (VERAMYST) 27.5 MCG/SPRAY nasal spray 2 sprays into each nostril Daily. 10 g 2   • ibuprofen (MOTRIN IB) 200 MG tablet 3 tabs PO q 8 hours 30 tablet 0   • LINZESS 72 MCG capsule capsule TAKE ONE CAPSULE BY MOUTH EVERY MORNING BEFORE BREAKFAST 90 capsule 1   • montelukast (SINGULAIR) 10 MG tablet Take 10 mg by mouth.     • venlafaxine XR (EFFEXOR-XR) 150 MG 24 hr capsule TAKE ONE CAPSULE BY MOUTH DAILY 30 capsule 2     No current facility-administered medications on file prior to visit.   "      Objective     /82   Pulse 89   Ht 165.1 cm (65\")   Wt 71.2 kg (157 lb)   SpO2 97%   BMI 26.13 kg/m²     Physical Exam   Constitutional: She is oriented to person, place, and time. She appears well-developed and well-nourished.   HENT:   Head: Normocephalic and atraumatic.   Pulmonary/Chest: Effort normal.   Neurological: She is alert and oriented to person, place, and time.   Skin:   Erythematous plaque under right eye approximately 2cm X 1cm.     Psychiatric: She has a normal mood and affect. Her behavior is normal.       Assessment/Plan   Carito was seen today for rash.    Diagnoses and all orders for this visit:    Rash        Discussion  Patient presents with a new rash under eyes possibly eczema or dermatitis.  She is instructed to stop neosporin.  She is going to try OTC hydrocortisone cream.  Let me know if not feeling better over the next week or if there is any change in symptoms.  I would refer her to a dermatologist if not improving.           Future Appointments   Date Time Provider Department Center   2/27/2019  9:50 AM [facility] None   3/4/2019 11:15 AM Kalr Snyder MD [facility] None         "

## 2018-12-28 RX ORDER — LINACLOTIDE 72 UG/1
CAPSULE, GELATIN COATED ORAL
Qty: 90 CAPSULE | Refills: 0 | Status: SHIPPED | OUTPATIENT
Start: 2018-12-28 | End: 2019-02-11

## 2019-02-11 ENCOUNTER — OFFICE VISIT (OUTPATIENT)
Dept: GASTROENTEROLOGY | Facility: CLINIC | Age: 34
End: 2019-02-11

## 2019-02-11 VITALS
HEIGHT: 65 IN | TEMPERATURE: 98.2 F | SYSTOLIC BLOOD PRESSURE: 100 MMHG | WEIGHT: 151.2 LBS | BODY MASS INDEX: 25.19 KG/M2 | DIASTOLIC BLOOD PRESSURE: 78 MMHG

## 2019-02-11 DIAGNOSIS — K59.00 CONSTIPATION, UNSPECIFIED CONSTIPATION TYPE: Primary | ICD-10-CM

## 2019-02-11 DIAGNOSIS — R10.31 RIGHT LOWER QUADRANT ABDOMINAL PAIN: ICD-10-CM

## 2019-02-11 PROCEDURE — 99214 OFFICE O/P EST MOD 30 MIN: CPT | Performed by: NURSE PRACTITIONER

## 2019-02-11 RX ORDER — TRETINOIN 0.8 MG/G
GEL TOPICAL
COMMUNITY
Start: 2019-01-29 | End: 2019-03-04

## 2019-02-11 NOTE — PROGRESS NOTES
Chief Complaint   Patient presents with   • Abdominal Pain       Carito Ritter is a  33 y.o. female here for a follow up visit for RLQ abd pain.     HPI  34 yo f presents today for follow up visit for constipation and chronic RLQ abd pain. She is a patient of Dr. Vasquez. She was last seen in the office on 12/2017. She has hx chronic constipation with RLQ abd pain. She has had extensive workup for this pain in the past. She admits after her last visit here and she started on the Linzess 72 daily she felt so good. Her bowels were moving well and the RLQ abd pain resolved. She admits this past October the Linzess seemed to quit working her constipation and the RLQ abd pain returned. She admits now the RLQ abd pain radiates to her RL back and into her right buttock. Heat helps. She denies any dysphagia, reflux, N&V, diarrhea, rectal bleeding or melena. She had her last colonoscopy on 1/31/18. It showed 1 HP polyp and NBIH. Recall in 5 years.   Past Medical History:   Diagnosis Date   • Anxiety    • Generalized anxiety disorder    • Hx of hordeolum     recurrent in L eye   • Migraine headache    • Vitamin D deficiency        Past Surgical History:   Procedure Laterality Date   • BREAST AUGMENTATION  2007   • BREAST SURGERY      cyst aspiration Right breast 2012   • COLONOSCOPY N/A 1/31/2018    One 7 mm polyp in the transverse colon, NBIH. Path: Hyperplastic polyp.    • EXTERNAL EAR SURGERY      ear pining 2013 cosmetic   • EYE SURGERY      lasik   • TONSILLECTOMY AND ADENOIDECTOMY      2005       Scheduled Meds:    Continuous Infusions:  No current facility-administered medications for this visit.     PRN Meds:.    No Known Allergies    Social History     Socioeconomic History   • Marital status:      Spouse name: Not on file   • Number of children: 2   • Years of education: Not on file   • Highest education level: Not on file   Social Needs   • Financial resource strain: Not on file   • Food insecurity -  worry: Not on file   • Food insecurity - inability: Not on file   • Transportation needs - medical: Not on file   • Transportation needs - non-medical: Not on file   Occupational History   • Occupation: Teacher     Comment: Male [employer]   Tobacco Use   • Smoking status: Never Smoker   • Smokeless tobacco: Never Used   Substance and Sexual Activity   • Alcohol use: Yes     Comment: <1 wine week   • Drug use: No   • Sexual activity: Yes     Partners: Male     Comment:  only; no hx STD's   Other Topics Concern   • Not on file   Social History Narrative    Diet - overall healthy; eats fruits and veggies    Exercise - None    Caffeine - coffee in AM, 10oz daily       Family History   Problem Relation Age of Onset   • Asthma Mother    • Hyperthyroidism Father    • Nephrolithiasis Father    • Diabetes Maternal Grandmother    • Lung cancer Maternal Grandfather    • Breast cancer Paternal Grandmother    • Diabetes Paternal Grandmother    • Hyperthyroidism Paternal Grandmother    • ADD / ADHD Son    • Malig Hyperthermia Neg Hx        Review of Systems   Constitutional: Negative for appetite change, chills, diaphoresis, fatigue, fever and unexpected weight change.   HENT: Negative for nosebleeds, postnasal drip, sore throat, trouble swallowing and voice change.    Respiratory: Negative for cough, choking, chest tightness, shortness of breath and wheezing.    Cardiovascular: Negative for chest pain.   Gastrointestinal: Positive for abdominal pain and constipation. Negative for abdominal distention, anal bleeding, blood in stool, diarrhea, nausea, rectal pain and vomiting.   Endocrine: Negative for polydipsia, polyphagia and polyuria.   Musculoskeletal: Negative for gait problem.   Skin: Negative for rash and wound.   Allergic/Immunologic: Negative for food allergies.   Neurological: Negative for dizziness, speech difficulty and light-headedness.   Psychiatric/Behavioral: Negative for confusion, self-injury, sleep  disturbance and suicidal ideas.       Vitals:    02/11/19 1258   BP: 100/78   Temp: 98.2 °F (36.8 °C)       Physical Exam   Constitutional: She is oriented to person, place, and time. She appears well-developed and well-nourished. She does not appear ill. No distress.   HENT:   Head: Normocephalic.   Eyes: Pupils are equal, round, and reactive to light.   Cardiovascular: Normal rate, regular rhythm and normal heart sounds.   Pulmonary/Chest: Effort normal and breath sounds normal.   Abdominal: Soft. Bowel sounds are normal. She exhibits no distension and no mass. There is no hepatosplenomegaly. There is tenderness. There is no rebound and no guarding. No hernia.       Musculoskeletal: Normal range of motion.   Neurological: She is alert and oriented to person, place, and time.   Skin: Skin is warm and dry.   Psychiatric: She has a normal mood and affect. Her speech is normal and behavior is normal. Judgment normal.       No images are attached to the encounter.    Assessment & Plan    1. Constipation, unspecified constipation type    2. Right lower quadrant abdominal pain    Will go ahead and increase the Linzess 72 to 145 daily (samples given) and see how she does. She can continue to use heat PRN. Call office in 1-2 weeks with update. Follow up with Dr. Vasquez in 3 months.

## 2019-02-14 ENCOUNTER — TELEPHONE (OUTPATIENT)
Dept: GASTROENTEROLOGY | Facility: CLINIC | Age: 34
End: 2019-02-14

## 2019-02-14 NOTE — TELEPHONE ENCOUNTER
Regarding: Visit Follow-Up Question  Contact: [phone]  ----- Message from [employer] sent at 2/13/2019 12:55 PM EST -----    So, I have taken the new dosage of Linzess for 2 mornings now and I still haven't had a bowel movement.  So it has been since a week ago Monday.  I have been drinking a ton of fluids as well- over 70 ounces yesterday.  Should I do anything differently, or continue to wait?  Thank you.

## 2019-02-14 NOTE — TELEPHONE ENCOUNTER
Call to pt.  Advise per M Luis A to add daily dose of miralax or stool softener to linzess to help.  Pt verb understanding.

## 2019-02-22 ENCOUNTER — TELEPHONE (OUTPATIENT)
Dept: GASTROENTEROLOGY | Facility: CLINIC | Age: 34
End: 2019-02-22

## 2019-02-22 NOTE — TELEPHONE ENCOUNTER
----- Message from Mark Gomez sent at 2/22/2019 10:24 AM EST -----  Regarding: New RX  Contact: [phone]  Please call to discuss.

## 2019-02-22 NOTE — TELEPHONE ENCOUNTER
Called pt and pt states she needs a script for Linzess 145mcg sent to her pharmacy.  She states Pita gave her this to try and she states it seems to be working.   Advised pt we will escribe this to her pharmacy. Pt verb understanding.   Update sent to Misite NP.

## 2019-02-25 DIAGNOSIS — Z00.00 HEALTHCARE MAINTENANCE: Primary | ICD-10-CM

## 2019-02-25 DIAGNOSIS — G25.81 RLS (RESTLESS LEGS SYNDROME): ICD-10-CM

## 2019-02-25 DIAGNOSIS — K58.1 IRRITABLE BOWEL SYNDROME WITH CONSTIPATION: ICD-10-CM

## 2019-02-25 DIAGNOSIS — E55.9 AVITAMINOSIS D: ICD-10-CM

## 2019-02-27 LAB
25(OH)D3+25(OH)D2 SERPL-MCNC: 40.9 NG/ML (ref 30–100)
ALBUMIN SERPL-MCNC: 4.9 G/DL (ref 3.5–5.2)
ALBUMIN/GLOB SERPL: 1.6 G/DL
ALP SERPL-CCNC: 51 U/L (ref 39–117)
ALT SERPL-CCNC: 11 U/L (ref 1–33)
APPEARANCE UR: CLEAR
AST SERPL-CCNC: 13 U/L (ref 1–32)
BACTERIA #/AREA URNS HPF: NORMAL /HPF
BASOPHILS # BLD AUTO: 0.02 10*3/MM3 (ref 0–0.2)
BASOPHILS NFR BLD AUTO: 0.5 % (ref 0–1.5)
BILIRUB SERPL-MCNC: 0.5 MG/DL (ref 0.1–1.2)
BILIRUB UR QL STRIP: NEGATIVE
BUN SERPL-MCNC: 14 MG/DL (ref 6–20)
BUN/CREAT SERPL: 19.7 (ref 7–25)
CALCIUM SERPL-MCNC: 10.3 MG/DL (ref 8.6–10.5)
CHLORIDE SERPL-SCNC: 102 MMOL/L (ref 98–107)
CHOLEST SERPL-MCNC: 159 MG/DL (ref 0–200)
CO2 SERPL-SCNC: 22.6 MMOL/L (ref 22–29)
COLOR UR: YELLOW
CREAT SERPL-MCNC: 0.71 MG/DL (ref 0.57–1)
EOSINOPHIL # BLD AUTO: 0.03 10*3/MM3 (ref 0–0.4)
EOSINOPHIL NFR BLD AUTO: 0.7 % (ref 0.3–6.2)
EPI CELLS #/AREA URNS HPF: NORMAL /HPF
ERYTHROCYTE [DISTWIDTH] IN BLOOD BY AUTOMATED COUNT: 13.3 % (ref 12.3–15.4)
GLOBULIN SER CALC-MCNC: 3 GM/DL
GLUCOSE SERPL-MCNC: 69 MG/DL (ref 65–99)
GLUCOSE UR QL: NEGATIVE
HCT VFR BLD AUTO: 44.2 % (ref 34–46.6)
HDLC SERPL-MCNC: 51 MG/DL (ref 40–60)
HGB BLD-MCNC: 14.2 G/DL (ref 12–15.9)
HGB UR QL STRIP: NEGATIVE
IMM GRANULOCYTES # BLD AUTO: 0.02 10*3/MM3 (ref 0–0.05)
IMM GRANULOCYTES NFR BLD AUTO: 0.5 % (ref 0–0.5)
KETONES UR QL STRIP: ABNORMAL
LDLC SERPL CALC-MCNC: 93 MG/DL (ref 0–100)
LEUKOCYTE ESTERASE UR QL STRIP: NEGATIVE
LYMPHOCYTES # BLD AUTO: 1.55 10*3/MM3 (ref 0.7–3.1)
LYMPHOCYTES NFR BLD AUTO: 38.7 % (ref 19.6–45.3)
MCH RBC QN AUTO: 29.4 PG (ref 26.6–33)
MCHC RBC AUTO-ENTMCNC: 32.1 G/DL (ref 31.5–35.7)
MCV RBC AUTO: 91.5 FL (ref 79–97)
MICRO URNS: ABNORMAL
MICRO URNS: ABNORMAL
MONOCYTES # BLD AUTO: 0.46 10*3/MM3 (ref 0.1–0.9)
MONOCYTES NFR BLD AUTO: 11.5 % (ref 5–12)
MUCOUS THREADS URNS QL MICRO: PRESENT /HPF
NEUTROPHILS # BLD AUTO: 1.93 10*3/MM3 (ref 1.4–7)
NEUTROPHILS NFR BLD AUTO: 48.1 % (ref 42.7–76)
NITRITE UR QL STRIP: NEGATIVE
NRBC BLD AUTO-RTO: 0 /100 WBC (ref 0–0)
PH UR STRIP: 6 [PH] (ref 5–7.5)
PLATELET # BLD AUTO: 244 10*3/MM3 (ref 140–450)
POTASSIUM SERPL-SCNC: 4.2 MMOL/L (ref 3.5–5.2)
PROT SERPL-MCNC: 7.9 G/DL (ref 6–8.5)
PROT UR QL STRIP: ABNORMAL
RBC # BLD AUTO: 4.83 10*6/MM3 (ref 3.77–5.28)
RBC #/AREA URNS HPF: NORMAL /HPF
SODIUM SERPL-SCNC: 139 MMOL/L (ref 136–145)
SP GR UR: >=1.03 (ref 1–1.03)
TRIGL SERPL-MCNC: 75 MG/DL (ref 0–150)
TSH SERPL DL<=0.005 MIU/L-ACNC: 2.57 MIU/ML (ref 0.27–4.2)
URINALYSIS REFLEX: ABNORMAL
UROBILINOGEN UR STRIP-MCNC: 1 MG/DL (ref 0.2–1)
VLDLC SERPL CALC-MCNC: 15 MG/DL (ref 5–40)
WBC # BLD AUTO: 4.01 10*3/MM3 (ref 3.4–10.8)
WBC #/AREA URNS HPF: NORMAL /HPF

## 2019-03-04 ENCOUNTER — OFFICE VISIT (OUTPATIENT)
Dept: INTERNAL MEDICINE | Facility: CLINIC | Age: 34
End: 2019-03-04

## 2019-03-04 VITALS
HEIGHT: 65 IN | TEMPERATURE: 99 F | SYSTOLIC BLOOD PRESSURE: 118 MMHG | DIASTOLIC BLOOD PRESSURE: 70 MMHG | BODY MASS INDEX: 24.16 KG/M2 | HEART RATE: 92 BPM | OXYGEN SATURATION: 98 % | WEIGHT: 145 LBS

## 2019-03-04 DIAGNOSIS — F41.0 PANIC ATTACK: ICD-10-CM

## 2019-03-04 DIAGNOSIS — L70.0 ACNE VULGARIS: ICD-10-CM

## 2019-03-04 DIAGNOSIS — F41.1 GAD (GENERALIZED ANXIETY DISORDER): ICD-10-CM

## 2019-03-04 DIAGNOSIS — G25.81 RLS (RESTLESS LEGS SYNDROME): ICD-10-CM

## 2019-03-04 DIAGNOSIS — K58.1 IRRITABLE BOWEL SYNDROME WITH CONSTIPATION: ICD-10-CM

## 2019-03-04 DIAGNOSIS — E55.9 AVITAMINOSIS D: ICD-10-CM

## 2019-03-04 DIAGNOSIS — Z00.00 HEALTHCARE MAINTENANCE: Primary | ICD-10-CM

## 2019-03-04 DIAGNOSIS — G43.109 MIGRAINE WITH AURA AND WITHOUT STATUS MIGRAINOSUS, NOT INTRACTABLE: ICD-10-CM

## 2019-03-04 DIAGNOSIS — Z91.09 ENVIRONMENTAL ALLERGIES: ICD-10-CM

## 2019-03-04 PROCEDURE — 90632 HEPA VACCINE ADULT IM: CPT | Performed by: INTERNAL MEDICINE

## 2019-03-04 PROCEDURE — 90471 IMMUNIZATION ADMIN: CPT | Performed by: INTERNAL MEDICINE

## 2019-03-04 PROCEDURE — 99395 PREV VISIT EST AGE 18-39: CPT | Performed by: INTERNAL MEDICINE

## 2019-03-04 RX ORDER — CLONAZEPAM 0.5 MG/1
0.5 TABLET ORAL 3 TIMES DAILY PRN
Qty: 90 TABLET | Refills: 0 | Status: SHIPPED | OUTPATIENT
Start: 2019-03-04 | End: 2019-07-22 | Stop reason: SDUPTHER

## 2019-03-04 RX ORDER — VENLAFAXINE HYDROCHLORIDE 150 MG/1
CAPSULE, EXTENDED RELEASE ORAL
Qty: 30 CAPSULE | Refills: 1 | Status: SHIPPED | OUTPATIENT
Start: 2019-03-04 | End: 2019-05-03 | Stop reason: SDUPTHER

## 2019-03-04 NOTE — PROGRESS NOTES
"Annual Exam (review of medical issues )      HPI  Carito Ritter is a 33 y.o. female RTC in yearly CPE, review of medical issues:   1. Anxiety/ DAPHNE, recurrence of anxiety in 2016 with new job, with prior diagnosis in past (with primary sx of SOA with (-) CT scan chest and PFT's and resolution with anxiety tx) - overall controlled on venlafaxine and buspar. Notes moods is \"pretty good\".  Never did see talk therapy.  Despite has started working out daily and \"my mood is good\".  Feels like daily workouts have been \"her time\" and been very helpful. Sleep is good.  Using Clonopin most nights for sleep. Uses med as PRN for panic, but only has used for that reason 4-5x. Has been on nightly clonopin for \"a while\", at least one year. Originally started by Dr. Geronimo in neuro. Not sure if does anything or not at night, but has just kept taking it.    2. Anxiety, recurrence of anxiety in 2016 with new job, with prior diagnosis in past (with primary sx of SOA with (-) CT scan chest and PFT's and resolution with anxiety tx),  recurrence with panic attacks in 2017 - doing well on venlafaxine and Buspar. Feels like sx are controlled.  Never did see talk thearpist. Is exercising daily and feels like this helps as well as gives her some time to herself.  Pleased with progress.    3. RLS - long term issue, managed per neuro on Clonopin nightly. Has sx once monthly at this time.  Feels like \"has not been as bad\" since exercise started \"since I have been working out\".  Exercise is mostly at night.   4. Migraine HA - has moved from Dr. Geronimo to [facility].  Got Botox injx 11/2018 and noted reduction by 1/2 in HA frequency.  Had second tx a few days ago. Pleased with outcome on Botox.  Has backed away from very strict restrictive diet like in past, but is still doing more keto format diet.    5. RLQ pain, resolved with start of Linzess for IBS per GI, recurred in early 2019 - had intense pain in RLQ return. Saw GI and had Linzess " "doubled. Had 14 days without BM and it \"was horrible'.  Is c/w Linzess daily now and notes is having BM QOD now. Pain has resolved as bowels are now moving. Recalls had C-scope in 2018.   6. Acne, forehead - had resolved with short course of tazortene creme. Saw derm recently and had yearly exam. Given Retin-A cream and uses < nightly. Overall controlled at this time.   7. Environmental Allergies - sees allergy.  Is on shots weekly.  Taking Singulair daily.  \"I think that has helped too\".     Review of Systems   Constitution: Positive for weight loss (intentional). Negative for chills, fever and malaise/fatigue.   HENT: Negative for congestion, odynophagia and sore throat.    Eyes: Negative for discharge, double vision, pain and redness.        Last eye exam ~2/2018; wears glasses at times     Cardiovascular: Negative for chest pain, dyspnea on exertion, irregular heartbeat, leg swelling, near-syncope, palpitations and syncope.   Respiratory: Negative for cough and shortness of breath.    Endocrine: Negative for polydipsia, polyphagia and polyuria.   Hematologic/Lymphatic: Negative for bleeding problem. Does not bruise/bleed easily.   Skin: Negative for rash and suspicious lesions.   Musculoskeletal: Negative for joint pain, joint swelling, muscle cramps, muscle weakness and myalgias.   Gastrointestinal: Positive for constipation (managed). Negative for diarrhea, dysphagia, heartburn, hematochezia, melena, nausea and vomiting.   Genitourinary: Negative for dysuria, frequency, hematuria and hesitancy.        Last Pap ~2/2016; never had mammo     Neurological: Negative for dizziness, headaches and light-headedness.   Psychiatric/Behavioral: Negative for depression. The patient does not have insomnia and is not nervous/anxious (controlled).    Allergic/Immunologic: Positive for environmental allergies (on shots weekly). Negative for persistent infections.       Problem List:    Patient Active Problem List   Diagnosis "   • DAPHNE (generalized anxiety disorder)   • Avitaminosis D   • Panic attack   • Dermatofibroma of lower leg   • Migraine with aura and without status migrainosus, not intractable   • Irritable bowel syndrome with constipation   • RLS (restless legs syndrome)   • Acne vulgaris   • Environmental allergies       Medical History:    Past Medical History:   Diagnosis Date   • Acne vulgaris 3/4/2019    Sees derm annually    • Anxiety    • Environmental allergies 3/4/2019   • Generalized anxiety disorder    • Hx of hordeolum     recurrent in [facility]   • Migraine headache    • Vitamin D deficiency         Social History:    Social History     Socioeconomic History   • Marital status:      Spouse name: Not on file   • Number of children: 2   • Years of education: Not on file   • Highest education level: Not on file   Social Needs   • Financial resource strain: Not on file   • Food insecurity - worry: Not on file   • Food insecurity - inability: Not on file   • Transportation needs - medical: Not on file   • Transportation needs - non-medical: Not on file   Occupational History   • Occupation: Teacher     Comment: Male [employer]   Tobacco Use   • Smoking status: Never Smoker   • Smokeless tobacco: Never Used   Substance and Sexual Activity   • Alcohol use: Yes     Comment: <1 wine week   • Drug use: No   • Sexual activity: Yes     Partners: Male     Comment:  only; no hx STD's   Other Topics Concern   • Not on file   Social History Narrative    Diet - overall healthy; eats fruits and veggies    Exercise - None; 2019 7 days/ week exercise in evening    Caffeine - coffee in AM, 10oz daily       Family History:   Family History   Problem Relation Age of Onset   • Asthma Mother    • Hyperthyroidism Father    • Nephrolithiasis Father    • Diabetes Maternal Grandmother    • Lung cancer Maternal Grandfather    • Breast cancer Paternal Grandmother    • Diabetes Paternal Grandmother    • Hyperthyroidism Paternal Grandmother    •  ADD / ADHD Son    • Malig Hyperthermia Neg Hx        Surgical History:   Past Surgical History:   Procedure Laterality Date   • BREAST AUGMENTATION  2007   • BREAST SURGERY      cyst aspiration Right breast 2012   • COLONOSCOPY N/A 1/31/2018    One 7 mm polyp in the transverse colon, NBIH. Path: Hyperplastic polyp.    • EXTERNAL EAR SURGERY      ear pining 2013 cosmetic   • EYE SURGERY      lasik   • TONSILLECTOMY AND ADENOIDECTOMY      2005         Current Outpatient Medications:   •  busPIRone (BUSPAR) 10 MG tablet, TAKE ONE TABLET BY MOUTH TWICE A DAY, Disp: 60 tablet, Rfl: 3  •  clonazePAM (KlonoPIN) 0.5 MG tablet, Take 0.5 mg by mouth At Night As Needed., Disp: , Rfl:   •  linaclotide (LINZESS) 145 MCG capsule capsule, Take 1 capsule by mouth Every Morning Before Breakfast., Disp: 30 capsule, Rfl: 3  •  montelukast (SINGULAIR) 10 MG tablet, Take 10 mg by mouth., Disp: , Rfl:   •  Tretinoin Microsphere (RETIN-A MICRO PUMP) 0.08 % gel, , Disp: , Rfl:   •  venlafaxine XR (EFFEXOR-XR) 150 MG 24 hr capsule, TAKE ONE CAPSULE BY MOUTH DAILY, Disp: 30 capsule, Rfl: 1  •  Diclofenac Potassium 50 MG pack, Take 50 mg by mouth., Disp: , Rfl:   •  ibuprofen (MOTRIN IB) 200 MG tablet, 3 tabs PO q 8 hours (Patient taking differently: As Needed. 3 tabs PO q 8 hours), Disp: 30 tablet, Rfl: 0    Vitals:    03/04/19 1137   BP: 118/70   Pulse: 92   Temp: 99 °F (37.2 °C)   SpO2: 98%       Physical Exam   Constitutional: She is oriented to person, place, and time. She appears well-developed and well-nourished.   HENT:   Head: Normocephalic and atraumatic.   Right Ear: Hearing, tympanic membrane, external ear and ear canal normal.   Left Ear: Hearing, tympanic membrane, external ear and ear canal normal.   Nose: Nose normal.   Mouth/Throat: Uvula is midline, oropharynx is clear and moist and mucous membranes are normal.   Eyes: Conjunctivae, EOM and lids are normal. Pupils are equal, round, and reactive to light. Right eye exhibits  no discharge. Left eye exhibits no discharge. No scleral icterus.   Neck: Trachea normal, normal range of motion and full passive range of motion without pain. Neck supple. Carotid bruit is not present. No thyroid mass and no thyromegaly present.   Cardiovascular: Normal rate, regular rhythm, S1 normal, S2 normal, normal heart sounds and intact distal pulses. Exam reveals no gallop and no friction rub.   No murmur heard.  Pulses:       Radial pulses are 2+ on the right side, and 2+ on the left side.        Dorsalis pedis pulses are 2+ on the right side, and 2+ on the left side.        Posterior tibial pulses are 2+ on the right side, and 2+ on the left side.   Pulmonary/Chest: Effort normal and breath sounds normal. No respiratory distress. She has no wheezes. She has no rhonchi. She has no rales.   Abdominal: Soft. Normal appearance and bowel sounds are normal. She exhibits no distension and no mass. There is no hepatosplenomegaly. There is no tenderness. There is no rebound and no guarding.   Musculoskeletal: Normal range of motion. She exhibits no edema.     Vascular Status -  Her right foot exhibits normal foot vasculature  and no edema. Her left foot exhibits normal foot vasculature  and no edema.  Lymphadenopathy:     She has no cervical adenopathy.     She has no axillary adenopathy.        Right: No inguinal adenopathy present.        Left: No inguinal adenopathy present.   Neurological: She is alert and oriented to person, place, and time. She has normal strength and normal reflexes. She displays no tremor. No cranial nerve deficit or sensory deficit. She exhibits normal muscle tone. Gait normal.   Skin: Skin is warm and dry. No rash noted.   Psychiatric: She has a normal mood and affect. Her behavior is normal. Thought content normal. Cognition and memory are normal.   Vitals reviewed.      Assessment/ Plan  Diagnoses and all orders for this visit:    Healthcare maintenance  -     Hepatitis A Vaccine Adult  IM    Avitaminosis D    DAPHNE (generalized anxiety disorder)    Irritable bowel syndrome with constipation    Migraine with aura and without status migrainosus, not intractable    Panic attack    RLS (restless legs syndrome)  -     Ferritin  -     Iron Profile    Acne vulgaris    Environmental allergies    Other orders  -     Tretinoin Microsphere (RETIN-A MICRO PUMP) 0.08 % gel;         Return in about 6 months (around 9/4/2019) for Recheck.      Discussion:  Carito Ritter is a 33 y.o. female RTC in yearly CPE, review of medical issues:   1. Anxiety/ DAPHNE, recurrence of anxiety in 2016 with new job, with prior diagnosis in past (with primary sx of SOA with (-) CT scan chest and PFT's and resolution with anxiety tx) - overall controlled on venlafaxine and buspar. Uses Clonopin as PRN for panic, but only rare use. Will transition Rx for Clonopin to this office today as no longer sees Dr. Geronimo.  Consent completed. Broder[place] reviewed.   2. RLS - long term issue, was managed per neuro on Clonopin nightly. Given monthly occurrence of sx, do need to check iron stores as possible secondary etiology.  Will supplement with iron if needed. Goal ferritin > 100.  Otherwise, will trial wean off Clonopin on qhs basis by change to 1/2 pill nightly for 1-2 months, then trial off.      3. Migraine HA - no longer sees Dr. Geronimo, now managed per HA clinic at [facility].  Responding to Botox injx with reduction of HA volume by ~50%. Remains with Cambia PRN abortive strategy.       4. RLQ pain, resolved with start of Linzess for IBS per GI, recurred in early 2019 - s/p GI eval 2/2019 with increase in Linzess dosing. Sx have resolved with return of regular BM's.     Recall C-scope negative 1/2018 (1 hyperplastic polyp) and pelvic U/S and CT A/P negative in 2017.    5. Acne, forehead - controlled on Retin-A cream and uses < nightly, per derm.     6. Hx of Vitamin D deficiency - replete on labs. Monitor.   7. Environmental Allergies - on  immunotherapy weekly per allergy, along with Singulair daily.  C/W same.   8. HM - labs d/w pt; Flu/ Tdap UTD; Hep A # 1 today; Pap/ Breast Exam UTD 12/2016 with Gyn, has 2019 appt; c/w exercise.     RTC 6 months, F labs prior

## 2019-03-05 LAB
FERRITIN SERPL-MCNC: 73 NG/ML (ref 13–150)
IRON SATN MFR SERPL: 25 % (ref 20–50)
IRON SERPL-MCNC: 67 MCG/DL (ref 37–145)
TIBC SERPL-MCNC: 265 MCG/DL
UIBC SERPL-MCNC: 198 MCG/DL

## 2019-03-06 RX ORDER — FERROUS SULFATE 325(65) MG
325 TABLET ORAL
Qty: 30 TABLET | Refills: 2
Start: 2019-03-06 | End: 2019-06-04

## 2019-03-15 RX ORDER — BUSPIRONE HYDROCHLORIDE 10 MG/1
TABLET ORAL
Qty: 60 TABLET | Refills: 2 | Status: SHIPPED | OUTPATIENT
Start: 2019-03-15 | End: 2019-06-13 | Stop reason: SDUPTHER

## 2019-03-28 RX ORDER — LINACLOTIDE 72 UG/1
CAPSULE, GELATIN COATED ORAL
Qty: 90 CAPSULE | Refills: 0 | OUTPATIENT
Start: 2019-03-28

## 2019-05-03 RX ORDER — VENLAFAXINE HYDROCHLORIDE 150 MG/1
CAPSULE, EXTENDED RELEASE ORAL
Qty: 30 CAPSULE | Refills: 0 | Status: SHIPPED | OUTPATIENT
Start: 2019-05-03 | End: 2019-06-02 | Stop reason: SDUPTHER

## 2019-05-13 ENCOUNTER — OFFICE VISIT (OUTPATIENT)
Dept: GASTROENTEROLOGY | Facility: CLINIC | Age: 34
End: 2019-05-13

## 2019-05-13 VITALS
TEMPERATURE: 98.4 F | SYSTOLIC BLOOD PRESSURE: 106 MMHG | DIASTOLIC BLOOD PRESSURE: 70 MMHG | WEIGHT: 158 LBS | BODY MASS INDEX: 26.33 KG/M2 | HEIGHT: 65 IN

## 2019-05-13 DIAGNOSIS — K58.1 IRRITABLE BOWEL SYNDROME WITH CONSTIPATION: Primary | ICD-10-CM

## 2019-05-13 PROCEDURE — 99213 OFFICE O/P EST LOW 20 MIN: CPT | Performed by: INTERNAL MEDICINE

## 2019-05-13 NOTE — PROGRESS NOTES
Chief Complaint   Patient presents with   • Follow-up   • Abdominal Pain       Carito Ritter is a  34 y.o. female here for a follow up visit for RLQ abd pain.       32 yo f presents today for follow up visit for constipation and chronic RLQ abd pain.She was last seen in the office on 2/2019. She has hx chronic constipation with RLQ abd pain. She has had extensive workup for this pain in the past. She admits after her visit in 2017 she started on the Linzess 72 daily she felt so good. Her bowels were moving well and the RLQ abd pain resolved. She that starting in October 2018, the Linzess seemed to quit working her constipation and the RLQ abd pain returned.  She had her last colonoscopy on 1/31/18. It showed 1 HP polyp and NBIH. Recall in 5 years.      She was seen by Pat Gunn NP last visit- her linzess dose was increased to 145 mcg daily.  She reports that this is working well for her.  She is having daily BMs - stools are a little loose.  The right lower quadrant pain has resolved.  No blood in stool.  HPI  Past Medical History:   Diagnosis Date   • Acne vulgaris 3/4/2019    Sees derm annually    • Anxiety    • Environmental allergies 3/4/2019   • Generalized anxiety disorder    • Hx of hordeolum     recurrent in [facility]   • Migraine headache    • Vitamin D deficiency      Past Surgical History:   Procedure Laterality Date   • BREAST AUGMENTATION  2007   • BREAST SURGERY      cyst aspiration Right breast 2012   • COLONOSCOPY N/A 1/31/2018    One 7 mm polyp in the transverse colon, NBIH. Path: Hyperplastic polyp.    • EXTERNAL EAR SURGERY      ear pining 2013 cosmetic   • EYE SURGERY      lasik   • TONSILLECTOMY AND ADENOIDECTOMY      2005       Current Outpatient Medications:   •  busPIRone (BUSPAR) 10 MG tablet, TAKE ONE TABLET BY MOUTH TWICE A DAY, Disp: 60 tablet, Rfl: 2  •  clonazePAM (KlonoPIN) 0.5 MG tablet, Take 1 tablet by mouth 3 (Three) Times a Day As Needed for Anxiety. (Patient taking  differently: Take 0.25 mg by mouth Daily.), Disp: 90 tablet, Rfl: 0  •  Diclofenac Potassium 50 MG pack, Take 50 mg by mouth., Disp: , Rfl:   •  ferrous sulfate 325 (65 FE) MG tablet, Take 1 tablet by mouth Daily With Breakfast for 90 days., Disp: 30 tablet, Rfl: 2  •  ibuprofen (MOTRIN IB) 200 MG tablet, 3 tabs PO q 8 hours (Patient taking differently: As Needed. 3 tabs PO q 8 hours), Disp: 30 tablet, Rfl: 0  •  linaclotide (LINZESS) 145 MCG capsule capsule, Take 1 capsule by mouth Every Morning Before Breakfast., Disp: 30 capsule, Rfl: 3  •  montelukast (SINGULAIR) 10 MG tablet, Take 10 mg by mouth., Disp: , Rfl:   •  Tretinoin Microsphere (RETIN-A MICRO PUMP) 0.08 % gel, , Disp: , Rfl:   •  venlafaxine XR (EFFEXOR-XR) 150 MG 24 hr capsule, TAKE ONE CAPSULE BY MOUTH DAILY, Disp: 30 capsule, Rfl: 0  PRN Meds:.  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   Tobacco Use   • Smoking status: Never Smoker   • Smokeless tobacco: Never Used   Substance and Sexual Activity   • Alcohol use: Yes     Comment: <1 wine week   • Drug use: No   • Sexual activity: Yes     Partners: Male     Comment:  only; no hx STD's   Lifestyle   • Physical activity:     Days per week: 7 days     Minutes per session: 40 min   • Stress: Not on file   Social History Narrative    Diet - overall healthy; eats fruits and veggies    Exercise - None; 2019 7 days/ week exercise in evening    Caffeine - coffee in AM, 10oz daily     Family History   Problem Relation Age of Onset   • Asthma Mother    • Hyperthyroidism Father    • Nephrolithiasis Father    • Diabetes Maternal Grandmother    • Lung cancer Maternal Grandfather    • Breast cancer Paternal Grandmother    • Diabetes Paternal Grandmother    •  Hyperthyroidism Paternal Grandmother    • ADD / ADHD Son    • Malig Hyperthermia Neg Hx      Review of Systems   Constitutional: Negative for appetite change and unexpected weight change.   Gastrointestinal: Negative for abdominal pain, blood in stool and constipation.   Neurological: Positive for headaches.   All other systems reviewed and are negative.    Vitals:    05/13/19 0822   BP: 106/70   Temp: 98.4 °F (36.9 °C)         05/13/19 0822   Weight: 71.7 kg (158 lb)     Physical Exam   Constitutional: She appears well-developed and well-nourished.   HENT:   Head: Normocephalic and atraumatic.   Eyes: No scleral icterus.   Abdominal: Soft. She exhibits no distension and no mass. There is no tenderness.   Neurological: She is alert.   Skin: Skin is warm and dry.   Psychiatric: She has a normal mood and affect.     No images are attached to the encounter.  Diagnoses and all orders for this visit:    Irritable bowel syndrome with constipation    Plan:  - Much improved with dose increase to 145 mcg - continue same  - office f/u 1 year of sooner if needed

## 2019-06-03 RX ORDER — VENLAFAXINE HYDROCHLORIDE 150 MG/1
CAPSULE, EXTENDED RELEASE ORAL
Qty: 30 CAPSULE | Refills: 3 | Status: SHIPPED | OUTPATIENT
Start: 2019-06-03 | End: 2019-08-26 | Stop reason: SDUPTHER

## 2019-06-13 RX ORDER — BUSPIRONE HYDROCHLORIDE 10 MG/1
TABLET ORAL
Qty: 60 TABLET | Refills: 1 | Status: SHIPPED | OUTPATIENT
Start: 2019-06-13 | End: 2019-08-12 | Stop reason: SDUPTHER

## 2019-06-19 DIAGNOSIS — E61.1 IRON DEFICIENCY: Primary | ICD-10-CM

## 2019-06-19 LAB
BASOPHILS # BLD AUTO: 0.04 10*3/MM3 (ref 0–0.2)
BASOPHILS NFR BLD AUTO: 0.5 % (ref 0–1.5)
EOSINOPHIL # BLD AUTO: 0.07 10*3/MM3 (ref 0–0.4)
EOSINOPHIL NFR BLD AUTO: 0.9 % (ref 0.3–6.2)
ERYTHROCYTE [DISTWIDTH] IN BLOOD BY AUTOMATED COUNT: 13.2 % (ref 12.3–15.4)
FERRITIN SERPL-MCNC: 30.2 NG/ML (ref 13–150)
HCT VFR BLD AUTO: 41.5 % (ref 34–46.6)
HGB BLD-MCNC: 13.2 G/DL (ref 12–15.9)
IMM GRANULOCYTES # BLD AUTO: 0.09 10*3/MM3 (ref 0–0.05)
IMM GRANULOCYTES NFR BLD AUTO: 1.2 % (ref 0–0.5)
IRON SATN MFR SERPL: 19 % (ref 20–50)
IRON SERPL-MCNC: 81 MCG/DL (ref 37–145)
LYMPHOCYTES # BLD AUTO: 1.4 10*3/MM3 (ref 0.7–3.1)
LYMPHOCYTES NFR BLD AUTO: 18.2 % (ref 19.6–45.3)
MCH RBC QN AUTO: 29.7 PG (ref 26.6–33)
MCHC RBC AUTO-ENTMCNC: 31.8 G/DL (ref 31.5–35.7)
MCV RBC AUTO: 93.5 FL (ref 79–97)
MONOCYTES # BLD AUTO: 0.77 10*3/MM3 (ref 0.1–0.9)
MONOCYTES NFR BLD AUTO: 10 % (ref 5–12)
NEUTROPHILS # BLD AUTO: 5.31 10*3/MM3 (ref 1.7–7)
NEUTROPHILS NFR BLD AUTO: 69.2 % (ref 42.7–76)
NRBC BLD AUTO-RTO: 0 /100 WBC (ref 0–0.2)
PLATELET # BLD AUTO: 305 10*3/MM3 (ref 140–450)
RBC # BLD AUTO: 4.44 10*6/MM3 (ref 3.77–5.28)
TIBC SERPL-MCNC: 416 MCG/DL
UIBC SERPL-MCNC: 335 MCG/DL (ref 112–346)
WBC # BLD AUTO: 7.68 10*3/MM3 (ref 3.4–10.8)

## 2019-07-02 RX ORDER — LINACLOTIDE 145 UG/1
CAPSULE, GELATIN COATED ORAL
Qty: 30 CAPSULE | Refills: 6 | Status: SHIPPED | OUTPATIENT
Start: 2019-07-02 | End: 2020-02-04

## 2019-07-22 DIAGNOSIS — G25.81 RLS (RESTLESS LEGS SYNDROME): ICD-10-CM

## 2019-07-22 DIAGNOSIS — F41.1 GAD (GENERALIZED ANXIETY DISORDER): ICD-10-CM

## 2019-07-22 RX ORDER — CLONAZEPAM 0.5 MG/1
TABLET ORAL
Qty: 90 TABLET | Refills: 0 | Status: SHIPPED | OUTPATIENT
Start: 2019-07-22 | End: 2019-08-26 | Stop reason: SDUPTHER

## 2019-08-12 RX ORDER — BUSPIRONE HYDROCHLORIDE 10 MG/1
TABLET ORAL
Qty: 60 TABLET | Refills: 0 | Status: SHIPPED | OUTPATIENT
Start: 2019-08-12 | End: 2019-08-26 | Stop reason: SDUPTHER

## 2019-08-26 ENCOUNTER — TELEPHONE (OUTPATIENT)
Dept: INTERNAL MEDICINE | Facility: CLINIC | Age: 34
End: 2019-08-26

## 2019-08-26 DIAGNOSIS — F41.1 GAD (GENERALIZED ANXIETY DISORDER): ICD-10-CM

## 2019-08-26 DIAGNOSIS — G25.81 RLS (RESTLESS LEGS SYNDROME): ICD-10-CM

## 2019-08-26 RX ORDER — BUSPIRONE HYDROCHLORIDE 10 MG/1
10 TABLET ORAL 2 TIMES DAILY
Qty: 60 TABLET | Refills: 3 | Status: SHIPPED | OUTPATIENT
Start: 2019-08-26 | End: 2019-12-16 | Stop reason: SDUPTHER

## 2019-08-26 RX ORDER — VENLAFAXINE HYDROCHLORIDE 150 MG/1
150 CAPSULE, EXTENDED RELEASE ORAL DAILY
Qty: 30 CAPSULE | Refills: 3 | Status: SHIPPED | OUTPATIENT
Start: 2019-08-26 | End: 2019-08-29 | Stop reason: SDUPTHER

## 2019-08-26 RX ORDER — CLONAZEPAM 0.5 MG/1
0.5 TABLET ORAL 3 TIMES DAILY PRN
Qty: 90 TABLET | Refills: 0 | Status: SHIPPED | OUTPATIENT
Start: 2019-08-26 | End: 2019-10-08 | Stop reason: SDUPTHER

## 2019-08-29 RX ORDER — VENLAFAXINE HYDROCHLORIDE 150 MG/1
150 CAPSULE, EXTENDED RELEASE ORAL DAILY
Qty: 90 CAPSULE | Refills: 1 | Status: SHIPPED | OUTPATIENT
Start: 2019-08-29 | End: 2019-10-01 | Stop reason: SDUPTHER

## 2019-09-06 ENCOUNTER — LAB (OUTPATIENT)
Dept: INTERNAL MEDICINE | Facility: CLINIC | Age: 34
End: 2019-09-06

## 2019-09-06 ENCOUNTER — TELEPHONE (OUTPATIENT)
Dept: INTERNAL MEDICINE | Facility: CLINIC | Age: 34
End: 2019-09-06

## 2019-09-06 PROCEDURE — 90471 IMMUNIZATION ADMIN: CPT | Performed by: INTERNAL MEDICINE

## 2019-09-06 PROCEDURE — 90632 HEPA VACCINE ADULT IM: CPT | Performed by: INTERNAL MEDICINE

## 2019-09-06 NOTE — TELEPHONE ENCOUNTER
Patient stopped at check out today and said that the pharmacy needs clarification on the medicine effexor 150 mg Dr. dai is prescribing. Pharmacy # [phone]

## 2019-09-09 RX ORDER — BUSPIRONE HYDROCHLORIDE 10 MG/1
TABLET ORAL
Qty: 60 TABLET | Refills: 0 | Status: SHIPPED | OUTPATIENT
Start: 2019-09-09 | End: 2019-10-05 | Stop reason: SDUPTHER

## 2019-09-23 ENCOUNTER — TELEPHONE (OUTPATIENT)
Dept: INTERNAL MEDICINE | Facility: CLINIC | Age: 34
End: 2019-09-23

## 2019-09-23 NOTE — TELEPHONE ENCOUNTER
Pt is calling stating dr. galvan put her on effexor 75mg in addition to the effexor 150mg that she is currently taking for migraines. She is wanting to know If you can also give her the 75mg instead of her getting the same medication from two different drs.

## 2019-09-24 NOTE — TELEPHONE ENCOUNTER
Yes. I can prescribe both for her at this time.  225 mg daily is a stardard high dose of this med.

## 2019-10-01 RX ORDER — VENLAFAXINE HYDROCHLORIDE 150 MG/1
CAPSULE, EXTENDED RELEASE ORAL
Qty: 30 CAPSULE | Refills: 2 | Status: SHIPPED | OUTPATIENT
Start: 2019-10-01 | End: 2020-10-12

## 2019-10-07 RX ORDER — BUSPIRONE HYDROCHLORIDE 10 MG/1
TABLET ORAL
Qty: 60 TABLET | Refills: 0 | Status: SHIPPED | OUTPATIENT
Start: 2019-10-07 | End: 2020-03-16

## 2019-10-08 ENCOUNTER — TELEPHONE (OUTPATIENT)
Dept: INTERNAL MEDICINE | Facility: CLINIC | Age: 34
End: 2019-10-08

## 2019-10-08 DIAGNOSIS — G25.81 RLS (RESTLESS LEGS SYNDROME): ICD-10-CM

## 2019-10-08 DIAGNOSIS — F41.1 GAD (GENERALIZED ANXIETY DISORDER): ICD-10-CM

## 2019-10-08 RX ORDER — CLONAZEPAM 0.5 MG/1
0.5 TABLET ORAL NIGHTLY
Qty: 90 TABLET | Refills: 1 | Status: SHIPPED | OUTPATIENT
Start: 2019-10-08 | End: 2020-03-16

## 2019-10-08 NOTE — TELEPHONE ENCOUNTER
Pt is wanting to know if you can change the directions on the klonopin. She is taking one tablet at night instead of prn.

## 2019-12-16 RX ORDER — BUSPIRONE HYDROCHLORIDE 10 MG/1
10 TABLET ORAL 2 TIMES DAILY
Qty: 60 TABLET | Refills: 2 | Status: SHIPPED | OUTPATIENT
Start: 2019-12-16 | End: 2020-02-24 | Stop reason: SDUPTHER

## 2020-02-04 RX ORDER — LINACLOTIDE 145 UG/1
CAPSULE, GELATIN COATED ORAL
Qty: 30 CAPSULE | Refills: 3 | Status: SHIPPED | OUTPATIENT
Start: 2020-02-04 | End: 2020-10-28 | Stop reason: SDUPTHER

## 2020-02-24 ENCOUNTER — OFFICE VISIT (OUTPATIENT)
Dept: INTERNAL MEDICINE | Facility: CLINIC | Age: 35
End: 2020-02-24

## 2020-02-24 VITALS
HEIGHT: 65 IN | WEIGHT: 161 LBS | OXYGEN SATURATION: 98 % | HEART RATE: 86 BPM | BODY MASS INDEX: 26.82 KG/M2 | TEMPERATURE: 99 F | DIASTOLIC BLOOD PRESSURE: 80 MMHG | SYSTOLIC BLOOD PRESSURE: 122 MMHG

## 2020-02-24 DIAGNOSIS — R50.9 FEVER, UNSPECIFIED FEVER CAUSE: ICD-10-CM

## 2020-02-24 DIAGNOSIS — J20.9 ACUTE BRONCHITIS, UNSPECIFIED ORGANISM: ICD-10-CM

## 2020-02-24 DIAGNOSIS — R05.9 COUGH: ICD-10-CM

## 2020-02-24 DIAGNOSIS — J02.9 ACUTE PHARYNGITIS, UNSPECIFIED ETIOLOGY: Primary | ICD-10-CM

## 2020-02-24 LAB
EXPIRATION DATE: NORMAL
EXPIRATION DATE: NORMAL
FLUAV AG NPH QL: NEGATIVE
FLUBV AG NPH QL: NEGATIVE
HCT VFR BLDA CALC: 38.4 % (ref 38–51)
HETEROPH AB SER QL LA: NEGATIVE
HGB BLDA-MCNC: 11.7 G/DL (ref 12–17)
INTERNAL CONTROL: NORMAL
INTERNAL CONTROL: NORMAL
LYMPHOCYTES # BLD: 41 %
Lab: NORMAL
Lab: NORMAL
MCH, POC: 28.3
MCHC, POC: 30.5
MCV, POC: 92.7
MONOCYTES # BLD: 4.5 %
PLATELET # BLD: 238 10*3/MM3
PMV BLD: 6.8 FL
POC NEUTROPHIL: 54.5 %
RBC, POC: 4.14
RDW, POC: 13.2
WBC # BLD: 5.2 10*3/UL

## 2020-02-24 PROCEDURE — 99214 OFFICE O/P EST MOD 30 MIN: CPT | Performed by: INTERNAL MEDICINE

## 2020-02-24 PROCEDURE — 87804 INFLUENZA ASSAY W/OPTIC: CPT | Performed by: INTERNAL MEDICINE

## 2020-02-24 PROCEDURE — 85025 COMPLETE CBC W/AUTO DIFF WBC: CPT | Performed by: INTERNAL MEDICINE

## 2020-02-24 PROCEDURE — 86308 HETEROPHILE ANTIBODY SCREEN: CPT | Performed by: INTERNAL MEDICINE

## 2020-02-24 RX ORDER — PROMETHAZINE HYDROCHLORIDE AND CODEINE PHOSPHATE 6.25; 1 MG/5ML; MG/5ML
5 SYRUP ORAL NIGHTLY PRN
Qty: 60 ML | Refills: 0 | Status: SHIPPED | OUTPATIENT
Start: 2020-02-24 | End: 2020-03-09

## 2020-02-24 RX ORDER — ACETAMINOPHEN 500 MG
TABLET ORAL
Qty: 30 TABLET | Refills: 0
Start: 2020-02-24 | End: 2022-03-15

## 2020-02-24 RX ORDER — GUAIFENESIN AND DEXTROMETHORPHAN HYDROBROMIDE 1200; 60 MG/1; MG/1
TABLET, EXTENDED RELEASE ORAL
Qty: 28 EACH | Refills: 0
Start: 2020-02-24 | End: 2020-03-09

## 2020-02-24 RX ORDER — PROMETHAZINE HYDROCHLORIDE 25 MG/1
TABLET ORAL
COMMUNITY
Start: 2020-02-20 | End: 2020-03-09

## 2020-02-24 NOTE — PROGRESS NOTES
"Vomiting; Diarrhea; Cough; Sore Throat; and Fever      HPI  Carito Ritter is a 34 y.o. female RTC in acute care:   Notes that 2 weeks ago daughter had strep with \"no sx. She was vomiting\".  6 days ago pt went to work and started with sudden onset of vomiting and some vertigo type sx.  Had mild diarrhea.  Ran to [facility] on way home and had (-) strep test.  Sx seemed progressive and that night started with fever and cold chills.  Family member faced timed with her and called in XoFluza and took that same night.  Felt better next day but stayed home from work.  That next night had severe sore throat with deep cough.  Went back to [facility] next AM thinking \"surely I have strep and I am still vomiting up mucous in office\".  Repeat strep test again and was negative.  Told thought viral.  Given promethazine.  Since then, over weekend, it is 'not any better'.  Sleep is worse as has 'rattle in my chest'.  Has to sleep sitting up. Rattle is worse on R side more.  Last fever was this AM at 99 and has been last few days.  Is not SOA.  No sinus congestion.  Sx are all in throat and chest.   Meds: Ibuprofen and Dayquil, last dose this AM.  Xofluza x 1 dose last week.  Mucinex D last week.  Sick contacts:  and other daughter with cough and fever    Review of Systems   Constitution: Positive for chills, decreased appetite, fever and malaise/fatigue.   HENT: Positive for hoarse voice and sore throat. Negative for congestion, ear discharge and ear pain.    Respiratory: Positive for cough and sputum production (yellow in color). Negative for shortness of breath.    Skin: Negative for rash and suspicious lesions.   Musculoskeletal: Positive for myalgias and neck pain.   Gastrointestinal: Positive for nausea and vomiting (intermittent. ). Negative for abdominal pain and diarrhea (resolved now).   Neurological: Positive for headaches. Negative for dizziness, light-headedness and vertigo (resolved after last " "week).       The following portions of the patient's history were reviewed and updated as appropriate: allergies, current medications, past medical history, past social history and problem list.      Current Outpatient Medications:   •  busPIRone (BUSPAR) 10 MG tablet, TAKE ONE TABLET BY MOUTH TWICE A DAY, Disp: 60 tablet, Rfl: 0  •  clonazePAM (KlonoPIN) 0.5 MG tablet, Take 1 tablet by mouth Every Night. for anxiety, Disp: 90 tablet, Rfl: 1  •  Diclofenac Potassium 50 MG pack, Take 50 mg by mouth., Disp: , Rfl:   •  ibuprofen (MOTRIN IB) 200 MG tablet, 3 tabs PO q 8 hours (Patient taking differently: As Needed. 3 tabs PO q 8 hours), Disp: 30 tablet, Rfl: 0  •  LINZESS 145 MCG capsule capsule, TAKE ONE CAPSULE BY MOUTH EVERY MORNING BEFORE BREAKFAST, Disp: 30 capsule, Rfl: 3  •  montelukast (SINGULAIR) 10 MG tablet, Take 10 mg by mouth., Disp: , Rfl:   •  Tretinoin Microsphere (RETIN-A MICRO PUMP) 0.08 % gel, , Disp: , Rfl:   •  venlafaxine XR (EFFEXOR-XR) 150 MG 24 hr capsule, TAKE ONE CAPSULE BY MOUTH DAILY, Disp: 30 capsule, Rfl: 2  •  acetaminophen (TYLENOL) 500 MG tablet, 2 tabs PO Q 8 hours PRN, Disp: 30 tablet, Rfl: 0  •  Dextromethorphan-guaiFENesin (MUCINEX DM MAXIMUM STRENGTH)  MG tablet sustained-release 12 hour, One PO BID, Disp: 28 each, Rfl: 0  •  promethazine (PHENERGAN) 25 MG tablet, , Disp: , Rfl:   •  promethazine-codeine (PHENERGAN with CODEINE) 6.25-10 MG/5ML syrup, Take 5 mL by mouth At Night As Needed for Cough., Disp: 60 mL, Rfl: 0    Vitals:    02/24/20 0943   BP: 122/80   Pulse: 86   Temp: 99 °F (37.2 °C)   SpO2: 98%   Weight: 73 kg (161 lb)   Height: 165.1 cm (65\")     Body mass index is 26.79 kg/m².      Physical Exam   Constitutional: She is oriented to person, place, and time. She appears well-developed and well-nourished. She does not have a sickly appearance. She does not appear ill. No distress.   HENT:   Head: Normocephalic and atraumatic.   Right Ear: Tympanic membrane, " external ear and ear canal normal.   Left Ear: Tympanic membrane, external ear and ear canal normal.   Nose: No mucosal edema or rhinorrhea. Right sinus exhibits no maxillary sinus tenderness and no frontal sinus tenderness. Left sinus exhibits no maxillary sinus tenderness and no frontal sinus tenderness.   Mouth/Throat: Uvula is midline. Mucous membranes are not pale, not dry and not cyanotic. No oral lesions. No uvula swelling. Posterior oropharyngeal erythema present. No oropharyngeal exudate or posterior oropharyngeal edema.   Eyes: Pupils are equal, round, and reactive to light. Conjunctivae are normal. Right eye exhibits no discharge. Left eye exhibits no discharge.   Neck: Normal range of motion. Neck supple.   Cardiovascular: Normal rate, regular rhythm and normal heart sounds.   Pulmonary/Chest: Effort normal and breath sounds normal. No tachypnea. No respiratory distress. She has no decreased breath sounds. She has no wheezes. She has no rhonchi. She has no rales. Chest wall is not dull to percussion.   Lymphadenopathy:     She has no cervical adenopathy.   Neurological: She is alert and oriented to person, place, and time. No cranial nerve deficit.   Psychiatric: She has a normal mood and affect. Her behavior is normal.   Vitals reviewed.    Results for orders placed or performed in visit on 02/24/20   POC Influenza A / B   Result Value Ref Range    Rapid Influenza A Ag Negative Negative    Rapid Influenza B Ag Negative Negative    Internal Control Passed Passed    Lot Number [id number]     Expiration Date 12/11/2021    POC CBC With / Auto Diff   Result Value Ref Range    WBC 5.2     RBC 4.14     Hemoglobin 11.7 (A) 12.0 - 17.0 g/dL    Hematocrit 38.4 38 - 51 %    MCV 92.7     MCH 28.3     MCHC 30.5     RDW-CV 13.2     MPV 6.8     Platelets 238 10*3/mm3    Neutrophil Rel % 54.5 %    Monocyte Rel % 4.5 %    Lymphocyte Rel % 41.0 %   POC Infectious Mononucleosis Antibody   Result Value Ref Range    Monospot  Negative Negative    Internal Control Passed Passed    Lot Number [id number]     Expiration Date 10/31/2020        Assessment/ Plan  Diagnoses and all orders for this visit:    Acute pharyngitis, unspecified etiology  -     POC Influenza A / B  -     POC CBC With / Auto Diff  -     POC Infectious Mononucleosis Antibody  -     acetaminophen (TYLENOL) 500 MG tablet; 2 tabs PO Q 8 hours PRN    Fever, unspecified fever cause  -     POC Influenza A / B  -     POC CBC With / Auto Diff  -     POC Infectious Mononucleosis Antibody  -     promethazine-codeine (PHENERGAN with CODEINE) 6.25-10 MG/5ML syrup; Take 5 mL by mouth At Night As Needed for Cough.  -     Dextromethorphan-guaiFENesin (MUCINEX DM MAXIMUM STRENGTH)  MG tablet sustained-release 12 hour; One PO BID    Acute bronchitis, unspecified organism  -     POC Influenza A / B  -     POC CBC With / Auto Diff  -     POC Infectious Mononucleosis Antibody  -     promethazine-codeine (PHENERGAN with CODEINE) 6.25-10 MG/5ML syrup; Take 5 mL by mouth At Night As Needed for Cough.  -     Dextromethorphan-guaiFENesin (MUCINEX DM MAXIMUM STRENGTH)  MG tablet sustained-release 12 hour; One PO BID    Cough  -     promethazine-codeine (PHENERGAN with CODEINE) 6.25-10 MG/5ML syrup; Take 5 mL by mouth At Night As Needed for Cough.    Other orders  -     promethazine (PHENERGAN) 25 MG tablet        Return for Next scheduled follow up.      Discussion:  Carito Ritter is a 34 y.o. femalet to call or RTC if T> 100.5, SOA, double sickness, or failure to improve.       RTC In acute care (new issue to examiner) with 6 days of acute bronchitis/ sinusitis sx with associated N/V/D.  Pt exam is benign today with only noted mild erythema posterior pharynx.  CBC OK (mild Hgb drop noted, likely menstrual related as has been in past), flu and mono test negative.  I d/w pt my suspicion that this is viral and she is in typical time course for illness. I offered viral respiratory  panel, but pt declined after discussion. Reassured pt today.  Will start Mucinex DM BID and c/w PRN Tylenol/ Ibuprofen and Dayquil.  Rx for codeine cough syrup due to sleep interruption.  P

## 2020-02-26 DIAGNOSIS — Z00.00 HEALTHCARE MAINTENANCE: Primary | ICD-10-CM

## 2020-02-26 DIAGNOSIS — G25.81 RLS (RESTLESS LEGS SYNDROME): ICD-10-CM

## 2020-02-26 DIAGNOSIS — E55.9 AVITAMINOSIS D: ICD-10-CM

## 2020-03-05 LAB
25(OH)D3+25(OH)D2 SERPL-MCNC: 29.7 NG/ML (ref 30–100)
ALBUMIN SERPL-MCNC: 4.9 G/DL (ref 3.5–5.2)
ALBUMIN/GLOB SERPL: 1.9 G/DL
ALP SERPL-CCNC: 46 U/L (ref 39–117)
ALT SERPL-CCNC: 24 U/L (ref 1–33)
APPEARANCE UR: CLEAR
AST SERPL-CCNC: 16 U/L (ref 1–32)
BACTERIA #/AREA URNS HPF: ABNORMAL /HPF
BASOPHILS # BLD AUTO: 0.02 10*3/MM3 (ref 0–0.2)
BASOPHILS NFR BLD AUTO: 0.3 % (ref 0–1.5)
BILIRUB SERPL-MCNC: 0.5 MG/DL (ref 0.2–1.2)
BILIRUB UR QL STRIP: NEGATIVE
BUN SERPL-MCNC: 19 MG/DL (ref 6–20)
BUN/CREAT SERPL: 30.6 (ref 7–25)
CALCIUM SERPL-MCNC: 9.4 MG/DL (ref 8.6–10.5)
CHLORIDE SERPL-SCNC: 98 MMOL/L (ref 98–107)
CHOLEST SERPL-MCNC: 196 MG/DL (ref 0–200)
CO2 SERPL-SCNC: 26.3 MMOL/L (ref 22–29)
COLOR UR: YELLOW
CREAT SERPL-MCNC: 0.62 MG/DL (ref 0.57–1)
EOSINOPHIL # BLD AUTO: 0.03 10*3/MM3 (ref 0–0.4)
EOSINOPHIL NFR BLD AUTO: 0.5 % (ref 0.3–6.2)
EPI CELLS #/AREA URNS HPF: >10 /HPF (ref 0–10)
ERYTHROCYTE [DISTWIDTH] IN BLOOD BY AUTOMATED COUNT: 12.8 % (ref 12.3–15.4)
GLOBULIN SER CALC-MCNC: 2.6 GM/DL
GLUCOSE SERPL-MCNC: 76 MG/DL (ref 65–99)
GLUCOSE UR QL: NEGATIVE
HCT VFR BLD AUTO: 39.3 % (ref 34–46.6)
HDLC SERPL-MCNC: 58 MG/DL (ref 40–60)
HGB BLD-MCNC: 12.9 G/DL (ref 12–15.9)
HGB UR QL STRIP: NEGATIVE
IMM GRANULOCYTES # BLD AUTO: 0.03 10*3/MM3 (ref 0–0.05)
IMM GRANULOCYTES NFR BLD AUTO: 0.5 % (ref 0–0.5)
KETONES UR QL STRIP: NEGATIVE
LDLC SERPL CALC-MCNC: 125 MG/DL (ref 0–100)
LEUKOCYTE ESTERASE UR QL STRIP: NEGATIVE
LYMPHOCYTES # BLD AUTO: 1.86 10*3/MM3 (ref 0.7–3.1)
LYMPHOCYTES NFR BLD AUTO: 29.1 % (ref 19.6–45.3)
MCH RBC QN AUTO: 29.8 PG (ref 26.6–33)
MCHC RBC AUTO-ENTMCNC: 32.8 G/DL (ref 31.5–35.7)
MCV RBC AUTO: 90.8 FL (ref 79–97)
MICRO URNS: NORMAL
MICRO URNS: NORMAL
MONOCYTES # BLD AUTO: 0.57 10*3/MM3 (ref 0.1–0.9)
MONOCYTES NFR BLD AUTO: 8.9 % (ref 5–12)
MUCOUS THREADS URNS QL MICRO: PRESENT /HPF
NEUTROPHILS # BLD AUTO: 3.89 10*3/MM3 (ref 1.7–7)
NEUTROPHILS NFR BLD AUTO: 60.7 % (ref 42.7–76)
NITRITE UR QL STRIP: NEGATIVE
NRBC BLD AUTO-RTO: 0 /100 WBC (ref 0–0.2)
PH UR STRIP: 5.5 [PH] (ref 5–7.5)
PLATELET # BLD AUTO: 352 10*3/MM3 (ref 140–450)
POTASSIUM SERPL-SCNC: 4.1 MMOL/L (ref 3.5–5.2)
PROT SERPL-MCNC: 7.5 G/DL (ref 6–8.5)
PROT UR QL STRIP: NEGATIVE
RBC # BLD AUTO: 4.33 10*6/MM3 (ref 3.77–5.28)
RBC #/AREA URNS HPF: ABNORMAL /HPF (ref 0–2)
SODIUM SERPL-SCNC: 135 MMOL/L (ref 136–145)
SP GR UR: 1.03 (ref 1–1.03)
TRIGL SERPL-MCNC: 64 MG/DL (ref 0–150)
TSH SERPL DL<=0.005 MIU/L-ACNC: 1.4 UIU/ML (ref 0.27–4.2)
URINALYSIS REFLEX: NORMAL
UROBILINOGEN UR STRIP-MCNC: 0.2 MG/DL (ref 0.2–1)
VLDLC SERPL CALC-MCNC: 12.8 MG/DL
WBC # BLD AUTO: 6.4 10*3/MM3 (ref 3.4–10.8)
WBC #/AREA URNS HPF: ABNORMAL /HPF (ref 0–5)

## 2020-03-09 ENCOUNTER — OFFICE VISIT (OUTPATIENT)
Dept: INTERNAL MEDICINE | Facility: CLINIC | Age: 35
End: 2020-03-09

## 2020-03-09 VITALS
BODY MASS INDEX: 26.82 KG/M2 | SYSTOLIC BLOOD PRESSURE: 120 MMHG | HEART RATE: 87 BPM | OXYGEN SATURATION: 99 % | DIASTOLIC BLOOD PRESSURE: 88 MMHG | WEIGHT: 161 LBS | TEMPERATURE: 99.2 F | RESPIRATION RATE: 12 BRPM | HEIGHT: 65 IN

## 2020-03-09 DIAGNOSIS — Z00.00 HEALTHCARE MAINTENANCE: Primary | ICD-10-CM

## 2020-03-09 DIAGNOSIS — G25.81 RLS (RESTLESS LEGS SYNDROME): ICD-10-CM

## 2020-03-09 DIAGNOSIS — G43.109 MIGRAINE WITH AURA AND WITHOUT STATUS MIGRAINOSUS, NOT INTRACTABLE: ICD-10-CM

## 2020-03-09 DIAGNOSIS — Z91.09 ENVIRONMENTAL ALLERGIES: ICD-10-CM

## 2020-03-09 DIAGNOSIS — E55.9 AVITAMINOSIS D: ICD-10-CM

## 2020-03-09 DIAGNOSIS — L70.0 ACNE VULGARIS: ICD-10-CM

## 2020-03-09 DIAGNOSIS — K58.1 IRRITABLE BOWEL SYNDROME WITH CONSTIPATION: ICD-10-CM

## 2020-03-09 DIAGNOSIS — F41.1 GAD (GENERALIZED ANXIETY DISORDER): ICD-10-CM

## 2020-03-09 PROCEDURE — 99395 PREV VISIT EST AGE 18-39: CPT | Performed by: INTERNAL MEDICINE

## 2020-03-09 RX ORDER — DICLOFENAC POTASSIUM 50 MG/1
POWDER, FOR SOLUTION ORAL AS NEEDED
COMMUNITY

## 2020-03-09 NOTE — PROGRESS NOTES
"Annual Exam (review of medical issues)      HPI  Carito Ritter is a 34 y.o. female RTC In yearly CPE, review of medical issues:  Has been doing well.  Feels like got over pharyngitis that discussed last visit. \"It took a couple of days but I finally got over it\".   1. Anxiety/ DAPHNE, recurrence of anxiety in 2016 with new job, with prior diagnosis in past (with primary sx of SOA with (-) CT scan chest and PFT's and resolution with anxiety tx) - feels like is doing well.  Was working with counselor toward end of the year and felt like needed some help with a 'specific situation'.   Feels like was helpful and plans to get back in with her.  Is still on venlafaxine and buspar BID. Will take clonopin at night, every night.  Pt notes that transitioned to nightly use of clonopin after saw neurology and tx for RLS.   2. RLS - long term issue, was managed per neuro on Clonopin nightly. Is still on clonopin nightly and notes \"it is better, it seems to be a lot better with that\".  Feels like sx are worse around menstrual cycle. Is getting reasonably good sleep. Admits that never really feels rested in AM, but wonders if side effect from med.  No REJI sx per  and no snoring.      3. Migraine HA - managed per HA clinic at [place]. Is still doing Botox and helping.  Has met deductible already due to cost.  Is using Cambia packets only if gets migraine.  Is using cost program to get Rx through mail.   4. Acne, forehead - was controlled on Retin-A cream.  Saw derm this year and taken off med and placed on retinol cream OTC at night.  \"Helping both wrinkles and breakouts\".  5. Hx of Vitamin D deficiency - replete on labs. Monitor.   6. Environmental Allergies - on immunotherapy weekly per allergy, off Singulair daily, per allergy.        Review of Systems   Constitution: Negative for chills, fever, malaise/fatigue, weight gain and weight loss.   HENT: Negative for congestion, hearing loss, hoarse voice, odynophagia and " sore throat.    Eyes: Negative for discharge, double vision, pain and redness.        Last eye exam 2/2019; wears glasses     Cardiovascular: Negative for chest pain, dyspnea on exertion, irregular heartbeat, near-syncope, palpitations and syncope.   Respiratory: Negative for cough and shortness of breath.    Endocrine: Negative for polydipsia, polyphagia and polyuria.   Hematologic/Lymphatic: Negative for bleeding problem. Does not bruise/bleed easily.   Skin: Negative for rash and suspicious lesions.   Musculoskeletal: Negative for joint pain, joint swelling, muscle cramps, muscle weakness and myalgias.   Gastrointestinal: Negative for constipation, diarrhea, dysphagia, heartburn, nausea and vomiting.   Genitourinary: Negative for dysuria, frequency, hematuria and hesitancy.   Neurological: Negative for dizziness, headaches and light-headedness.   Psychiatric/Behavioral: Negative for depression. The patient does not have insomnia and is not nervous/anxious.    Allergic/Immunologic: Negative for environmental allergies and persistent infections.       Problem List:    Patient Active Problem List   Diagnosis   • DAPHNE (generalized anxiety disorder)   • Avitaminosis D   • Panic attack   • Dermatofibroma of lower leg   • Migraine with aura and without status migrainosus, not intractable   • Irritable bowel syndrome with constipation   • RLS (restless legs syndrome)   • Acne vulgaris   • Environmental allergies       Medical History:    Past Medical History:   Diagnosis Date   • Acne vulgaris 3/4/2019    Sees derm annually    • Anxiety    • Environmental allergies 3/4/2019   • Generalized anxiety disorder    • Hx of hordeolum     recurrent in L eye   • Migraine headache    • Vitamin D deficiency         Social History:    Social History     Socioeconomic History   • Marital status:      Spouse name: Not on file   • Number of children: 2   • Years of education: Not on file   • Highest education level: Not on file    Occupational History   • Occupation: Teacher     Comment: Male [employer]   Tobacco Use   • Smoking status: Never Smoker   • Smokeless tobacco: Never Used   Substance and Sexual Activity   • Alcohol use: Yes     Comment: <1 wine week   • Drug use: No   • Sexual activity: Yes     Partners: Male     Comment:  only; no hx STD's   Lifestyle   • Physical activity:     Days per week: 7 days     Minutes per session: 30 min   • Stress: Not on file   Social History Narrative    Diet - overall healthy; eats fruits and veggies    Exercise - None; 2019 7 days/ week exercise in evening to get to 43884 steps daily    Caffeine - coffee in AM, 10oz daily       Family History:   Family History   Problem Relation Age of Onset   • Asthma Mother    • Breast cancer Mother         s/p mastectomy, stage 0   • Hyperthyroidism Father    • Nephrolithiasis Father    • Diabetes Maternal Grandmother    • Lung cancer Maternal Grandfather    • Breast cancer Paternal Grandmother    • Diabetes Paternal Grandmother    • Hyperthyroidism Paternal Grandmother    • ADD / ADHD Son    • Malig Hyperthermia Neg Hx        Surgical History:   Past Surgical History:   Procedure Laterality Date   • BREAST AUGMENTATION  2007   • BREAST SURGERY      cyst aspiration Right breast 2012   • COLONOSCOPY N/A 1/31/2018    One 7 mm polyp in the transverse colon, NBIH. Path: Hyperplastic polyp.    • EXTERNAL EAR SURGERY      ear pining 2013 cosmetic   • EYE SURGERY      lasik   • TONSILLECTOMY AND ADENOIDECTOMY      2005         Current Outpatient Medications:   •  busPIRone (BUSPAR) 10 MG tablet, TAKE ONE TABLET BY MOUTH TWICE A DAY, Disp: 60 tablet, Rfl: 0  •  clonazePAM (KlonoPIN) 0.5 MG tablet, Take 1 tablet by mouth Every Night. for anxiety, Disp: 90 tablet, Rfl: 1  •  LINZESS 145 MCG capsule capsule, TAKE ONE CAPSULE BY MOUTH EVERY MORNING BEFORE BREAKFAST, Disp: 30 capsule, Rfl: 3  •  venlafaxine XR (EFFEXOR-XR) 150 MG 24 hr capsule, TAKE ONE CAPSULE BY MOUTH  DAILY, Disp: 30 capsule, Rfl: 2  •  acetaminophen (TYLENOL) 500 MG tablet, 2 tabs PO Q 8 hours PRN, Disp: 30 tablet, Rfl: 0  •  Diclofenac Potassium 50 MG pack, Take  by mouth As Needed (migraines)., Disp: , Rfl:     Vitals:    03/09/20 1452   BP: 120/88   Pulse: 87   Resp: 12   Temp: 99.2 °F (37.3 °C)   SpO2: 99%     Body mass index is 26.79 kg/m².    Physical Exam   Constitutional: She is oriented to person, place, and time. She appears well-developed and well-nourished. No distress.   HENT:   Head: Normocephalic and atraumatic.   Right Ear: Hearing, tympanic membrane, external ear and ear canal normal.   Left Ear: Hearing, tympanic membrane, external ear and ear canal normal.   Nose: Nose normal.   Mouth/Throat: Uvula is midline, oropharynx is clear and moist and mucous membranes are normal. No oropharyngeal exudate.   Eyes: Pupils are equal, round, and reactive to light. Conjunctivae, EOM and lids are normal. Right eye exhibits no discharge. Left eye exhibits no discharge. No scleral icterus.   Neck: Trachea normal, normal range of motion and full passive range of motion without pain. Neck supple. Carotid bruit is not present. No thyroid mass and no thyromegaly present.   Cardiovascular: Normal rate, regular rhythm, S1 normal, S2 normal, normal heart sounds and intact distal pulses. Exam reveals no gallop and no friction rub.   No murmur heard.  Pulses:       Radial pulses are 2+ on the right side, and 2+ on the left side.        Dorsalis pedis pulses are 2+ on the right side, and 2+ on the left side.        Posterior tibial pulses are 2+ on the right side, and 2+ on the left side.   Pulmonary/Chest: Effort normal and breath sounds normal. No respiratory distress. She has no wheezes. She has no rhonchi. She has no rales.   Abdominal: Soft. Normal appearance and bowel sounds are normal. She exhibits no distension and no mass. There is no hepatosplenomegaly. There is no tenderness. There is no rebound and no  guarding.   Musculoskeletal: Normal range of motion. She exhibits no edema.     Vascular Status -  Her right foot exhibits normal foot vasculature  and no edema. Her left foot exhibits normal foot vasculature  and no edema.  Lymphadenopathy:     She has no cervical adenopathy.     She has no axillary adenopathy.        Right: No inguinal adenopathy present.        Left: No inguinal adenopathy present.   Neurological: She is alert and oriented to person, place, and time. She has normal strength and normal reflexes. She displays no tremor. No cranial nerve deficit or sensory deficit. She exhibits normal muscle tone. Gait normal.   Skin: Skin is warm and dry. No rash noted.   Psychiatric: She has a normal mood and affect. Her behavior is normal. Thought content normal. Cognition and memory are normal.   Vitals reviewed.      Assessment/ Plan  Diagnoses and all orders for this visit:    Healthcare maintenance    Migraine with aura and without status migrainosus, not intractable    Avitaminosis D    Irritable bowel syndrome with constipation    Acne vulgaris    Environmental allergies    DAPHNE (generalized anxiety disorder)    RLS (restless legs syndrome)    Other orders  -     Diclofenac Potassium 50 MG pack; Take  by mouth As Needed (migraines).        Return in about 1 year (around 3/9/2021) for Annual physical.      Discussion:  Carito Ritter is a 34 y.o. female RTC In yearly CPE, review of medical issues:    1. Anxiety/ DAPHNE, recurrence of anxiety in 2016 with new job, with prior diagnosis in past (with primary sx of SOA with (-) CT scan chest and PFT's and resolution with anxiety tx) - overall controlled on venlafaxine and buspar. Uses Clonopin as PRN for panic --> nightly after RLS dx.  Consent updated in chart today.   2. RLS - long term issue, was managed per neuro on Clonopin nightly, transition of med here.  Sx largely controlled.  No REJI sx per / snoring. Trend iron stores next visit as had  variable levels in past.   3. Migraine HA - managed per HA clinic at [place], on Botox  With PRN Cambia packets abortive strategy.      4. RLQ pain, resolved with start of Linzess for IBS per GI, recurred in early 2019 - s/p GI eval 2/2019 with increase in Linzess dosing, sx controlled.   Recall C-scope negative 1/2018 (1 hyperplastic polyp) and pelvic U/S and CT A/P negative in 2017.    5. Acne, forehead - off Retin-A cream, on OTC retinol cream.     6. Hx of Vitamin D deficiency - near replete on labs. Monitor.   7. Environmental Allergies - on immunotherapy weekly per allergy, off Singulair.    8. HM - labs d/w pt; Flu - next season;  Tdap/  Hep A - UTD; Pap/ Breast Exam UTD 12/2019 with Gyn, appt next week; c/w exercise.     RTC 1 year CPE, F labs prior

## 2020-03-15 DIAGNOSIS — F41.1 GAD (GENERALIZED ANXIETY DISORDER): ICD-10-CM

## 2020-03-15 DIAGNOSIS — G25.81 RLS (RESTLESS LEGS SYNDROME): ICD-10-CM

## 2020-03-16 RX ORDER — BUSPIRONE HYDROCHLORIDE 10 MG/1
TABLET ORAL
Qty: 60 TABLET | Refills: 1 | Status: SHIPPED | OUTPATIENT
Start: 2020-03-16 | End: 2020-05-14

## 2020-03-16 RX ORDER — CLONAZEPAM 0.5 MG/1
0.5 TABLET ORAL NIGHTLY
Qty: 90 TABLET | Refills: 1 | Status: SHIPPED | OUTPATIENT
Start: 2020-03-16 | End: 2020-08-07

## 2020-05-14 RX ORDER — BUSPIRONE HYDROCHLORIDE 10 MG/1
TABLET ORAL
Qty: 60 TABLET | Refills: 5 | Status: SHIPPED | OUTPATIENT
Start: 2020-05-14 | End: 2020-11-05

## 2020-06-03 RX ORDER — LINACLOTIDE 145 UG/1
CAPSULE, GELATIN COATED ORAL
Qty: 30 CAPSULE | Refills: 2 | OUTPATIENT
Start: 2020-06-03

## 2020-08-07 DIAGNOSIS — F41.1 GAD (GENERALIZED ANXIETY DISORDER): ICD-10-CM

## 2020-08-07 DIAGNOSIS — G25.81 RLS (RESTLESS LEGS SYNDROME): ICD-10-CM

## 2020-08-07 RX ORDER — CLONAZEPAM 0.5 MG/1
0.5 TABLET ORAL NIGHTLY
Qty: 90 TABLET | Refills: 0 | Status: SHIPPED | OUTPATIENT
Start: 2020-08-07 | End: 2020-10-27 | Stop reason: SDUPTHER

## 2020-10-12 ENCOUNTER — OFFICE VISIT (OUTPATIENT)
Dept: INTERNAL MEDICINE | Facility: CLINIC | Age: 35
End: 2020-10-12

## 2020-10-12 VITALS
BODY MASS INDEX: 25.99 KG/M2 | TEMPERATURE: 96.8 F | SYSTOLIC BLOOD PRESSURE: 110 MMHG | HEART RATE: 106 BPM | WEIGHT: 156 LBS | HEIGHT: 65 IN | DIASTOLIC BLOOD PRESSURE: 80 MMHG | OXYGEN SATURATION: 97 %

## 2020-10-12 DIAGNOSIS — F41.1 GAD (GENERALIZED ANXIETY DISORDER): ICD-10-CM

## 2020-10-12 DIAGNOSIS — F41.0 PANIC ATTACK: ICD-10-CM

## 2020-10-12 DIAGNOSIS — F52.31 ANORGASMIA OF FEMALE: Primary | ICD-10-CM

## 2020-10-12 DIAGNOSIS — G25.81 RLS (RESTLESS LEGS SYNDROME): ICD-10-CM

## 2020-10-12 PROCEDURE — 99214 OFFICE O/P EST MOD 30 MIN: CPT | Performed by: INTERNAL MEDICINE

## 2020-10-12 RX ORDER — VENLAFAXINE HYDROCHLORIDE 75 MG/1
75 CAPSULE, EXTENDED RELEASE ORAL DAILY
Qty: 30 CAPSULE | Refills: 0 | Status: SHIPPED | OUTPATIENT
Start: 2020-10-12 | End: 2021-03-11

## 2020-10-12 NOTE — PROGRESS NOTES
Med Management      HPI  Carito Ritter is a 35 y.o. female RTC in acute care: Last year was at Gyn appt and started talking about orgasm and realized had never had orgasm ever.  Started to see counselor and worked with them and was able to achieve orgasm by late last year. Still struggled a bit to achieve with .  Started working with pelvic floor PT.  PT has been helpful and notes has increased sensitivity, but 'still struggling'.  Now decided to see sex therapist, Caio Arrieta, and working with her as a couple along with .  Told from her than Effexor can cause this side effect.  Suggested to talk to me about meds and then to c/w talk therapy with her as well.  Pt notes has been on med entire time that has been sexually active.   Recalls that had effexor increased in past by neurology for migraines.   Is really doing 'other things to' eating better and exercising regularly. Really working on trying to get health in better order.   Is in therapy weekly, now to [facility].   Tried to take self off clonopin at night and could not tolerate. Legs were jumping around and 'it was awful'.  Did take course of iron in past, now off oral iron.       Review of Systems   Constitution: Negative for chills and fever.   Psychiatric/Behavioral: Negative for altered mental status, depression and hallucinations. The patient is not nervous/anxious (overall controlled).        The following portions of the patient's history were reviewed and updated as appropriate: allergies, current medications, past medical history, past social history and problem list.      Current Outpatient Medications:   •  acetaminophen (TYLENOL) 500 MG tablet, 2 tabs PO Q 8 hours PRN, Disp: 30 tablet, Rfl: 0  •  busPIRone (BUSPAR) 10 MG tablet, Take 1 tablet by mouth 2 (Two) Times a Day., Disp: 60 tablet, Rfl: 5  •  clonazePAM (KlonoPIN) 0.5 MG tablet, Take 1 tablet by mouth Every Night. for anxiety, Disp: 90 tablet, Rfl: 0  •  Diclofenac  "Potassium 50 MG pack, Take  by mouth As Needed (migraines)., Disp: , Rfl:   •  LINZESS 145 MCG capsule capsule, TAKE ONE CAPSULE BY MOUTH EVERY MORNING BEFORE BREAKFAST, Disp: 30 capsule, Rfl: 3  •  venlafaxine XR (Effexor XR) 75 MG 24 hr capsule, Take 1 capsule by mouth Daily., Disp: 30 capsule, Rfl: 0    Vitals:    10/12/20 0943   BP: 110/80   Pulse: 106   Temp: 96.8 °F (36 °C)   SpO2: 97%   Weight: 70.8 kg (156 lb)   Height: 165.1 cm (65\")     Body mass index is 25.96 kg/m².      Physical Exam  Vitals signs reviewed.   Constitutional:       General: She is not in acute distress.     Appearance: She is well-developed. She is not ill-appearing or toxic-appearing.   HENT:      Head: Normocephalic and atraumatic.   Pulmonary:      Effort: Pulmonary effort is normal. No respiratory distress.   Neurological:      Mental Status: She is alert and oriented to person, place, and time.      Gait: Gait normal.   Psychiatric:         Mood and Affect: Mood normal.         Behavior: Behavior normal.         Thought Content: Thought content normal.         Judgment: Judgment normal.      Comments: Anxious at times describing hx of events to me  Admits is 'embarrassing.            Assessment/ Plan  Diagnoses and all orders for this visit:    Anorgasmia of female    DAPHNE (generalized anxiety disorder)    Panic attack    RLS (restless legs syndrome)    Other orders  -     venlafaxine XR (Effexor XR) 75 MG 24 hr capsule; Take 1 capsule by mouth Daily.        Return in about 2 weeks (around 10/26/2020).      Discussion:  Carito Ritter is a 35 y.o. female RTC in acute care (new issue to examiner) with noted anorgasmia discovered while at Gyn and discussing sexual health.  Pt has started seeing talk therapist and then sex therapist, Dr. Caio Arrieta and also attending PT for pelvic floor rehab. Pt is making strides but concern for side effect of SSRI/ SNRI that she has been on for years.  I d/w pt that common side effect issue " and the near class-wide side effect profile ( ? Exception of Viibryd).  I d/w pt wean off buspar vs. effexor or consult with psychiatry but pt really interested to see how she does off effexor after d/w her mental health care providers.  We discussed the lengthy duration of coming off this med, but will start by lowering to 75mg XR daily and reassess how pt is doing in 2 weeks on telemed visit. I think we will not be able to come down any faster than one dose ~1 month given long duration on med, will address in detail plan at f/u.    Pt will c/w weekly therapy with psychology while we are making this med transition.    Monitor migraine response to med lowering as had been increased in past by neuro for migraine tx.      RTC 2 weeks, telemed visit.     > 15/25 minutes was spent in counseling of the following topics:, treatment options, risks/benefits of treatment options

## 2020-10-27 ENCOUNTER — OFFICE VISIT (OUTPATIENT)
Dept: INTERNAL MEDICINE | Facility: CLINIC | Age: 35
End: 2020-10-27

## 2020-10-27 DIAGNOSIS — G25.81 RLS (RESTLESS LEGS SYNDROME): ICD-10-CM

## 2020-10-27 DIAGNOSIS — F52.31 ANORGASMIA OF FEMALE: Primary | ICD-10-CM

## 2020-10-27 DIAGNOSIS — F41.1 GAD (GENERALIZED ANXIETY DISORDER): ICD-10-CM

## 2020-10-27 PROCEDURE — 99214 OFFICE O/P EST MOD 30 MIN: CPT | Performed by: INTERNAL MEDICINE

## 2020-10-27 RX ORDER — VENLAFAXINE 25 MG/1
TABLET ORAL
Qty: 42 TABLET | Refills: 0 | Status: SHIPPED | OUTPATIENT
Start: 2020-10-27 | End: 2021-03-11

## 2020-10-27 RX ORDER — CLONAZEPAM 0.5 MG/1
0.5 TABLET ORAL 3 TIMES DAILY PRN
Qty: 90 TABLET | Refills: 0 | Status: SHIPPED | OUTPATIENT
Start: 2020-10-27 | End: 2020-11-05

## 2020-10-27 RX ORDER — VENLAFAXINE HYDROCHLORIDE 37.5 MG/1
37.5 CAPSULE, EXTENDED RELEASE ORAL DAILY
Qty: 30 CAPSULE | Refills: 0 | Status: SHIPPED | OUTPATIENT
Start: 2020-10-27 | End: 2021-03-11

## 2020-10-27 NOTE — PROGRESS NOTES
"No chief complaint on file.      HPI  Carito Ritter is a 35 y.o. female presents in f/u via video link in Epic. This visit is occurring during the COVID 19 pandemic.    You have chosen to receive care through a telehealth visit.  Do you consent to use a video/audio connection for your medical care today? Yes    Still seeing sex therapist qoweek.  Did find new therapist for 'just anxiety issues' that is in network.  Got referral from Dr. Hodgson and has meet with Janay Em PsyD and has met with her twice already.  \"We got me doing all kinds of stuff\".  Feels like is helping and doing well.  Admits that has a few moments of peaked anxiety  During since on lower dose of effexor. Is still using clonopin nightly but rarely using additional clonopin during day. Needs refill today as using faster that normally when using once nightly.   Recalls, clonopin was originally Rx'd by Dr. Geronimo in neurology.      Review of Systems   Constitution: Negative for chills, decreased appetite, fever and night sweats (resolved after one week; sx were during day to).   Gastrointestinal: Negative for nausea and vomiting.   Psychiatric/Behavioral: Negative for altered mental status and depression. The patient is nervous/anxious (overall controlled).        The following portions of the patient's history were reviewed and updated as appropriate: allergies, current medications, past medical history, past social history and problem list.      Current Outpatient Medications:   •  acetaminophen (TYLENOL) 500 MG tablet, 2 tabs PO Q 8 hours PRN, Disp: 30 tablet, Rfl: 0  •  busPIRone (BUSPAR) 10 MG tablet, Take 1 tablet by mouth 2 (Two) Times a Day., Disp: 60 tablet, Rfl: 5  •  clonazePAM (KlonoPIN) 0.5 MG tablet, Take 1 tablet by mouth 3 (Three) Times a Day As Needed for Anxiety. for anxiety, Disp: 90 tablet, Rfl: 0  •  Diclofenac Potassium 50 MG pack, Take  by mouth As Needed (migraines)., Disp: , Rfl:   •  LINZESS 145 MCG capsule " capsule, TAKE ONE CAPSULE BY MOUTH EVERY MORNING BEFORE BREAKFAST, Disp: 30 capsule, Rfl: 3  •  venlafaxine (EFFEXOR) 25 MG tablet, After complete 37.5mg XR month, start 25mg PO BID x 2 weeks, then 25mg PO daily x 2 weeks, then off, Disp: 42 tablet, Rfl: 0  •  venlafaxine XR (Effexor XR) 37.5 MG 24 hr capsule, Take 1 capsule by mouth Daily., Disp: 30 capsule, Rfl: 0  •  venlafaxine XR (Effexor XR) 75 MG 24 hr capsule, Take 1 capsule by mouth Daily., Disp: 30 capsule, Rfl: 0    There were no vitals filed for this visit.  There is no height or weight on file to calculate BMI.      Physical Exam  Vitals signs reviewed.   Constitutional:       General: She is not in acute distress.     Appearance: She is well-developed.   HENT:      Head: Normocephalic.   Pulmonary:      Effort: Pulmonary effort is normal. No respiratory distress.   Neurological:      Mental Status: She is alert and oriented to person, place, and time.   Psychiatric:         Behavior: Behavior normal.         Thought Content: Thought content normal.         Assessment/ Plan  Diagnoses and all orders for this visit:    Anorgasmia of female    DAPHNE (generalized anxiety disorder)  -     venlafaxine XR (Effexor XR) 37.5 MG 24 hr capsule; Take 1 capsule by mouth Daily.  -     venlafaxine (EFFEXOR) 25 MG tablet; After complete 37.5mg XR month, start 25mg PO BID x 2 weeks, then 25mg PO daily x 2 weeks, then off  -     clonazePAM (KlonoPIN) 0.5 MG tablet; Take 1 tablet by mouth 3 (Three) Times a Day As Needed for Anxiety. for anxiety    RLS (restless legs syndrome)  -     clonazePAM (KlonoPIN) 0.5 MG tablet; Take 1 tablet by mouth 3 (Three) Times a Day As Needed for Anxiety. for anxiety        Return for Next scheduled follow up.      Discussion:  Carito Ritter is a 35 y.o. female RTC in short interval f/u via video link in Epic. Pt is currently managing her anxiety on Effexor taper, buspirone, and new addition of talk therapy as she works on [employer]  concern of anorgasmia, thought contributed to by long term SSRI/ SNRI use.  Pt tolerating Effexor wean overall well with about one week of sx when tapered initial dose.  I am pleased overall at pt tolerance of wean and will aim for 3 month total wean of this med, as she has been on it for years. Taper as outlined with Rx today.  Pt having rare peak of anxiety as using Clonopin PRN (as she has in past), refilled med today. Pt continues on clonopin nightly for RLS sx.  Pt has new therapist and finding this helpful as way to manage her anxiety.  Remains qoweek with sex therapist.  C/W addition of exercise (running).  Will reassess pt in 4 months at annual visit, but pt knows to call sooner if struggling with taper, side effects, emergence of uncontrolled anxiety. I offered to speak with her therapist to coordinate care if desires.     >15/25 minutes was spent in counseling of the following topics:, impressions, treatment options, risks/benefits of treatment options

## 2020-10-28 ENCOUNTER — OFFICE VISIT (OUTPATIENT)
Dept: GASTROENTEROLOGY | Facility: CLINIC | Age: 35
End: 2020-10-28

## 2020-10-28 VITALS — BODY MASS INDEX: 26.16 KG/M2 | WEIGHT: 157 LBS | TEMPERATURE: 97 F | HEIGHT: 65 IN

## 2020-10-28 DIAGNOSIS — K58.1 IRRITABLE BOWEL SYNDROME WITH CONSTIPATION: Primary | ICD-10-CM

## 2020-10-28 DIAGNOSIS — K63.5 HYPERPLASTIC COLONIC POLYP, UNSPECIFIED PART OF COLON: ICD-10-CM

## 2020-10-28 PROCEDURE — 99214 OFFICE O/P EST MOD 30 MIN: CPT | Performed by: NURSE PRACTITIONER

## 2020-10-28 NOTE — PROGRESS NOTES
Chief Complaint   Patient presents with   • Irritable Bowel Syndrome       Carito Ritter is a  35 y.o. female here for a follow up visit for IBS.    HPI  35-year-old female presents today for follow-up visit for IBS-C.  She is a patient of Dr. Vasquez.  She was last seen in the office on 5/13/2019.  She has a history of IBS-C and admits she was doing really well on Linzess 145 daily.  Unfortunately she ran out of refills and was not able to get in here quickly enough so she started doing magnesium citrate at night as needed.  This has not been very effective.  She also has a history of hyperplastic colon polyps.  Her last colonoscopy was on 1/2018.  She will be due again in 2023.  She denies any dysphagia, reflux, abdominal pain, nausea and vomiting, diarrhea, rectal bleeding or melena.  She was appetite is good and her weight is stable.  Past Medical History:   Diagnosis Date   • Acne vulgaris 3/4/2019    Sees derm annually    • Anxiety    • Environmental allergies 3/4/2019   • Generalized anxiety disorder    • Hx of hordeolum     recurrent in [facility]   • IBS (irritable bowel syndrome)    • Migraine headache    • Vitamin D deficiency        Past Surgical History:   Procedure Laterality Date   • BREAST AUGMENTATION  2007   • BREAST SURGERY      cyst aspiration Right breast 2012   • COLONOSCOPY N/A 1/31/2018    One 7 mm polyp in the transverse colon, NBIH. Path: Hyperplastic polyp.    • EXTERNAL EAR SURGERY      ear pining 2013 cosmetic   • EYE SURGERY      lasik   • TONSILLECTOMY AND ADENOIDECTOMY      2005       Scheduled Meds:    Continuous Infusions:No current facility-administered medications for this visit.       PRN Meds:.    No Known Allergies    Social History     Socioeconomic History   • Marital status:      Spouse name: Not on file   • Number of children: 2   • Years of education: Not on file   • Highest education level: Not on file   Occupational History   • Occupation: Teacher     Comment:  [employer]   Tobacco Use   • Smoking status: Never Smoker   • Smokeless tobacco: Never Used   Substance and Sexual Activity   • Alcohol use: Yes     Comment: <1 wine week   • Drug use: No   • Sexual activity: Yes     Partners: Male     Comment:  only; no hx STD's   Lifestyle   • Physical activity     Days per week: 7 days     Minutes per session: 30 min   • Stress: Not on file   Social History Narrative    Diet - overall healthy; eats fruits and veggies    Exercise - None; 2019 7 days/ week exercise in evening to get to 50649 steps daily    Caffeine - coffee in AM, 10oz daily       Family History   Problem Relation Age of Onset   • Asthma Mother    • Breast cancer Mother         s/p mastectomy, stage 0   • Hyperthyroidism Father    • Nephrolithiasis Father    • Diabetes Maternal Grandmother    • Lung cancer Maternal Grandfather    • Breast cancer Paternal Grandmother    • Diabetes Paternal Grandmother    • Hyperthyroidism Paternal Grandmother    • ADD / ADHD Son    • Malig Hyperthermia Neg Hx        Review of Systems   Constitutional: Negative for appetite change, chills, diaphoresis, fatigue, fever and unexpected weight change.   HENT: Negative for nosebleeds, postnasal drip, sore throat, trouble swallowing and voice change.    Respiratory: Negative for cough, choking, chest tightness, shortness of breath and wheezing.    Cardiovascular: Negative for chest pain, palpitations and leg swelling.   Gastrointestinal: Positive for abdominal distention and constipation. Negative for abdominal pain, anal bleeding, blood in stool, diarrhea, nausea, rectal pain and vomiting.   Endocrine: Negative for polydipsia, polyphagia and polyuria.   Musculoskeletal: Negative for gait problem.   Skin: Negative for rash and wound.   Allergic/Immunologic: Negative for food allergies.   Neurological: Negative for dizziness, speech difficulty and light-headedness.   Psychiatric/Behavioral: Negative for confusion, self-injury, sleep  disturbance and suicidal ideas.       Vitals:    10/28/20 1328   Temp: 97 °F (36.1 °C)       Physical Exam  Constitutional:       General: She is not in acute distress.     Appearance: She is well-developed. She is not ill-appearing.   HENT:      Head: Normocephalic.   Eyes:      Pupils: Pupils are equal, round, and reactive to light.   Cardiovascular:      Rate and Rhythm: Normal rate and regular rhythm.      Heart sounds: Normal heart sounds.   Pulmonary:      Effort: Pulmonary effort is normal.      Breath sounds: Normal breath sounds.   Abdominal:      General: Bowel sounds are normal. There is distension.      Palpations: Abdomen is soft. There is no mass.      Tenderness: There is no abdominal tenderness. There is no guarding or rebound.      Hernia: No hernia is present.   Musculoskeletal: Normal range of motion.   Skin:     General: Skin is warm and dry.   Neurological:      Mental Status: She is alert and oriented to person, place, and time.   Psychiatric:         Speech: Speech normal.         Behavior: Behavior normal.         Judgment: Judgment normal.         No radiology results for the last 7 days     Assessment and plan     1. Irritable bowel syndrome with constipation    2. Hyperplastic colonic polyp, unspecified part of colon    IBS-C is not well controlled at this time with her being off of Linzess.  She would really like to get back on Linzess but try the 72 daily and see how that does.  I will go ahead and send her in a refill for 1 year.  Patient to call the office with any issues.  Next screening colonoscopy will be due in 2023.  Patient to follow-up with me in 1 year.

## 2020-11-05 DIAGNOSIS — F41.1 GAD (GENERALIZED ANXIETY DISORDER): ICD-10-CM

## 2020-11-05 DIAGNOSIS — G25.81 RLS (RESTLESS LEGS SYNDROME): ICD-10-CM

## 2020-11-05 RX ORDER — BUSPIRONE HYDROCHLORIDE 10 MG/1
TABLET ORAL
Qty: 60 TABLET | Refills: 4 | Status: SHIPPED | OUTPATIENT
Start: 2020-11-05 | End: 2021-04-06

## 2020-11-05 RX ORDER — CLONAZEPAM 0.5 MG/1
TABLET ORAL
Qty: 90 TABLET | Refills: 1 | Status: SHIPPED | OUTPATIENT
Start: 2020-11-05 | End: 2021-04-06

## 2020-11-24 DIAGNOSIS — F41.1 GAD (GENERALIZED ANXIETY DISORDER): ICD-10-CM

## 2020-11-24 RX ORDER — VENLAFAXINE 25 MG/1
TABLET ORAL
Qty: 42 TABLET | Refills: 0 | OUTPATIENT
Start: 2020-11-24

## 2020-11-26 DIAGNOSIS — F41.1 GAD (GENERALIZED ANXIETY DISORDER): ICD-10-CM

## 2020-12-01 RX ORDER — VENLAFAXINE HYDROCHLORIDE 37.5 MG/1
CAPSULE, EXTENDED RELEASE ORAL
Qty: 30 CAPSULE | Refills: 0 | OUTPATIENT
Start: 2020-12-01

## 2021-02-03 ENCOUNTER — TELEPHONE (OUTPATIENT)
Dept: GASTROENTEROLOGY | Facility: CLINIC | Age: 36
End: 2021-02-03

## 2021-02-03 NOTE — TELEPHONE ENCOUNTER
----- Message from Marietta De La Cruz sent at 2/3/2021  2:37 PM EST -----  Regarding: Rx Cost  Please call patient back regarding the cost of her RX

## 2021-02-03 NOTE — TELEPHONE ENCOUNTER
She can try looking at good Rx coupons as well for it.  There is no generic alternative.  She can find out from her insurance of [employer], [employer] or [employer] would be any cheaper than the Linzess and then we can always give her samples of those to trial.

## 2021-02-03 NOTE — TELEPHONE ENCOUNTER
Call to pt.  State taking linzess 72 mcg daily with good results. However, cost is $480/month.      Asking if any more economical viable alternative.     Question to ARABELLA Gunn.

## 2021-03-02 DIAGNOSIS — E55.9 AVITAMINOSIS D: ICD-10-CM

## 2021-03-02 DIAGNOSIS — Z11.59 NEED FOR HEPATITIS C SCREENING TEST: ICD-10-CM

## 2021-03-02 DIAGNOSIS — Z00.00 HEALTHCARE MAINTENANCE: Primary | ICD-10-CM

## 2021-03-02 DIAGNOSIS — G25.81 RLS (RESTLESS LEGS SYNDROME): ICD-10-CM

## 2021-03-09 LAB
25(OH)D3+25(OH)D2 SERPL-MCNC: 37.3 NG/ML (ref 30–100)
ALBUMIN SERPL-MCNC: 4.5 G/DL (ref 3.5–5.2)
ALBUMIN/GLOB SERPL: 2 G/DL
ALP SERPL-CCNC: 44 U/L (ref 39–117)
ALT SERPL-CCNC: 22 U/L (ref 1–33)
APPEARANCE UR: CLEAR
AST SERPL-CCNC: 18 U/L (ref 1–32)
BACTERIA #/AREA URNS HPF: NORMAL /HPF
BASOPHILS # BLD AUTO: 0.03 10*3/MM3 (ref 0–0.2)
BASOPHILS NFR BLD AUTO: 0.9 % (ref 0–1.5)
BILIRUB SERPL-MCNC: 0.3 MG/DL (ref 0–1.2)
BILIRUB UR QL STRIP: NEGATIVE
BUN SERPL-MCNC: 13 MG/DL (ref 6–20)
BUN/CREAT SERPL: 19.7 (ref 7–25)
CALCIUM SERPL-MCNC: 9.2 MG/DL (ref 8.6–10.5)
CHLORIDE SERPL-SCNC: 102 MMOL/L (ref 98–107)
CHOLEST SERPL-MCNC: 135 MG/DL (ref 0–200)
CO2 SERPL-SCNC: 26.7 MMOL/L (ref 22–29)
COLOR UR: YELLOW
CREAT SERPL-MCNC: 0.66 MG/DL (ref 0.57–1)
EOSINOPHIL # BLD AUTO: 0.09 10*3/MM3 (ref 0–0.4)
EOSINOPHIL NFR BLD AUTO: 2.6 % (ref 0.3–6.2)
EPI CELLS #/AREA URNS HPF: NORMAL /HPF (ref 0–10)
ERYTHROCYTE [DISTWIDTH] IN BLOOD BY AUTOMATED COUNT: 12.6 % (ref 12.3–15.4)
GLOBULIN SER CALC-MCNC: 2.3 GM/DL
GLUCOSE SERPL-MCNC: 83 MG/DL (ref 65–99)
GLUCOSE UR QL: NEGATIVE
HCT VFR BLD AUTO: 38.2 % (ref 34–46.6)
HCV AB S/CO SERPL IA: <0.1 S/CO RATIO (ref 0–0.9)
HDLC SERPL-MCNC: 42 MG/DL (ref 40–60)
HGB BLD-MCNC: 12.9 G/DL (ref 12–15.9)
HGB UR QL STRIP: NEGATIVE
IMM GRANULOCYTES # BLD AUTO: 0.01 10*3/MM3 (ref 0–0.05)
IMM GRANULOCYTES NFR BLD AUTO: 0.3 % (ref 0–0.5)
KETONES UR QL STRIP: NEGATIVE
LDLC SERPL CALC-MCNC: 75 MG/DL (ref 0–100)
LEUKOCYTE ESTERASE UR QL STRIP: NEGATIVE
LYMPHOCYTES # BLD AUTO: 1.64 10*3/MM3 (ref 0.7–3.1)
LYMPHOCYTES NFR BLD AUTO: 46.6 % (ref 19.6–45.3)
MCH RBC QN AUTO: 30.3 PG (ref 26.6–33)
MCHC RBC AUTO-ENTMCNC: 33.8 G/DL (ref 31.5–35.7)
MCV RBC AUTO: 89.7 FL (ref 79–97)
MICRO URNS: NORMAL
MICRO URNS: NORMAL
MONOCYTES # BLD AUTO: 0.37 10*3/MM3 (ref 0.1–0.9)
MONOCYTES NFR BLD AUTO: 10.5 % (ref 5–12)
MUCOUS THREADS URNS QL MICRO: PRESENT /HPF
NEUTROPHILS # BLD AUTO: 1.38 10*3/MM3 (ref 1.7–7)
NEUTROPHILS NFR BLD AUTO: 39.1 % (ref 42.7–76)
NITRITE UR QL STRIP: NEGATIVE
NRBC BLD AUTO-RTO: 0 /100 WBC (ref 0–0.2)
PH UR STRIP: 6 [PH] (ref 5–7.5)
PLATELET # BLD AUTO: 232 10*3/MM3 (ref 140–450)
POTASSIUM SERPL-SCNC: 4.2 MMOL/L (ref 3.5–5.2)
PROT SERPL-MCNC: 6.8 G/DL (ref 6–8.5)
PROT UR QL STRIP: NEGATIVE
RBC # BLD AUTO: 4.26 10*6/MM3 (ref 3.77–5.28)
RBC #/AREA URNS HPF: NORMAL /HPF (ref 0–2)
SODIUM SERPL-SCNC: 138 MMOL/L (ref 136–145)
SP GR UR: 1.03 (ref 1–1.03)
TRIGL SERPL-MCNC: 98 MG/DL (ref 0–150)
TSH SERPL DL<=0.005 MIU/L-ACNC: 2.47 UIU/ML (ref 0.27–4.2)
URINALYSIS REFLEX: NORMAL
UROBILINOGEN UR STRIP-MCNC: 0.2 MG/DL (ref 0.2–1)
VLDLC SERPL CALC-MCNC: 18 MG/DL (ref 5–40)
WBC # BLD AUTO: 3.52 10*3/MM3 (ref 3.4–10.8)
WBC #/AREA URNS HPF: NORMAL /HPF (ref 0–5)

## 2021-03-11 ENCOUNTER — OFFICE VISIT (OUTPATIENT)
Dept: INTERNAL MEDICINE | Facility: CLINIC | Age: 36
End: 2021-03-11

## 2021-03-11 VITALS
BODY MASS INDEX: 26.16 KG/M2 | WEIGHT: 157 LBS | RESPIRATION RATE: 12 BRPM | SYSTOLIC BLOOD PRESSURE: 128 MMHG | HEART RATE: 77 BPM | TEMPERATURE: 97.5 F | DIASTOLIC BLOOD PRESSURE: 60 MMHG | OXYGEN SATURATION: 98 % | HEIGHT: 65 IN

## 2021-03-11 DIAGNOSIS — L70.0 ACNE VULGARIS: ICD-10-CM

## 2021-03-11 DIAGNOSIS — F41.1 GAD (GENERALIZED ANXIETY DISORDER): ICD-10-CM

## 2021-03-11 DIAGNOSIS — F52.31 ANORGASMIA OF FEMALE: ICD-10-CM

## 2021-03-11 DIAGNOSIS — G43.109 MIGRAINE WITH AURA AND WITHOUT STATUS MIGRAINOSUS, NOT INTRACTABLE: ICD-10-CM

## 2021-03-11 DIAGNOSIS — K58.1 IRRITABLE BOWEL SYNDROME WITH CONSTIPATION: ICD-10-CM

## 2021-03-11 DIAGNOSIS — Z00.00 HEALTHCARE MAINTENANCE: Primary | ICD-10-CM

## 2021-03-11 DIAGNOSIS — N92.0 MENORRHAGIA WITH REGULAR CYCLE: ICD-10-CM

## 2021-03-11 DIAGNOSIS — G25.81 RLS (RESTLESS LEGS SYNDROME): ICD-10-CM

## 2021-03-11 DIAGNOSIS — Z91.09 ENVIRONMENTAL ALLERGIES: ICD-10-CM

## 2021-03-11 DIAGNOSIS — E55.9 AVITAMINOSIS D: ICD-10-CM

## 2021-03-11 PROCEDURE — 99395 PREV VISIT EST AGE 18-39: CPT | Performed by: INTERNAL MEDICINE

## 2021-03-11 RX ORDER — MONTELUKAST SODIUM 10 MG/1
TABLET ORAL
COMMUNITY
Start: 2021-03-02

## 2021-03-11 NOTE — PROGRESS NOTES
Annual Exam (review of medical issues )      [facility]  Carito Ritter is a 35 y.o. female RTC in yearly CPE, review of medical issues:   1. Anxiety/ DAPHNE, recurrence of anxiety in 2016 with new job, with prior diagnosis in past (with primary sx of SOA with (-) CT scan chest and PFT's and resolution with anxiety tx) - was overall controlled on venlafaxine and buspar but weaned off effexor after starting working with sex therapist.  Has weaned off med and notes anxiety is 'OK'.  Still seeing therapist weekly.  Started running for exercise, but stopped due to leg pain. Is still on buspar.   2. R lower leg pain after running - new issue with running.  Is working with PT and feels like getting better, but off running right now.  Going to [facility]. Working on gait retraining as part of this.  Goal is start running again.  Still doing exercise daily with walking and floor exercises for strength.   3. Migraine HA - managed per HA clinic at [place], on Botox.        4. Menorrhagia - had issues in past and offered ablation. Did Kylea IUD and feels like 'has been awful', but still had 3 of 4 weeks on period in last year in each month. Is ready for ablation and asks if I have any concerns with that.    5. Environmental Allergies - on immunotherapy weekly per allergy, back on Singulair.    8. HM - Pap/ Breast Exam planned next week with Gyn.      Review of Systems   Constitutional: Negative for chills, fever, malaise/fatigue, weight gain and weight loss (a few pounds, intentional).   HENT: Negative for congestion, hearing loss, odynophagia and sore throat.    Eyes: Negative for discharge, double vision, pain and redness.        Last eye exam 3/2020   Cardiovascular: Negative for chest pain, dyspnea on exertion, irregular heartbeat, near-syncope, palpitations and syncope.   Respiratory: Negative for cough and shortness of breath.    Skin: Negative for rash and suspicious lesions.   Musculoskeletal: Negative for joint pain, joint  swelling, muscle cramps, muscle weakness and myalgias (B lower legs, focal; after running).   Gastrointestinal: Negative for change in bowel habit (overall controlled on Linzess.  Asks about options there as is expensive.  OK to try with copay card. ), constipation, diarrhea, dysphagia, heartburn, nausea and vomiting.   Genitourinary: Negative for dysuria, frequency, hematuria, hesitancy and incomplete emptying.   Neurological: Negative for dizziness, headaches and light-headedness.   Psychiatric/Behavioral: Negative for depression. The patient is nervous/anxious. The patient does not have insomnia.    Allergic/Immunologic: Negative for environmental allergies (on immunotherapy) and persistent infections.       Problem List:    Patient Active Problem List   Diagnosis   • DAPHNE (generalized anxiety disorder)   • Avitaminosis D   • Panic attack   • Dermatofibroma of lower leg   • Migraine with aura and without status migrainosus, not intractable   • Irritable bowel syndrome with constipation   • RLS (restless legs syndrome)   • Acne vulgaris   • Environmental allergies   • Anorgasmia of female   • Menorrhagia with regular cycle       Medical History:    Past Medical History:   Diagnosis Date   • Acne vulgaris 3/4/2019    Sees derm annually    • Anxiety    • Environmental allergies 3/4/2019   • Generalized anxiety disorder    • Hx of hordeolum     recurrent in [facility]   • IBS (irritable bowel syndrome)    • Migraine headache    • Vitamin D deficiency         Social History:    Social History     Socioeconomic History   • Marital status:      Spouse name: Not on file   • Number of children: 2   • Years of education: Not on file   • Highest education level: Not on file   Tobacco Use   • Smoking status: Never Smoker   • Smokeless tobacco: Never Used   Vaping Use   • Vaping Use: Never used   Substance and Sexual Activity   • Alcohol use: Yes     Comment: 1-2 nori week   • Drug use: No   • Sexual activity: Yes      Partners: Male     Comment:  only; no hx STD's       Family History:   Family History   Problem Relation Age of Onset   • Asthma Mother    • Breast cancer Mother         s/p mastectomy, stage 0   • Hyperthyroidism Father    • Nephrolithiasis Father    • Diabetes Maternal Grandmother    • Lung cancer Maternal Grandfather    • Breast cancer Paternal Grandmother    • Diabetes Paternal Grandmother    • Hyperthyroidism Paternal Grandmother    • ADD / ADHD Son    • Malig Hyperthermia Neg Hx        Surgical History:   Past Surgical History:   Procedure Laterality Date   • BREAST AUGMENTATION  2007   • BREAST CAPSULOTOMY, IMPLANT REVISION  06/2020   • BREAST SURGERY      cyst aspiration Right breast 2012   • BROW LIFT AND BLEPHAROPLASTY  06/2020   • COLONOSCOPY N/A 1/31/2018    One 7 mm polyp in the transverse colon, NBIH. Path: Hyperplastic polyp.    • EXTERNAL EAR SURGERY      ear pining 2013 cosmetic   • EYE SURGERY      lasik   • TONSILLECTOMY AND ADENOIDECTOMY      2005         Current Outpatient Medications:   •  acetaminophen (TYLENOL) 500 MG tablet, 2 tabs PO Q 8 hours PRN, Disp: 30 tablet, Rfl: 0  •  busPIRone (BUSPAR) 10 MG tablet, Take 1 tablet by mouth twice daily. , Disp: 60 tablet, Rfl: 4  •  clonazePAM (KlonoPIN) 0.5 MG tablet, Take 1 tablet by mouth nightly for anxiety., Disp: 90 tablet, Rfl: 1  •  Diclofenac Potassium 50 MG pack, Take  by mouth As Needed (migraines)., Disp: , Rfl:   •  linaclotide (LINZESS) 72 MCG capsule capsule, Take 1 capsule by mouth Every Morning Before Breakfast., Disp: 90 capsule, Rfl: 3  •  montelukast (SINGULAIR) 10 MG tablet, , Disp: , Rfl:     Vitals:    03/11/21 1500   BP: 128/60   Pulse: 77   Resp: 12   Temp: 97.5 °F (36.4 °C)   SpO2: 98%     Body mass index is 26.13 kg/m².    Physical Exam  Vitals reviewed.   Constitutional:       General: She is not in acute distress.     Appearance: Normal appearance. She is well-developed. She is not ill-appearing or toxic-appearing.    HENT:      Head: Normocephalic and atraumatic.      Right Ear: Hearing, tympanic membrane, ear canal and external ear normal.      Left Ear: Hearing, tympanic membrane, ear canal and external ear normal.      Nose: Nose normal.      Mouth/Throat:      Mouth: No injury or oral lesions.      Dentition: Does not have dentures.      Tongue: No lesions.      Pharynx: Oropharynx is clear. Uvula midline. No pharyngeal swelling, oropharyngeal exudate, posterior oropharyngeal erythema or uvula swelling.   Eyes:      General: Lids are normal. No scleral icterus.        Right eye: No discharge.         Left eye: No discharge.      Extraocular Movements: Extraocular movements intact.      Conjunctiva/sclera: Conjunctivae normal.      Pupils: Pupils are equal, round, and reactive to light.   Neck:      Thyroid: No thyroid mass or thyromegaly.      Vascular: No carotid bruit.      Trachea: Trachea normal.   Cardiovascular:      Rate and Rhythm: Normal rate and regular rhythm.      Pulses:           Radial pulses are 2+ on the right side and 2+ on the left side.        Dorsalis pedis pulses are 2+ on the right side and 2+ on the left side.        Posterior tibial pulses are 2+ on the right side and 2+ on the left side.      Heart sounds: Normal heart sounds, S1 normal and S2 normal. No murmur. No friction rub. No gallop.    Pulmonary:      Effort: Pulmonary effort is normal. No respiratory distress.      Breath sounds: Normal breath sounds. No wheezing, rhonchi or rales.   Abdominal:      General: Bowel sounds are normal. There is no distension.      Palpations: Abdomen is soft. There is no mass.      Tenderness: There is no abdominal tenderness. There is no guarding or rebound.   Musculoskeletal:         General: Normal range of motion.      Cervical back: Full passive range of motion without pain. No rigidity. No muscular tenderness. Normal range of motion.      Right lower leg: No edema.      Left lower leg: No edema.       Right foot: Normal range of motion. No deformity or bunion.      Left foot: Normal range of motion. No deformity or bunion.   Feet:      Right foot:      Skin integrity: No ulcer, blister or skin breakdown.      Left foot:      Skin integrity: No ulcer, blister or skin breakdown.   Lymphadenopathy:      Cervical: No cervical adenopathy.      Upper Body:      Right upper body: No supraclavicular, axillary or pectoral adenopathy.      Left upper body: No supraclavicular, axillary or pectoral adenopathy.      Lower Body: No right inguinal adenopathy. No left inguinal adenopathy.   Skin:     General: Skin is warm and dry.      Findings: No rash.   Neurological:      Mental Status: She is alert and oriented to person, place, and time.      Cranial Nerves: No cranial nerve deficit.      Sensory: No sensory deficit.      Motor: No weakness, tremor, atrophy or abnormal muscle tone.      Gait: Gait normal.      Deep Tendon Reflexes: Reflexes are normal and symmetric.      Reflex Scores:       Patellar reflexes are 2+ on the right side and 2+ on the left side.       Achilles reflexes are 2+ on the right side and 2+ on the left side.  Psychiatric:         Mood and Affect: Mood normal.         Behavior: Behavior normal.         Thought Content: Thought content normal.         Assessment/ Plan  Diagnoses and all orders for this visit:    Healthcare maintenance    DAPHNE (generalized anxiety disorder)    Avitaminosis D    Migraine with aura and without status migrainosus, not intractable    Irritable bowel syndrome with constipation    RLS (restless legs syndrome)  -     Ferritin  -     Iron Profile    Acne vulgaris    Environmental allergies    Anorgasmia of female    Menorrhagia with regular cycle    Other orders  -     montelukast (SINGULAIR) 10 MG tablet  -     linaclotide (LINZESS) 72 MCG capsule capsule; Take 1 capsule by mouth Every Morning Before Breakfast.        Return in about 1 year (around 3/11/2022) for Annual  physical.      Discussion:  Carito Ritter is a 35 y.o. female RTC in yearly CPE, review of medical issues:   1. Anxiety/ DAPHNE, recurrence of anxiety in 2016 with new job, with prior diagnosis in past (with primary sx of SOA with (-) CT scan chest and PFT's and resolution with anxiety tx) -  overall controlled off venlafaxine after taper and on buspar. Pt will plan to discuss buspar taper with therapist and notify me if decides to wean.  I encouraged pt on efforts at anxiety control including her regular exercise. Uses Clonopin as PRN for panic --> nightly after RLS dx.    2. R lower leg pain after running - new issue after started running.  Working with PT at [facility].    3. RLS - long term issue, was managed per neuro on Clonopin nightly, transition of med here.  Sx largely controlled.  Iron borderline in 2019, will add to labs today to trend as off oral iron at this time.   4. Migraine HA - managed per HA clinic at [place], on Botox. PRN Cambia packets abortive strategy.   5. RLQ pain, resolved with start of Linzess for IBS per GI, recurred in early 2019 - s/p GI eval 2/2019 with increase in Linzess dosing, sx controlled.  Recall C-scope negative 1/2018 (1 hyperplastic polyp) and pelvic U/S and CT A/P negative in 2017.  Linzess printed Rx today with copay card provided.   6. Acne, forehead - off Retin-A cream, on OTC retinol cream.  7. Hx of Vitamin D deficiency - replete on labs, off supplement. Monitor.   8. Menorrhagia - had issues in past and offered ablation. Failing IUD. Will see Gyn next week, plans uterine ablation.    9. Environmental Allergies - on immunotherapy weekly per allergy, on Singulair.    10. HM - labs d/w pt; Flu - did not get, but plans to get at Rx; Tdap/  Hep A/ COVID - UTD; Pap/ Breast Exam UTD 12/2019 with Gyn, appt next week;  C-scope 1/2018 (1 hyperplastic polyp); Hep C AB (-) 3/2021; c/w exercise.     RTC one year CPE, F labs prior

## 2021-03-12 LAB
FERRITIN SERPL-MCNC: 60 NG/ML (ref 13–150)
IRON SATN MFR SERPL: 15 % (ref 20–50)
IRON SERPL-MCNC: 51 MCG/DL (ref 37–145)
Lab: NORMAL
TIBC SERPL-MCNC: 348 MCG/DL
UIBC SERPL-MCNC: 297 MCG/DL (ref 112–346)
WRITTEN AUTHORIZATION: NORMAL

## 2021-04-06 DIAGNOSIS — F41.1 GAD (GENERALIZED ANXIETY DISORDER): ICD-10-CM

## 2021-04-06 DIAGNOSIS — G25.81 RLS (RESTLESS LEGS SYNDROME): ICD-10-CM

## 2021-04-06 RX ORDER — CLONAZEPAM 0.5 MG/1
TABLET ORAL
Qty: 90 TABLET | Refills: 0 | Status: SHIPPED | OUTPATIENT
Start: 2021-04-06 | End: 2021-07-06

## 2021-04-06 RX ORDER — BUSPIRONE HYDROCHLORIDE 10 MG/1
TABLET ORAL
Qty: 60 TABLET | Refills: 2 | Status: SHIPPED | OUTPATIENT
Start: 2021-04-06 | End: 2021-07-06

## 2021-05-07 ENCOUNTER — OFFICE VISIT (OUTPATIENT)
Dept: INTERNAL MEDICINE | Facility: CLINIC | Age: 36
End: 2021-05-07

## 2021-05-07 VITALS
DIASTOLIC BLOOD PRESSURE: 70 MMHG | HEIGHT: 65 IN | BODY MASS INDEX: 25.33 KG/M2 | SYSTOLIC BLOOD PRESSURE: 118 MMHG | OXYGEN SATURATION: 98 % | WEIGHT: 152 LBS | TEMPERATURE: 97.3 F | HEART RATE: 105 BPM

## 2021-05-07 DIAGNOSIS — F41.1 GAD (GENERALIZED ANXIETY DISORDER): Primary | ICD-10-CM

## 2021-05-07 DIAGNOSIS — F52.31 ANORGASMIA OF FEMALE: ICD-10-CM

## 2021-05-07 PROCEDURE — 99213 OFFICE O/P EST LOW 20 MIN: CPT | Performed by: INTERNAL MEDICINE

## 2021-05-07 NOTE — PROGRESS NOTES
"neuro pysch      HPI  Carito Ritter is a 36 y.o. female RTC in f/u:  \"I have been better\". Notes is 'a wreck' over neuropsych report.  Sees two therapists, one is sex therapist (Dr. Yu) and other regular therapist (Dr. López).  Sex therapist noted she had been to a conference and called pt and said thought could be ADD and might contribute to orgasm issues.    Got results yesterday and  was there with her and reviewed results with her. \"L brain was above average and R brain a smidge below that\". Some frontal lobe scare were borderline or low average. Felt like was 'so confused'. Felt like the data did not match the conclusions of the neuropsychologist.   Zoom meet yesterday with psychologist and noted 'I completely disagree with these findings'.  Thinks she has ADD and feels like there is underlying ADD dx that is 'blowing up the anxiety'. Wonders if would be willing to try ADD meds 'without official dx'.   Asks if I will call her psychologist.   Pt felt like yesterday was 'answer day' and feels like testing did not give her the answers.     Dr Janay López [phone]    Review of Systems   Psychiatric/Behavioral: Negative for depression (variable over time, has lows at times) and memory loss. The patient is nervous/anxious.        The following portions of the patient's history were reviewed and updated as appropriate: allergies, current medications, past medical history, past social history and problem list.      Current Outpatient Medications:   •  acetaminophen (TYLENOL) 500 MG tablet, 2 tabs PO Q 8 hours PRN, Disp: 30 tablet, Rfl: 0  •  busPIRone (BUSPAR) 10 MG tablet, Take 1 tablet by mouth twice daily. , Disp: 60 tablet, Rfl: 2  •  clonazePAM (KlonoPIN) 0.5 MG tablet, Take 1 tablet by mouth nightly for anxiety., Disp: 90 tablet, Rfl: 0  •  Diclofenac Potassium 50 MG pack, Take  by mouth As Needed (migraines)., Disp: , Rfl:   •  linaclotide (LINZESS) 72 MCG capsule capsule, Take 1 capsule by " "mouth Every Morning Before Breakfast., Disp: 90 capsule, Rfl: 3  •  montelukast (SINGULAIR) 10 MG tablet, , Disp: , Rfl:     Vitals:    05/07/21 1409   BP: 118/70   Pulse: 105   Temp: 97.3 °F (36.3 °C)   SpO2: 98%   Weight: 68.9 kg (152 lb)   Height: 165.1 cm (65\")     Body mass index is 25.29 kg/m².      Physical Exam  Vitals reviewed.   Constitutional:       General: She is not in acute distress.     Appearance: She is well-developed. She is not ill-appearing or toxic-appearing.   HENT:      Head: Normocephalic.   Pulmonary:      Effort: Pulmonary effort is normal. No respiratory distress.   Neurological:      General: No focal deficit present.      Mental Status: She is alert and oriented to person, place, and time.      Gait: Gait normal.   Psychiatric:         Mood and Affect: Mood normal. Affect is tearful (at times).         Behavior: Behavior normal.         Thought Content: Thought content normal.         Cognition and Memory: Cognition and memory normal.         Assessment/ Plan  Diagnoses and all orders for this visit:    DAPHNE (generalized anxiety disorder)    Anorgasmia of female        Return for Next scheduled follow up.      Discussion:  Carito Ritter is a 36 y.o. female with long hx of DAPHNE with panic and some OCD tendencies RTC after recent neuropsych testing completed (at suggestion of her sex therapist) with hypothesis that pt had ADD.  Pt had feedback yesterday with psychology on testing and dx was DAPHNE and persistent depressive disorder with no ADD, though pt psychologist 'totally disagrees'.  This disagreement and confusion has really rocked pt and she is upset and tearful today feeling more isolated in her mental health than even in past.  I reviewed report with pt and offered empathy. I agreed to call and discuss with her therapist, Dr. JACLYN López on Monday. I prefer for second opinion on testing as pt clearly needs to know a confirmation or refute of her dx and need to be sure we are " managing meds appropriately. I reminded pt she had long hx on multiple meds and did not have as complete of sx of relief as desired and only minimal change off meds.  Will d/w Dr. López on Monday and f/u with pt.     RTC as planned.     >20/20 minutes was spent in counseling of the following topics:, test results, impressions, risks/benefits of treatment options

## 2021-05-14 ENCOUNTER — TELEPHONE (OUTPATIENT)
Dept: INTERNAL MEDICINE | Facility: CLINIC | Age: 36
End: 2021-05-14

## 2021-05-14 NOTE — TELEPHONE ENCOUNTER
----- Message from Karl Snyder MD sent at 5/14/2021 12:49 PM EDT -----  I spoke with Dr. López.  Await her formal report of testing and will f/u thereafter on med options.

## 2021-05-28 ENCOUNTER — OFFICE VISIT (OUTPATIENT)
Dept: INTERNAL MEDICINE | Facility: CLINIC | Age: 36
End: 2021-05-28

## 2021-05-28 VITALS
BODY MASS INDEX: 25.66 KG/M2 | HEIGHT: 65 IN | WEIGHT: 154 LBS | HEART RATE: 88 BPM | TEMPERATURE: 97.5 F | OXYGEN SATURATION: 98 % | DIASTOLIC BLOOD PRESSURE: 84 MMHG | SYSTOLIC BLOOD PRESSURE: 112 MMHG

## 2021-05-28 DIAGNOSIS — F90.2 ATTENTION DEFICIT HYPERACTIVITY DISORDER (ADHD), COMBINED TYPE: Primary | ICD-10-CM

## 2021-05-28 PROBLEM — F90.9 ADHD (ATTENTION DEFICIT HYPERACTIVITY DISORDER): Status: ACTIVE | Noted: 2021-05-28

## 2021-05-28 PROCEDURE — 99214 OFFICE O/P EST MOD 30 MIN: CPT | Performed by: INTERNAL MEDICINE

## 2021-05-28 RX ORDER — DEXTROAMPHETAMINE SULFATE 5 MG/1
5 TABLET ORAL 2 TIMES DAILY
Qty: 60 TABLET | Refills: 0 | Status: SHIPPED | OUTPATIENT
Start: 2021-05-28 | End: 2021-06-14

## 2021-05-28 NOTE — PROGRESS NOTES
"ADD      HPI  Carito Ritter is a 36 y.o. female RTC In short interval f/u:   Recent second opinion for ADHD dx with psychology and got dx.  Here today to discuss medications. Is aware of stimulant options as child had ADHD and had 'cheek swab' and told needed to be of Adderall and on Dexadrin.  WOnders if needs swab and willing to get it.   Never been on stimulants for anything.   Feeling well overall.      Review of Systems   Constitutional: Negative for chills and fever.   Cardiovascular: Negative for irregular heartbeat and palpitations.   Neurological: Negative for dizziness and light-headedness.       The following portions of the patient's history were reviewed and updated as appropriate: allergies, current medications, past medical history, past social history and problem list.      Current Outpatient Medications:   •  busPIRone (BUSPAR) 10 MG tablet, Take 1 tablet by mouth twice daily. , Disp: 60 tablet, Rfl: 2  •  clonazePAM (KlonoPIN) 0.5 MG tablet, Take 1 tablet by mouth nightly for anxiety., Disp: 90 tablet, Rfl: 0  •  linaclotide (LINZESS) 72 MCG capsule capsule, Take 1 capsule by mouth Every Morning Before Breakfast., Disp: 90 capsule, Rfl: 3  •  montelukast (SINGULAIR) 10 MG tablet, , Disp: , Rfl:   •  acetaminophen (TYLENOL) 500 MG tablet, 2 tabs PO Q 8 hours PRN, Disp: 30 tablet, Rfl: 0  •  dextroamphetamine (Dexedrine) 5 MG tablet, Take 1 tablet by mouth 2 (two) times a day., Disp: 60 tablet, Rfl: 0  •  Diclofenac Potassium 50 MG pack, Take  by mouth As Needed (migraines)., Disp: , Rfl:     Vitals:    05/28/21 0812   BP: 112/84   Pulse: 88   Temp: 97.5 °F (36.4 °C)   SpO2: 98%   Weight: 69.9 kg (154 lb)   Height: 165.1 cm (65\")     Body mass index is 25.63 kg/m².      Physical Exam  Vitals reviewed.   Constitutional:       General: She is not in acute distress.     Appearance: She is well-developed.   HENT:      Head: Normocephalic.   Cardiovascular:      Rate and Rhythm: Normal rate and " regular rhythm.      Heart sounds: Normal heart sounds.   Pulmonary:      Effort: Pulmonary effort is normal. No respiratory distress.   Neurological:      Mental Status: She is alert and oriented to person, place, and time.      Cranial Nerves: No cranial nerve deficit.      Gait: Gait normal.   Psychiatric:         Mood and Affect: Mood normal.         Behavior: Behavior normal.         Thought Content: Thought content normal.         Assessment/ Plan  Diagnoses and all orders for this visit:    Attention deficit hyperactivity disorder (ADHD), combined type  -     dextroamphetamine (Dexedrine) 5 MG tablet; Take 1 tablet by mouth 2 (two) times a day.        Return in about 4 weeks (around 6/25/2021) for Recheck.      Discussion:  Carito Ritter is a 36 y.o. female RTC in short interval f/u after recent re-eval and second opinion with formal neurospych testing.  I have received and reviewed the neuropsych report from Dr. López, psychology and have spoken to her over the phone as well.  ADHD, combined type dx given, new dx.  Will start stimulant short acting today BID, with some flexibility in dosing.  Reviewed use of med, side effects, and completed consent/ reviewed [facility] today.  Pt has regular psychology f/u and will return here in 4-6 weeks to reassess. Pt knows can communicate through [facility] as well.  Return with home B/P log and cuff while on med.  [employer] genetic testing kit provided today as pt desires this for med accuracy (having seen this done in her son and modified his tx).  Will await results.     RTC 4-6 weeks.

## 2021-06-14 ENCOUNTER — TELEPHONE (OUTPATIENT)
Dept: INTERNAL MEDICINE | Facility: CLINIC | Age: 36
End: 2021-06-14

## 2021-06-14 DIAGNOSIS — F90.2 ATTENTION DEFICIT HYPERACTIVITY DISORDER (ADHD), COMBINED TYPE: Primary | ICD-10-CM

## 2021-06-14 RX ORDER — DEXTROAMPHETAMINE SULFATE 10 MG/1
10 TABLET ORAL 2 TIMES DAILY
Qty: 60 TABLET | Refills: 0 | Status: SHIPPED | OUTPATIENT
Start: 2021-06-14 | End: 2021-07-26 | Stop reason: SDUPTHER

## 2021-06-14 NOTE — TELEPHONE ENCOUNTER
I called in 10mg dosing (this 5mg increase per dose) BID.  Lets see how does on higher dosing. I noted OK for early fill due to vacation.    Will d/w pt [employer] testing at her next appt.

## 2021-06-14 NOTE — TELEPHONE ENCOUNTER
Spoke with pt informed of meds sent to pharmacy and increase 5mg tabs to bid and next appt will do genesight swab

## 2021-06-14 NOTE — TELEPHONE ENCOUNTER
I contacted the patient, the medication she is referring to is the dexedrine 5mg tablets.    Patient has upcoming appointment with provider July 12th.  Was not sure if the provider wants to adjust the dosage based on her message sent regarding she feels the medication tapers off at approximately 3pm, or should she ( patient )continue keeping log and readdress at appointment in July.      In regards to refill requests. Advised patient refill requests can be submitted 72 hours in advance, most refills can be called directly to pharmacy, who will contact provider however, on this particular drug she may want to call office in advance due to type of medication.

## 2021-06-14 NOTE — TELEPHONE ENCOUNTER
----- Message from Carito Ritter sent at 6/14/2021  2:52 PM EDT -----  Regarding: Visit Follow-Up Question  Contact: [phone]  Hello-  Just checking in.  First, I send the Genesight swabs off last Monday, the 7th- so I doubt you have those results yet.  I wanted to touch base before we leave for 2 weeks of vacation.  I've been keeping a log of my medication, how I'm feeling, etc. since I started it on May 29th. It looks like I will run out on June 27th- and I'll still be out of town.  To keep it simple, I can tell a difference when I take the medicine for about 2-3 hours.  I feel more able to focus and seems to have helped some with feeling overwhelmed/anxious. I think it wears off pretty quickly.  Usually, I take the second dose around noon and I think it's all gone by 3pm.     So- questions. What do I do now? How does this process work for refills? Thanks!

## 2021-07-03 DIAGNOSIS — G25.81 RLS (RESTLESS LEGS SYNDROME): ICD-10-CM

## 2021-07-03 DIAGNOSIS — F41.1 GAD (GENERALIZED ANXIETY DISORDER): ICD-10-CM

## 2021-07-06 RX ORDER — BUSPIRONE HYDROCHLORIDE 10 MG/1
TABLET ORAL
Qty: 60 TABLET | Refills: 1 | Status: SHIPPED | OUTPATIENT
Start: 2021-07-06 | End: 2021-09-03

## 2021-07-06 RX ORDER — CLONAZEPAM 0.5 MG/1
TABLET ORAL
Qty: 90 TABLET | Refills: 0 | Status: SHIPPED | OUTPATIENT
Start: 2021-07-06 | End: 2021-10-04

## 2021-07-12 ENCOUNTER — OFFICE VISIT (OUTPATIENT)
Dept: INTERNAL MEDICINE | Facility: CLINIC | Age: 36
End: 2021-07-12

## 2021-07-12 VITALS
BODY MASS INDEX: 23.76 KG/M2 | RESPIRATION RATE: 16 BRPM | WEIGHT: 142.6 LBS | OXYGEN SATURATION: 100 % | HEART RATE: 95 BPM | DIASTOLIC BLOOD PRESSURE: 72 MMHG | TEMPERATURE: 96.9 F | SYSTOLIC BLOOD PRESSURE: 104 MMHG | HEIGHT: 65 IN

## 2021-07-12 DIAGNOSIS — F90.2 ATTENTION DEFICIT HYPERACTIVITY DISORDER (ADHD), COMBINED TYPE: Primary | ICD-10-CM

## 2021-07-12 DIAGNOSIS — F41.1 GAD (GENERALIZED ANXIETY DISORDER): ICD-10-CM

## 2021-07-12 PROCEDURE — 99213 OFFICE O/P EST LOW 20 MIN: CPT | Performed by: INTERNAL MEDICINE

## 2021-07-12 NOTE — PROGRESS NOTES
"ADHD (4 week f/u after reevaluation- see report in media )      HPI  Carito Ritter is a 36 y.o. female RTC In f/u short interval f/u after recent re-eval and second opinion with formal neurospych testing showing ADHD.  Pt notes that started on stimulant and 'first day noticed a difference'. Felt 'calmer' when on med and felt like able to focus on tasks that 'normally drive me crazy'.  'thinks that normally would give me anxiety, like packing... normally I am wreck'.  Felt like 3 hours after dose would feel med wear off.  At 3-4 PM when med wore off it 'was just anxiety' after med wore off.    Did increase to higher dose on vacation and felt like was really 'chill' when doing things that normally made anxious (driving on vacation).  Feels like higher dose lasted longer and was better.  Still sleeping fine.  Higher dose resolved 'crazy anxiousness in afternoon like I did with the lower dose'.  Will take second dose at about 2PM.   No heart palps or racing heart. Does not feel to amped up.  Feels good on med.   Home blood pressure checks were \"fine\".    notes that pt is better overall.   Doing workbook fore ADHD with therapist and pt feels like learning no much at this time.   Therapist is leaving for [facility], so having to move clinicians.      Review of Systems   Cardiovascular: Negative for dyspnea on exertion, irregular heartbeat and palpitations.   Neurological: Negative for dizziness and light-headedness.   Psychiatric/Behavioral: Negative for altered mental status and depression. The patient is nervous/anxious. The patient does not have insomnia.        The following portions of the patient's history were reviewed and updated as appropriate: allergies, current medications, past medical history, past social history and problem list.      Current Outpatient Medications:   •  acetaminophen (TYLENOL) 500 MG tablet, 2 tabs PO Q 8 hours PRN, Disp: 30 tablet, Rfl: 0  •  busPIRone (BUSPAR) 10 MG tablet, Take " "1 tablet by mouth twice daily. , Disp: 60 tablet, Rfl: 1  •  clonazePAM (KlonoPIN) 0.5 MG tablet, Take 1 tablet by mouth nightly for anxiety., Disp: 90 tablet, Rfl: 0  •  dextroamphetamine (Dexedrine) 10 MG tablet, Take 1 tablet by mouth 2 (two) times a day. OK for early fill due to pt vacation in late 6/2021, Disp: 60 tablet, Rfl: 0  •  Diclofenac Potassium 50 MG pack, Take  by mouth As Needed (migraines)., Disp: , Rfl:   •  linaclotide (LINZESS) 72 MCG capsule capsule, Take 1 capsule by mouth Every Morning Before Breakfast., Disp: 90 capsule, Rfl: 3  •  montelukast (SINGULAIR) 10 MG tablet, , Disp: , Rfl:     Vitals:    07/12/21 0844   BP: 104/72   Pulse: 95   Resp: 16   Temp: 96.9 °F (36.1 °C)   TempSrc: Infrared   SpO2: 100%   Weight: 64.7 kg (142 lb 9.6 oz)   Height: 165.1 cm (65\")     Body mass index is 23.73 kg/m².      Physical Exam  Constitutional:       General: She is not in acute distress.     Appearance: Normal appearance. She is normal weight. She is not ill-appearing or toxic-appearing.   HENT:      Head: Normocephalic and atraumatic.   Eyes:      General: No scleral icterus.  Neck:      Vascular: No carotid bruit.   Cardiovascular:      Rate and Rhythm: Normal rate and regular rhythm.      Heart sounds: No murmur heard.   No friction rub. No gallop.    Musculoskeletal:      Cervical back: Neck supple.   Neurological:      General: No focal deficit present.      Mental Status: She is alert.      Cranial Nerves: No cranial nerve deficit.      Gait: Gait normal.   Psychiatric:         Mood and Affect: Mood normal.         Behavior: Behavior normal.         Thought Content: Thought content normal.         Judgment: Judgment normal.         Assessment/ Plan  Diagnoses and all orders for this visit:    Attention deficit hyperactivity disorder (ADHD), combined type    DAPHNE (generalized anxiety disorder)        Return for Next scheduled follow up.      Discussion:  Carito Ritter is a 36 y.o. female " RTC In f/u short interval f/u after recent re-eval and second opinion with formal neurospych testing showing ADHD.  Pt started on dextroamphetamine and has been doing well on uptitration of dosing to 10mg BID.  Pt had some wear off effect at lower doses. No side effects noted and home blood pressure log good.  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   Reviewed "[facility] testing for pt and copy given to her.  Limited gene markers for stimulants, including dextroamphetamine, but will c/w current med with pt noting good effects and tolerance.   DAPHNE - long term issue, much improved with therapy, buspar, and ADHD control. No additional meds at this time.     RTC as planned.     >15/17 minutes was spent in counseling of the following topics:, impressions, treatment options, risks/benefits of treatment options

## 2021-07-26 DIAGNOSIS — F90.2 ATTENTION DEFICIT HYPERACTIVITY DISORDER (ADHD), COMBINED TYPE: ICD-10-CM

## 2021-07-27 RX ORDER — DEXTROAMPHETAMINE SULFATE 10 MG/1
10 TABLET ORAL 2 TIMES DAILY
Qty: 60 TABLET | Refills: 0 | Status: SHIPPED | OUTPATIENT
Start: 2021-07-27 | End: 2021-08-26 | Stop reason: SDUPTHER

## 2021-08-26 DIAGNOSIS — F90.2 ATTENTION DEFICIT HYPERACTIVITY DISORDER (ADHD), COMBINED TYPE: ICD-10-CM

## 2021-08-26 RX ORDER — DEXTROAMPHETAMINE SULFATE 10 MG/1
10 TABLET ORAL 2 TIMES DAILY
Qty: 60 TABLET | Refills: 0 | Status: SHIPPED | OUTPATIENT
Start: 2021-08-26 | End: 2021-09-27 | Stop reason: SDUPTHER

## 2021-09-03 RX ORDER — BUSPIRONE HYDROCHLORIDE 10 MG/1
TABLET ORAL
Qty: 60 TABLET | Refills: 3 | Status: SHIPPED | OUTPATIENT
Start: 2021-09-03 | End: 2022-01-04

## 2021-09-27 DIAGNOSIS — F90.2 ATTENTION DEFICIT HYPERACTIVITY DISORDER (ADHD), COMBINED TYPE: ICD-10-CM

## 2021-09-27 RX ORDER — DEXTROAMPHETAMINE SULFATE 10 MG/1
10 TABLET ORAL 2 TIMES DAILY
Qty: 60 TABLET | Refills: 0 | Status: SHIPPED | OUTPATIENT
Start: 2021-09-27 | End: 2021-10-25

## 2021-10-03 DIAGNOSIS — G25.81 RLS (RESTLESS LEGS SYNDROME): ICD-10-CM

## 2021-10-03 DIAGNOSIS — F41.1 GAD (GENERALIZED ANXIETY DISORDER): ICD-10-CM

## 2021-10-04 RX ORDER — CLONAZEPAM 0.5 MG/1
TABLET ORAL
Qty: 90 TABLET | Refills: 1 | Status: SHIPPED | OUTPATIENT
Start: 2021-10-04 | End: 2022-03-03

## 2021-10-25 ENCOUNTER — OFFICE VISIT (OUTPATIENT)
Dept: INTERNAL MEDICINE | Facility: CLINIC | Age: 36
End: 2021-10-25

## 2021-10-25 VITALS
HEART RATE: 90 BPM | OXYGEN SATURATION: 98 % | TEMPERATURE: 97.1 F | HEIGHT: 65 IN | BODY MASS INDEX: 21.49 KG/M2 | WEIGHT: 129 LBS | DIASTOLIC BLOOD PRESSURE: 86 MMHG | SYSTOLIC BLOOD PRESSURE: 120 MMHG

## 2021-10-25 DIAGNOSIS — F90.2 ATTENTION DEFICIT HYPERACTIVITY DISORDER (ADHD), COMBINED TYPE: ICD-10-CM

## 2021-10-25 DIAGNOSIS — R00.0 TACHYCARDIA: Primary | ICD-10-CM

## 2021-10-25 DIAGNOSIS — R00.2 PALPITATIONS: ICD-10-CM

## 2021-10-25 PROCEDURE — 99214 OFFICE O/P EST MOD 30 MIN: CPT | Performed by: INTERNAL MEDICINE

## 2021-10-25 PROCEDURE — 93000 ELECTROCARDIOGRAM COMPLETE: CPT | Performed by: INTERNAL MEDICINE

## 2021-10-25 RX ORDER — EPINEPHRINE 0.3 MG/.3ML
INJECTION, SOLUTION INTRAMUSCULAR
COMMUNITY
Start: 2021-08-05

## 2021-10-25 RX ORDER — DEXTROAMPHETAMINE SACCHARATE, AMPHETAMINE ASPARTATE, DEXTROAMPHETAMINE SULFATE AND AMPHETAMINE SULFATE 2.5; 2.5; 2.5; 2.5 MG/1; MG/1; MG/1; MG/1
10 TABLET ORAL 2 TIMES DAILY
Qty: 60 TABLET | Refills: 0 | Status: SHIPPED | OUTPATIENT
Start: 2021-10-25 | End: 2021-11-16

## 2021-10-25 NOTE — PROGRESS NOTES
"heart racing      HPI  Carito Ritter is a 36 y.o. female RTC in f/u:  \"My heart rate has become an issue in some ways\". Sx all started back about 6 weeks ago.  When occurs has 'heart pounding'.    Some days will nap with daughter, but notes really cannot nap and fall asleep during day now since no med. When tries to rest midday will find that rest of body will relax but 'my heart will be pounding'.   Apple watch was going on and noting HR in 140-160 range at times.   A few times has had some panic events, but feels like other times there is nothing attached.    Notes that non-panic high HR will last a couple of hours.    Does not recall that did not happen with lower dose of stimulant med.   No dizziness noted during times, but will have feeling like 'cannot talk'.    No sx today.   Has lost weight, but is not aware of loss as much as notes on scale here.  Feels like is more noticeable weight loss recently.    Is eating the same but harder as feels like is not as hungry.       Review of Systems   Constitutional: Positive for weight loss.   Cardiovascular: Positive for palpitations. Negative for dyspnea on exertion, near-syncope and syncope.   Respiratory: Negative for shortness of breath.    Neurological: Negative for dizziness, headaches and light-headedness.   Psychiatric/Behavioral: Negative for altered mental status, depression and hallucinations. The patient has insomnia (wtih naps only) and is nervous/anxious (imrpoved overall, has had some panic attacks).        The following portions of the patient's history were reviewed and updated as appropriate: allergies, current medications, past medical history, past social history and problem list.      Current Outpatient Medications:   •  acetaminophen (TYLENOL) 500 MG tablet, 2 tabs PO Q 8 hours PRN, Disp: 30 tablet, Rfl: 0  •  busPIRone (BUSPAR) 10 MG tablet, Take 1 tablet by mouth twice daily. , Disp: 60 tablet, Rfl: 3  •  clonazePAM (KlonoPIN) 0.5 MG " "tablet, Take 1 tablet by mouth nightly for anxiety., Disp: 90 tablet, Rfl: 1  •  Diclofenac Potassium 50 MG pack, Take  by mouth As Needed (migraines)., Disp: , Rfl:   •  linaclotide (LINZESS) 72 MCG capsule capsule, Take 1 capsule by mouth Every Morning Before Breakfast., Disp: 90 capsule, Rfl: 3  •  montelukast (SINGULAIR) 10 MG tablet, , Disp: , Rfl:   •  amphetamine-dextroamphetamine (Adderall) 10 MG tablet, Take 1 tablet by mouth 2 (Two) Times a Day., Disp: 60 tablet, Rfl: 0  •  Auvi-Q 0.3 MG/0.3ML solution auto-injector injection, INJECT AS NEEDED FOR SEVERE ALLERGIC REACTION INCLUDING ANAPHYLAXIS AS DIRECTED, Disp: , Rfl:     Vitals:    10/25/21 1533   BP: 120/86   Pulse: 90   Temp: 97.1 °F (36.2 °C)   SpO2: 98%   Weight: 58.5 kg (129 lb)   Height: 165.1 cm (65\")     Body mass index is 21.47 kg/m².      Physical Exam  Constitutional:       General: She is not in acute distress.     Appearance: Normal appearance. She is normal weight. She is not ill-appearing or toxic-appearing.   HENT:      Head: Normocephalic and atraumatic.      Mouth/Throat:      Mouth: Mucous membranes are moist.      Pharynx: Oropharynx is clear. No oropharyngeal exudate.   Eyes:      General: No scleral icterus.     Conjunctiva/sclera: Conjunctivae normal.      Pupils: Pupils are equal, round, and reactive to light.   Neck:      Thyroid: No thyroid mass, thyromegaly or thyroid tenderness.      Vascular: No carotid bruit.   Cardiovascular:      Rate and Rhythm: Normal rate and regular rhythm.      Heart sounds: No murmur heard.  No friction rub. No gallop.    Pulmonary:      Effort: Pulmonary effort is normal. No respiratory distress.      Breath sounds: No wheezing, rhonchi or rales.   Musculoskeletal:      Cervical back: Normal range of motion and neck supple. No tenderness.      Right lower leg: No edema.      Left lower leg: No edema.   Lymphadenopathy:      Cervical: No cervical adenopathy.   Neurological:      General: No focal " deficit present.      Mental Status: She is alert.      Cranial Nerves: No cranial nerve deficit.      Gait: Gait normal.   Psychiatric:         Mood and Affect: Mood normal.         Behavior: Behavior normal.         Thought Content: Thought content normal.         ECG 12 Lead    Date/Time: 10/25/2021 4:42 PM  Performed by: Karl Snyder MD  Authorized by: Karl Snyder MD   Comparison: not compared with previous ECG   Previous ECG: no previous ECG available  Rhythm: sinus rhythm  Rate: normal  BPM: 88  Conduction: conduction normal  ST Segments: ST segments normal  T Waves: T waves normal  QRS axis: normal    Clinical impression: normal ECG  Comments: QTC - 440  Indication - tachycardia events, stimulant use              Assessment/ Plan  Diagnoses and all orders for this visit:    Tachycardia  -     CBC & Differential  -     Comprehensive Metabolic Panel  -     TSH  -     Holter Monitor - 24 Hour; Future    Palpitations  -     CBC & Differential  -     Comprehensive Metabolic Panel  -     TSH  -     Holter Monitor - 24 Hour; Future    Attention deficit hyperactivity disorder (ADHD), combined type  -     amphetamine-dextroamphetamine (Adderall) 10 MG tablet; Take 1 tablet by mouth 2 (Two) Times a Day.    Other orders  -     Auvi-Q 0.3 MG/0.3ML solution auto-injector injection; INJECT AS NEEDED FOR SEVERE ALLERGIC REACTION INCLUDING ANAPHYLAXIS AS DIRECTED  -     ECG 12 Lead        Return in about 4 weeks (around 11/22/2021).      Discussion:  Carito Ritter is a 36 y.o. female with recent dx of ADHD after second opinion and formal neurospych testing supported dx andpt  started on dextroamphetamine 5 --> 10mg BID RTC in acute care (new issues to examiner) with intermittent palpitations and inability to fall asleep/ nap during daytime since on higher dose of stimulant. Recall, pt had wear off effect with lower dosing.  Note made today of pt marked weight loss, accelerated rate from prior weight loss  efforts over last 2 years.   I suspect we are over treated here on stimulant dosing, but will proceed with Holter to eval for sinus tachy vs arrythmia.  Will recheck TSH and basic labs today, but thought not contributory. EKG NSR In office.  Will change stimulant to IR Adderall to see if better effect/ side effect, noting Focalin and Ritalin were moderate gene-drug interactions on [employer] testing. RTC in 4 weeks for re-eval.  Will touch base in 1 week via phone after dose change and consider decrease dose if sx persist.

## 2021-10-26 LAB
ALBUMIN SERPL-MCNC: 4.8 G/DL (ref 3.8–4.8)
ALBUMIN/GLOB SERPL: 1.8 {RATIO} (ref 1.2–2.2)
ALP SERPL-CCNC: 49 IU/L (ref 44–121)
ALT SERPL-CCNC: 14 IU/L (ref 0–32)
AST SERPL-CCNC: 16 IU/L (ref 0–40)
BASOPHILS # BLD AUTO: 0 X10E3/UL (ref 0–0.2)
BASOPHILS NFR BLD AUTO: 0 %
BILIRUB SERPL-MCNC: 0.7 MG/DL (ref 0–1.2)
BUN SERPL-MCNC: 21 MG/DL (ref 6–20)
BUN/CREAT SERPL: 34 (ref 9–23)
CALCIUM SERPL-MCNC: 9.6 MG/DL (ref 8.7–10.2)
CHLORIDE SERPL-SCNC: 101 MMOL/L (ref 96–106)
CO2 SERPL-SCNC: 22 MMOL/L (ref 20–29)
CREAT SERPL-MCNC: 0.61 MG/DL (ref 0.57–1)
EOSINOPHIL # BLD AUTO: 0 X10E3/UL (ref 0–0.4)
EOSINOPHIL NFR BLD AUTO: 0 %
ERYTHROCYTE [DISTWIDTH] IN BLOOD BY AUTOMATED COUNT: 12.5 % (ref 11.7–15.4)
GLOBULIN SER CALC-MCNC: 2.6 G/DL (ref 1.5–4.5)
GLUCOSE SERPL-MCNC: 76 MG/DL (ref 65–99)
HCT VFR BLD AUTO: 39.5 % (ref 34–46.6)
HGB BLD-MCNC: 13.1 G/DL (ref 11.1–15.9)
IMM GRANULOCYTES # BLD AUTO: 0 X10E3/UL (ref 0–0.1)
IMM GRANULOCYTES NFR BLD AUTO: 0 %
LYMPHOCYTES # BLD AUTO: 2 X10E3/UL (ref 0.7–3.1)
LYMPHOCYTES NFR BLD AUTO: 18 %
MCH RBC QN AUTO: 30.5 PG (ref 26.6–33)
MCHC RBC AUTO-ENTMCNC: 33.2 G/DL (ref 31.5–35.7)
MCV RBC AUTO: 92 FL (ref 79–97)
MONOCYTES # BLD AUTO: 0.7 X10E3/UL (ref 0.1–0.9)
MONOCYTES NFR BLD AUTO: 6 %
NEUTROPHILS # BLD AUTO: 8.5 X10E3/UL (ref 1.4–7)
NEUTROPHILS NFR BLD AUTO: 76 %
PLATELET # BLD AUTO: 317 X10E3/UL (ref 150–450)
POTASSIUM SERPL-SCNC: 4.6 MMOL/L (ref 3.5–5.2)
PROT SERPL-MCNC: 7.4 G/DL (ref 6–8.5)
RBC # BLD AUTO: 4.29 X10E6/UL (ref 3.77–5.28)
SODIUM SERPL-SCNC: 139 MMOL/L (ref 134–144)
TSH SERPL DL<=0.005 MIU/L-ACNC: 1.49 UIU/ML (ref 0.45–4.5)
WBC # BLD AUTO: 11.3 X10E3/UL (ref 3.4–10.8)

## 2021-11-01 ENCOUNTER — TELEPHONE (OUTPATIENT)
Dept: INTERNAL MEDICINE | Facility: CLINIC | Age: 36
End: 2021-11-01

## 2021-11-01 NOTE — TELEPHONE ENCOUNTER
----- Message from Karl Snyder MD sent at 11/1/2021 12:12 PM EDT -----  Call pt. How is she doing on new stimulant med (Adderall)?  Cardiac sx/ palpitations?

## 2021-11-03 LAB
FERRITIN SERPL-MCNC: 58 NG/ML (ref 15–150)
IRON SATN MFR SERPL: 28 % (ref 15–55)
IRON SERPL-MCNC: 82 UG/DL (ref 27–159)
TIBC SERPL-MCNC: 288 UG/DL (ref 250–450)
UIBC SERPL-MCNC: 206 UG/DL (ref 131–425)

## 2021-11-05 DIAGNOSIS — R79.89 ABNORMAL CBC: Primary | ICD-10-CM

## 2021-11-11 LAB
BASOPHILS # BLD AUTO: 0 X10E3/UL (ref 0–0.2)
BASOPHILS NFR BLD AUTO: 0 %
EOSINOPHIL # BLD AUTO: 0 X10E3/UL (ref 0–0.4)
EOSINOPHIL NFR BLD AUTO: 0 %
ERYTHROCYTE [DISTWIDTH] IN BLOOD BY AUTOMATED COUNT: 12.5 % (ref 11.7–15.4)
HCT VFR BLD AUTO: 41.1 % (ref 34–46.6)
HGB BLD-MCNC: 13.6 G/DL (ref 11.1–15.9)
IMM GRANULOCYTES # BLD AUTO: 0 X10E3/UL (ref 0–0.1)
IMM GRANULOCYTES NFR BLD AUTO: 0 %
LYMPHOCYTES # BLD AUTO: 1.7 X10E3/UL (ref 0.7–3.1)
LYMPHOCYTES NFR BLD AUTO: 18 %
MCH RBC QN AUTO: 30.6 PG (ref 26.6–33)
MCHC RBC AUTO-ENTMCNC: 33.1 G/DL (ref 31.5–35.7)
MCV RBC AUTO: 92 FL (ref 79–97)
MONOCYTES # BLD AUTO: 0.6 X10E3/UL (ref 0.1–0.9)
MONOCYTES NFR BLD AUTO: 6 %
NEUTROPHILS # BLD AUTO: 7.2 X10E3/UL (ref 1.4–7)
NEUTROPHILS NFR BLD AUTO: 76 %
PLATELET # BLD AUTO: 336 X10E3/UL (ref 150–450)
RBC # BLD AUTO: 4.45 X10E6/UL (ref 3.77–5.28)
WBC # BLD AUTO: 9.6 X10E3/UL (ref 3.4–10.8)

## 2021-11-17 ENCOUNTER — TELEPHONE (OUTPATIENT)
Dept: INTERNAL MEDICINE | Facility: CLINIC | Age: 36
End: 2021-11-17

## 2021-11-17 NOTE — TELEPHONE ENCOUNTER
Caller: Carito Ritter    Relationship: Self    Best call back number: [phone] (H)    What is the best time to reach you: ANYTIME    Who are you requesting to speak with (clinical staff, provider,  specific staff member): CLINICAL STAFF    Do you know the name of the person who called:     What was the call regarding: PHARMACY DOES NOT HAVE THE MEDICATION THAT WAS CALLED IN THIS MORNING.     PLEASE CONTACT PATIENT.    Do you require a callback: YES        THANKS

## 2021-11-18 ENCOUNTER — TELEPHONE (OUTPATIENT)
Dept: INTERNAL MEDICINE | Facility: CLINIC | Age: 36
End: 2021-11-18

## 2021-11-18 DIAGNOSIS — F90.2 ATTENTION DEFICIT HYPERACTIVITY DISORDER (ADHD), COMBINED TYPE: ICD-10-CM

## 2021-11-18 RX ORDER — DEXTROAMPHETAMINE SULFATE 5 MG/1
5 TABLET ORAL DAILY
Qty: 60 TABLET | Refills: 0 | Status: SHIPPED | OUTPATIENT
Start: 2021-11-18 | End: 2021-12-21 | Stop reason: SDUPTHER

## 2021-11-18 NOTE — TELEPHONE ENCOUNTER
Regarding: Adderall medication change  ----- Message from Karl Snyder MD sent at 11/18/2021  1:20 PM EST -----       ----- Message from Carito Ritter to Karl Snyder MD sent at 11/17/2021  6:03 PM -----   I'm sorry- the pharmacy said the prescription still isn't showing in their system.  Please let me know I need to do anything. It's the same pharmacy I have always used and this hasn't happened in the past.  Thanks.      ----- Message -----       From:Carito Ritter       Sent:11/17/2021 10:03 AM EST         To:Karl Snyder MD    Subject:Adderall medication change    Thank you      ----- Message -----       From:VISH NAVARRETE       Sent:11/17/2021  9:32 AM EST         To:Carito Ritter    Subject:Adderall medication change    Dr. Snyder has sent in the Dexadrine and I have canceled the adderall at the pharmacy.       ----- Message -----       From:Carito Ritter       Sent:11/16/2021  6:15 PM EST         To:Karl Snyder MD    Subject:Adderall medication change    Ok- I thought you were sending in 5mg of Dexadrine.  The Pharmacy said it's 5mg Adderall? I really don't want to take the Adderall.      ----- Message -----       From:VISH NAVARRETE       Sent:11/16/2021  1:43 PM EST         To:Carito Ritter    Subject:Adderall medication change    Dr. Snyder has sent in the prescription.       ----- Message -----       From:Carito Ritter       Sent:11/16/2021 10:34 AM EST         To:Karl Snyder MD    Subject:Adderall medication change    I'm definitely willing to try that.  Unfortunately, I don't have any of the 5mg remaining.  Can it be called in to the pharmacy?      ----- Message -----       From:VISH NAVARRETE       Sent:11/16/2021  7:28 AM EST         To:Carito Ritter    Subject:Adderall medication change    Dr. Snyder's response:    Noted.  I think we need to take a step back here and reassess the dextroamphetamine 5mg dosing.  I felt pt was  overtreated on 10mg dose with sx noted and weight loss.  Does pt have any leftover of this med where she could start 5mg dosing until seen next week?  I would like to see how pt does on switch back to lower dosing of original stimulant.       ----- Message -----       From:Carito Ritter       Sent:11/15/2021  1:23 PM EST         To:Karl Snyder MD    Subject:Adderall medication change    I think I'm not doing well with this new medication.  I started noticing early on that I couldn't really tell a difference when I took it, vs. noticing a big difference with the Dexadrine.  I feel overstimulated and I can't keep my thoughts from being all over the place.  More concerning is I've had significant depression symptoms.  I've been more down this past week than I can ever remember in the past, and there has not been anything significant to point to as the cause.   I know I'm supposed to come in a week for a follow up, but I wanted to address this as soon as possible.  I don't think I can do another week like this past week.  Please advise on what you think I need to do.  Thank you.

## 2021-11-29 ENCOUNTER — OFFICE VISIT (OUTPATIENT)
Dept: INTERNAL MEDICINE | Facility: CLINIC | Age: 36
End: 2021-11-29

## 2021-11-29 VITALS
DIASTOLIC BLOOD PRESSURE: 60 MMHG | SYSTOLIC BLOOD PRESSURE: 120 MMHG | WEIGHT: 130 LBS | TEMPERATURE: 97.1 F | BODY MASS INDEX: 21.66 KG/M2 | HEART RATE: 113 BPM | OXYGEN SATURATION: 98 % | HEIGHT: 65 IN

## 2021-11-29 DIAGNOSIS — F90.2 ATTENTION DEFICIT HYPERACTIVITY DISORDER (ADHD), COMBINED TYPE: Primary | ICD-10-CM

## 2021-11-29 DIAGNOSIS — F41.1 GAD (GENERALIZED ANXIETY DISORDER): ICD-10-CM

## 2021-11-29 DIAGNOSIS — F41.0 PANIC ATTACK: ICD-10-CM

## 2021-11-29 PROCEDURE — 99213 OFFICE O/P EST LOW 20 MIN: CPT | Performed by: INTERNAL MEDICINE

## 2021-11-29 RX ORDER — SPIRONOLACTONE 50 MG/1
TABLET, FILM COATED ORAL
COMMUNITY
Start: 2021-11-08

## 2021-11-29 NOTE — PROGRESS NOTES
"Med Management      HPI  Carito Ritter is a 36 y.o. female RTC In f/u:   Notes \"things are much better\". Felt like when started Adderall did 'not realize things were bad until they were really bad\" . Felt like could not tell if taken Adderall and then felt like depression was 'crazy, stuff I have never had\".  Felt like was 'all the time' while on med.  Been off now for 2 weeks now and back on lower dose of dexadrine at 5 mg BID and feels like is helping.  Fast HR has not come back on lower dose.  Feels good on low dose, but still has issues with wear off of med around 4-5 PM.  'It is gone and then kids are home, dinner... all the things that would be crazy anyways\".  Is not sure wants to change anything right now.     Weight has been stable.   Is still on diet program but goal is maintentance and happy to see weight stable today.     Review of Systems   Constitutional: Negative for chills, fever, weight gain and weight loss.   Neurological: Negative for dizziness and light-headedness.   Psychiatric/Behavioral: Negative for altered mental status, depression (resolved off Adderall) and hallucinations. The patient is nervous/anxious (overall controlled).        The following portions of the patient's history were reviewed and updated as appropriate: allergies, current medications, past medical history, past social history and problem list.      Current Outpatient Medications:   •  acetaminophen (TYLENOL) 500 MG tablet, 2 tabs PO Q 8 hours PRN, Disp: 30 tablet, Rfl: 0  •  Auvi-Q 0.3 MG/0.3ML solution auto-injector injection, INJECT AS NEEDED FOR SEVERE ALLERGIC REACTION INCLUDING ANAPHYLAXIS AS DIRECTED, Disp: , Rfl:   •  busPIRone (BUSPAR) 10 MG tablet, Take 1 tablet by mouth twice daily. , Disp: 60 tablet, Rfl: 3  •  clonazePAM (KlonoPIN) 0.5 MG tablet, Take 1 tablet by mouth nightly for anxiety., Disp: 90 tablet, Rfl: 1  •  dextroamphetamine (DEXTROSTAT) 5 MG tablet, Take 1 tablet by mouth Daily., Disp: 60 " "tablet, Rfl: 0  •  Diclofenac Potassium 50 MG pack, Take  by mouth As Needed (migraines)., Disp: , Rfl:   •  linaclotide (LINZESS) 72 MCG capsule capsule, Take 1 capsule by mouth Every Morning Before Breakfast., Disp: 90 capsule, Rfl: 3  •  montelukast (SINGULAIR) 10 MG tablet, , Disp: , Rfl:   •  spironolactone (ALDACTONE) 50 MG tablet, , Disp: , Rfl:     Vitals:    11/29/21 1451   BP: 120/60   Pulse: 113   Temp: 97.1 °F (36.2 °C)   SpO2: 98%   Weight: 59 kg (130 lb)   Height: 165.1 cm (65\")     Body mass index is 21.63 kg/m².      Physical Exam  Constitutional:       General: She is not in acute distress.     Appearance: Normal appearance. She is normal weight. She is not ill-appearing or toxic-appearing.   HENT:      Head: Normocephalic and atraumatic.   Eyes:      General: No scleral icterus.     Pupils: Pupils are equal, round, and reactive to light.   Cardiovascular:      Rate and Rhythm: Normal rate and regular rhythm.      Heart sounds: No murmur heard.  No friction rub. No gallop.    Pulmonary:      Effort: Pulmonary effort is normal. No respiratory distress.   Musculoskeletal:      Cervical back: Normal range of motion and neck supple.      Right lower leg: No edema.      Left lower leg: No edema.   Neurological:      General: No focal deficit present.      Mental Status: She is alert.      Cranial Nerves: No cranial nerve deficit.      Motor: No tremor.      Gait: Gait normal.   Psychiatric:         Mood and Affect: Mood normal.         Behavior: Behavior normal.         Thought Content: Thought content normal.         Judgment: Judgment normal.         Assessment/ Plan  Diagnoses and all orders for this visit:    Attention deficit hyperactivity disorder (ADHD), combined type    DAPHNE (generalized anxiety disorder)    Panic attack    Other orders  -     spironolactone (ALDACTONE) 50 MG tablet        Return for Next scheduled follow up.      Discussion:  Carito Ritter is a 36 y.o. female with ADHD " with Focalin and Ritalin moderate gene-drug interactions on [employer] testing RTC In f/u.  Pt had issues with weight loss and intermittent palpitations and inability to fall asleep/ nap during daytime since on higher dose of stimulant at 10mg dexadrine with wear off effect.  Trialed change to Adderall IR but had depression side effects and poor control of attention deficit on med.  Is now back on lower dose dexadrine and feels like doing well with good control of attention and no side effects; still has wear off effect.  I d/w pt option of XR stimulant and PRN IR for longer days but prefers to hold at current dose now given marked overall control of sx.    Will attempt wean of daytime buspar to 5mg (1/2 tab) and c/w 10mg dosing to see if can achieve better stimulant effect during day.  Will call with update in a few weeks. Consider further wean if does well noting control of anxiety with attention deficit control.     RTC as planned 3/2022.

## 2021-12-09 ENCOUNTER — OFFICE VISIT (OUTPATIENT)
Dept: INTERNAL MEDICINE | Facility: CLINIC | Age: 36
End: 2021-12-09

## 2021-12-09 ENCOUNTER — HOSPITAL ENCOUNTER (OUTPATIENT)
Dept: CT IMAGING | Facility: HOSPITAL | Age: 36
Discharge: HOME | End: 2021-12-09
Admitting: NURSE PRACTITIONER

## 2021-12-09 ENCOUNTER — TELEPHONE (OUTPATIENT)
Dept: INTERNAL MEDICINE | Facility: CLINIC | Age: 36
End: 2021-12-09

## 2021-12-09 VITALS
SYSTOLIC BLOOD PRESSURE: 116 MMHG | BODY MASS INDEX: 21.49 KG/M2 | OXYGEN SATURATION: 97 % | TEMPERATURE: 97.4 F | DIASTOLIC BLOOD PRESSURE: 70 MMHG | HEART RATE: 113 BPM | WEIGHT: 129 LBS | HEIGHT: 65 IN | RESPIRATION RATE: 16 BRPM

## 2021-12-09 DIAGNOSIS — R10.30 LOWER ABDOMINAL PAIN: Primary | ICD-10-CM

## 2021-12-09 DIAGNOSIS — R10.30 LOWER ABDOMINAL PAIN: ICD-10-CM

## 2021-12-09 DIAGNOSIS — R19.7 DIARRHEA, UNSPECIFIED TYPE: ICD-10-CM

## 2021-12-09 DIAGNOSIS — R10.31 RLQ ABDOMINAL PAIN: ICD-10-CM

## 2021-12-09 LAB
HCT VFR BLDA CALC: 39.4 % (ref 38–51)
HGB BLDA-MCNC: 13.5 G/DL (ref 12–17)
MCH, POC: 30.9
MCHC, POC: 34.3
MCV, POC: 90.2
PLATELET # BLD: 292 10*3/MM3
PMV BLD: 8.3 FL
RBC, POC: 4.37
RDW, POC: 44
WBC # BLD: 7.1 10*3/UL

## 2021-12-09 PROCEDURE — 85025 COMPLETE CBC W/AUTO DIFF WBC: CPT | Performed by: NURSE PRACTITIONER

## 2021-12-09 PROCEDURE — 74177 CT ABD & PELVIS W/CONTRAST: CPT

## 2021-12-09 PROCEDURE — 25010000002 IOPAMIDOL 61 % SOLUTION: Performed by: NURSE PRACTITIONER

## 2021-12-09 PROCEDURE — 99214 OFFICE O/P EST MOD 30 MIN: CPT | Performed by: NURSE PRACTITIONER

## 2021-12-09 RX ADMIN — IOPAMIDOL 85 ML: 612 INJECTION, SOLUTION INTRAVENOUS at 12:51

## 2021-12-09 NOTE — NURSING NOTE
"1253 Patient arrived in radiology triage for stat hold and call.    1322 Dr. Park called, CT is \"normal\" he asked me to call ordering provider.    1325 Called Carolyn Marlyn, she said patient may leave. She wants her to stop by their office before she goes home.  Removed IV. Directed patient to Entrance A to exit independently.      Protective goggles and mask in place with all patient interactions today    "

## 2021-12-09 NOTE — PROGRESS NOTES
Subjective   Carito Ritter is a 36 y.o. female.   Chief Complaint   Patient presents with   • Abdominal Pain   • Diarrhea   • Nausea     Vitals:    12/09/21 1053   BP: 116/70   Pulse: 113   Resp: 16   Temp: 97.4 °F (36.3 °C)   SpO2: 97%     No LMP recorded. Patient has had an ablation.    Carito is a 36 year old female patient of Dr Snyder who is here for an acute visit. She c/o lower abdominal pain and loose stool for 8 days.     Abdominal Pain  This is a new problem. The current episode started in the past 7 days. The onset quality is sudden. The problem occurs constantly. The problem has been unchanged. The pain is located in the LLQ and RLQ. The pain is at a severity of 3/10. The pain is mild. The quality of the pain is aching. The abdominal pain does not radiate. Associated symptoms include diarrhea and nausea. Pertinent negatives include no fever, frequency, hematochezia, melena or vomiting. Associated symptoms comments: Low back pain . The pain is aggravated by eating. Treatments tried: pepto  The treatment provided mild relief. she has a history of constipationa and takes Linzess, she has continued to to take it despite loose stools         The following portions of the patient's history were reviewed and updated as appropriate: allergies, current medications, past family history, past medical history, past social history, past surgical history and problem list.  .cbc  Review of Systems   Constitutional: Negative for fever.   HENT: Negative.    Respiratory: Negative.    Cardiovascular: Negative.    Gastrointestinal: Positive for abdominal pain, diarrhea and nausea. Negative for hematochezia, melena and vomiting.   Genitourinary: Negative for frequency.   Musculoskeletal: Negative.    Neurological: Negative.    Hematological: Negative.    Psychiatric/Behavioral: Negative.        Objective   Physical Exam  Vitals and nursing note reviewed.   Constitutional:       General: She is not in acute  distress.     Appearance: Normal appearance. She is well-developed and well-groomed.   HENT:      Head: Normocephalic.   Cardiovascular:      Rate and Rhythm: Tachycardia present.   Pulmonary:      Effort: Pulmonary effort is normal.   Abdominal:      General: Bowel sounds are normal.      Palpations: Abdomen is soft.      Tenderness: There is abdominal tenderness in the right lower quadrant and left lower quadrant.   Neurological:      Mental Status: She is alert.         Assessment/Plan   Diagnoses and all orders for this visit:    1. Lower abdominal pain (Primary)  -     Comprehensive Metabolic Panel  -     Urinalysis With Culture If Indicated -  -     Cancel: CBC & Differential  -     CT Abdomen Pelvis With Contrast; Future  -     POC CBC With / Auto Diff    2. Diarrhea, unspecified type  -     Comprehensive Metabolic Panel  -     Urinalysis With Culture If Indicated -  -     Cancel: CBC & Differential  -     CT Abdomen Pelvis With Contrast; Future    3. RLQ abdominal pain  -     Cancel: CBC & Differential      In office cbc - wbc is normal   Send out UA and CMP and will call with results  STAT CT ordered to be done at [facility], plan pending results  Advised to hold Linzess for now due to loose stools   Declined need for antiemetic - has at home  Merrick diet and push fluids     Addendum- radiology called at 1:27 with prelim ct report- negative, will have patient return to the office to  stool study kit       Do not drive or operate machinery/Do not make important decisions/No Heavy lifting/straining/Walking-Outdoors allowed/Walking-Indoors allowed/Showering allowed/Stairs allowed

## 2021-12-09 NOTE — TELEPHONE ENCOUNTER
----- Message from GORAN Isidro sent at 12/9/2021  2:46 PM EST -----  Please notify CT is normal  Stool studies ordered

## 2021-12-10 LAB
ALBUMIN SERPL-MCNC: 5 G/DL (ref 3.8–4.8)
ALBUMIN/GLOB SERPL: 1.9 {RATIO} (ref 1.2–2.2)
ALP SERPL-CCNC: 53 IU/L (ref 44–121)
ALT SERPL-CCNC: 15 IU/L (ref 0–32)
AST SERPL-CCNC: 16 IU/L (ref 0–40)
BILIRUB SERPL-MCNC: 0.5 MG/DL (ref 0–1.2)
BUN SERPL-MCNC: 20 MG/DL (ref 6–20)
BUN/CREAT SERPL: 31 (ref 9–23)
CALCIUM SERPL-MCNC: 9.6 MG/DL (ref 8.7–10.2)
CHLORIDE SERPL-SCNC: 100 MMOL/L (ref 96–106)
CO2 SERPL-SCNC: 23 MMOL/L (ref 20–29)
CREAT SERPL-MCNC: 0.65 MG/DL (ref 0.57–1)
GLOBULIN SER CALC-MCNC: 2.7 G/DL (ref 1.5–4.5)
GLUCOSE SERPL-MCNC: 80 MG/DL (ref 65–99)
POTASSIUM SERPL-SCNC: 5.1 MMOL/L (ref 3.5–5.2)
PROT SERPL-MCNC: 7.7 G/DL (ref 6–8.5)
SODIUM SERPL-SCNC: 136 MMOL/L (ref 134–144)

## 2021-12-13 LAB
APPEARANCE UR: CLEAR
BACTERIA #/AREA URNS HPF: NORMAL /[HPF]
BACTERIA UR CULT: ABNORMAL
BACTERIA UR CULT: ABNORMAL
BILIRUB UR QL STRIP: NEGATIVE
CASTS URNS QL MICRO: NORMAL /LPF
COLOR UR: YELLOW
EPI CELLS #/AREA URNS HPF: NORMAL /HPF (ref 0–10)
GLUCOSE UR QL: NEGATIVE
HGB UR QL STRIP: NEGATIVE
KETONES UR QL STRIP: NEGATIVE
LEUKOCYTE ESTERASE UR QL STRIP: ABNORMAL
MICRO URNS: ABNORMAL
NITRITE UR QL STRIP: NEGATIVE
OTHER ANTIBIOTIC SUSC ISLT: ABNORMAL
PH UR STRIP: 6 [PH] (ref 5–7.5)
PROT UR QL STRIP: NEGATIVE
RBC #/AREA URNS HPF: NORMAL /HPF (ref 0–2)
SP GR UR: 1.03 (ref 1–1.03)
URINALYSIS REFLEX: ABNORMAL
UROBILINOGEN UR STRIP-MCNC: 1 MG/DL (ref 0.2–1)
WBC #/AREA URNS HPF: NORMAL /HPF (ref 0–5)

## 2021-12-15 ENCOUNTER — TELEPHONE (OUTPATIENT)
Dept: INTERNAL MEDICINE | Facility: CLINIC | Age: 36
End: 2021-12-15

## 2021-12-15 NOTE — PROGRESS NOTES
Please call and notify patient that urine culture showed very small amount of ecoli bacteria, not significant for UTI. Please see how she is doing.

## 2021-12-15 NOTE — TELEPHONE ENCOUNTER
Called patient and notified her of urine culture results.   She said she is still in significant amount of pain. The pain moved from the center to the right side.

## 2021-12-15 NOTE — TELEPHONE ENCOUNTER
----- Message from GORAN Isidro sent at 12/15/2021 10:39 AM EST -----  Please call and notify patient that urine culture showed very small amount of ecoli bacteria, not significant for UTI. Please see how she is doing.

## 2021-12-17 ENCOUNTER — OFFICE VISIT (OUTPATIENT)
Dept: INTERNAL MEDICINE | Facility: CLINIC | Age: 36
End: 2021-12-17

## 2021-12-17 VITALS
BODY MASS INDEX: 20.99 KG/M2 | OXYGEN SATURATION: 98 % | SYSTOLIC BLOOD PRESSURE: 100 MMHG | WEIGHT: 126 LBS | DIASTOLIC BLOOD PRESSURE: 70 MMHG | HEIGHT: 65 IN | HEART RATE: 93 BPM | TEMPERATURE: 96.9 F

## 2021-12-17 DIAGNOSIS — R10.31 RLQ ABDOMINAL PAIN: ICD-10-CM

## 2021-12-17 DIAGNOSIS — R63.4 WEIGHT LOSS: ICD-10-CM

## 2021-12-17 DIAGNOSIS — F41.1 GAD (GENERALIZED ANXIETY DISORDER): ICD-10-CM

## 2021-12-17 DIAGNOSIS — K58.1 IRRITABLE BOWEL SYNDROME WITH CONSTIPATION: ICD-10-CM

## 2021-12-17 DIAGNOSIS — K52.9 CHRONIC DIARRHEA: Primary | ICD-10-CM

## 2021-12-17 LAB
B-HCG UR QL: NEGATIVE
EXPIRATION DATE: 1123
INTERNAL NEGATIVE CONTROL: NORMAL
INTERNAL POSITIVE CONTROL: NORMAL
Lab: NORMAL

## 2021-12-17 PROCEDURE — 99214 OFFICE O/P EST MOD 30 MIN: CPT | Performed by: INTERNAL MEDICINE

## 2021-12-17 PROCEDURE — 81025 URINE PREGNANCY TEST: CPT | Performed by: INTERNAL MEDICINE

## 2021-12-17 NOTE — PROGRESS NOTES
Abdominal Pain (right lower front and back/ diarrhea )      HPI  Carito Ritter is a 36 y.o. female RTC in short interval f/u:   Started with Lower abdominal pain '3 days after I left here'.  Felt like 'period cramps'.  Pain is all in lower abdomen on both sides but over time moved to RLQ.  Started with loose stools' right through me' with every meal.    Pain went into next week and had a massage and when had touch over abdomen had 'stabbing pain'.  Diarrhea to 'complete liquid' persisted up to 6x/ day. ]  Came here for eval.  Had CT scan and 'felt pain move' over to R side while drinking contrast.    Has been increasing water intake.   Stopped Linzess for a few days and 'made pain worse' so restarted.   Will get '2-3 times/ day sharp stabby pain' that feels like pain with childbirth that will occur each day.  Will persist regardless of position.  Will then go away on now.  No diarrhea after sharp pain.   Has persistent feeling of needing to go after has BM but nothing more will come.   No blood in the stool.   No fevers or chills.   Lost some weight, 'unintentionally'.    Used a few ibuprofen and not sure if helps.   Has not completed stool studies. Struggled to get done when has been at home.     Only med change was reducing buspar after last visit.  Has been off med in AM.  Feels fine off med in Am, 'if anything better. Not anything negative'.     Review of Systems   Constitutional: Positive for weight loss (unintentional). Negative for chills and fever.   Hematologic/Lymphatic: Negative for bleeding problem.   Gastrointestinal: Positive for bloating, abdominal pain, change in bowel habit and diarrhea. Negative for constipation, hematochezia, melena, nausea and vomiting.   Genitourinary: Negative for dysuria.       The following portions of the patient's history were reviewed and updated as appropriate: allergies, current medications, past medical history, past social history and problem list.      Current  "Outpatient Medications:   •  acetaminophen (TYLENOL) 500 MG tablet, 2 tabs PO Q 8 hours PRN, Disp: 30 tablet, Rfl: 0  •  Auvi-Q 0.3 MG/0.3ML solution auto-injector injection, INJECT AS NEEDED FOR SEVERE ALLERGIC REACTION INCLUDING ANAPHYLAXIS AS DIRECTED, Disp: , Rfl:   •  busPIRone (BUSPAR) 10 MG tablet, Take 1 tablet by mouth twice daily. , Disp: 60 tablet, Rfl: 3  •  clonazePAM (KlonoPIN) 0.5 MG tablet, Take 1 tablet by mouth nightly for anxiety., Disp: 90 tablet, Rfl: 1  •  dextroamphetamine (DEXTROSTAT) 5 MG tablet, Take 1 tablet by mouth Daily., Disp: 60 tablet, Rfl: 0  •  Diclofenac Potassium 50 MG pack, Take  by mouth As Needed (migraines)., Disp: , Rfl:   •  linaclotide (LINZESS) 72 MCG capsule capsule, Take 1 capsule by mouth Every Morning Before Breakfast., Disp: 90 capsule, Rfl: 3  •  montelukast (SINGULAIR) 10 MG tablet, , Disp: , Rfl:   •  spironolactone (ALDACTONE) 50 MG tablet, , Disp: , Rfl:     Vitals:    12/17/21 1258   BP: 100/70   Pulse: 93   Temp: 96.9 °F (36.1 °C)   SpO2: 98%   Weight: 57.2 kg (126 lb)   Height: 165.1 cm (65\")     Body mass index is 20.97 kg/m².      Physical Exam  Constitutional:       General: She is not in acute distress.     Appearance: Normal appearance. She is normal weight. She is not ill-appearing or toxic-appearing.   HENT:      Head: Normocephalic and atraumatic.      Mouth/Throat:      Mouth: Mucous membranes are moist.      Pharynx: Oropharynx is clear. No oropharyngeal exudate.   Eyes:      General: No scleral icterus.     Pupils: Pupils are equal, round, and reactive to light.   Cardiovascular:      Rate and Rhythm: Normal rate and regular rhythm.      Heart sounds: No murmur heard.  No friction rub. No gallop.    Pulmonary:      Effort: Pulmonary effort is normal. No respiratory distress.      Breath sounds: No wheezing, rhonchi or rales.   Abdominal:      General: Abdomen is flat. Bowel sounds are normal. There is no distension.      Palpations: Abdomen is " soft. There is no mass.      Tenderness: There is abdominal tenderness in the right lower quadrant. There is right CVA tenderness (mild). There is no left CVA tenderness, guarding or rebound.   Musculoskeletal:      Cervical back: Normal range of motion and neck supple. No tenderness.   Lymphadenopathy:      Cervical: No cervical adenopathy.   Neurological:      General: No focal deficit present.      Mental Status: She is alert.      Cranial Nerves: No cranial nerve deficit.      Gait: Gait normal.   Psychiatric:         Mood and Affect: Mood normal.         Behavior: Behavior normal.         Thought Content: Thought content normal.         Assessment/ Plan  Diagnoses and all orders for this visit:    Chronic diarrhea  -     Amylase  -     Lipase  -     C-reactive Protein  -     Sedimentation Rate  -     Fecal Lactoferrin - Stool, Per Rectum  -     Fecal Leukocytes - Stool, Per Rectum  -     Stool Culture (Reference Lab) - Stool, Per Rectum  -     Calprotectin, Fecal - Stool, Per Rectum  -     Clostridium Difficile Toxin, PCR - Stool, Per Rectum    Weight loss  -     Amylase  -     Lipase  -     C-reactive Protein  -     Sedimentation Rate  -     Fecal Lactoferrin - Stool, Per Rectum  -     Fecal Leukocytes - Stool, Per Rectum  -     Stool Culture (Reference Lab) - Stool, Per Rectum  -     Calprotectin, Fecal - Stool, Per Rectum  -     Clostridium Difficile Toxin, PCR - Stool, Per Rectum  -     POC Pregnancy, Urine    Irritable bowel syndrome with constipation    DAPHNE (generalized anxiety disorder)    RLQ abdominal pain  -     Amylase  -     Lipase  -     C-reactive Protein  -     Sedimentation Rate  -     Fecal Lactoferrin - Stool, Per Rectum  -     Fecal Leukocytes - Stool, Per Rectum  -     Stool Culture (Reference Lab) - Stool, Per Rectum  -     Calprotectin, Fecal - Stool, Per Rectum  -     Clostridium Difficile Toxin, PCR - Stool, Per Rectum  -     POC Pregnancy, Urine        Return for Next scheduled follow  up.      Discussion:  Carito Ritter is a 36 y.o. female RTC in short interval f/u (new issue to examiner) after recent eval in office on 12/9/21 for RLQ pain and post meal watery stools. Labs and CT A/P were unrevealing. Pt continues with sx and has now lost weight.  Note made of IBS-C hx on Linzess, but pt did not tolerate trial off med due to increase abdominal pain (though with fewer stools).  Only med change was lowering of buspar in AM only, but hard to blame sx and focal pain on this change.  Concern for active colon process, ? R colon with focal exam pain and prominent diarrhea sx.  Will check additional labs as noted above and urged pt to complete stool studies ASAP.  Upregnancy test today, thought less likely.  Will f/u after above. C/W current Linzess for now, but may be forced to hold while working this up due to weight loss issues.

## 2021-12-18 LAB
AMYLASE SERPL-CCNC: 78 U/L (ref 31–110)
CRP SERPL-MCNC: <1 MG/L (ref 0–10)
ERYTHROCYTE [SEDIMENTATION RATE] IN BLOOD BY WESTERGREN METHOD: 2 MM/HR (ref 0–32)
LIPASE SERPL-CCNC: 40 U/L (ref 14–72)

## 2021-12-21 DIAGNOSIS — F90.2 ATTENTION DEFICIT HYPERACTIVITY DISORDER (ADHD), COMBINED TYPE: ICD-10-CM

## 2021-12-21 RX ORDER — DEXTROAMPHETAMINE SULFATE 5 MG/1
5 TABLET ORAL DAILY
Qty: 60 TABLET | Refills: 0 | Status: SHIPPED | OUTPATIENT
Start: 2021-12-21 | End: 2021-12-23 | Stop reason: SDUPTHER

## 2021-12-30 LAB
BACTERIA SPEC CULT: NORMAL
BACTERIA SPEC CULT: NORMAL
C DIFF TOX GENS STL QL NAA+PROBE: NEGATIVE
CALPROTECTIN STL-MCNT: 80 UG/G (ref 0–120)
CAMPYLOBACTER STL CULT: NORMAL
E COLI SXT STL QL IA: NEGATIVE
LACTOFERRIN STL-MCNC: 3.7 UG/ML(G) (ref 0–7.24)
SALM + SHIG STL CULT: NORMAL
WBC STL QL MICRO: NORMAL
WBC STL QL MICRO: NORMAL

## 2022-01-04 RX ORDER — BUSPIRONE HYDROCHLORIDE 10 MG/1
TABLET ORAL
Qty: 60 TABLET | Refills: 0 | Status: SHIPPED | OUTPATIENT
Start: 2022-01-04 | End: 2022-03-15

## 2022-01-24 DIAGNOSIS — F90.2 ATTENTION DEFICIT HYPERACTIVITY DISORDER (ADHD), COMBINED TYPE: ICD-10-CM

## 2022-01-24 RX ORDER — DEXTROAMPHETAMINE SULFATE 5 MG/1
5 TABLET ORAL 2 TIMES DAILY
Qty: 60 TABLET | Refills: 0 | Status: SHIPPED | OUTPATIENT
Start: 2022-01-24 | End: 2022-02-24 | Stop reason: SDUPTHER

## 2022-02-10 DIAGNOSIS — F41.1 GAD (GENERALIZED ANXIETY DISORDER): ICD-10-CM

## 2022-02-10 DIAGNOSIS — E55.9 AVITAMINOSIS D: ICD-10-CM

## 2022-02-10 DIAGNOSIS — Z00.00 HEALTHCARE MAINTENANCE: Primary | ICD-10-CM

## 2022-02-10 DIAGNOSIS — G25.81 RLS (RESTLESS LEGS SYNDROME): ICD-10-CM

## 2022-02-10 DIAGNOSIS — F90.2 ATTENTION DEFICIT HYPERACTIVITY DISORDER (ADHD), COMBINED TYPE: ICD-10-CM

## 2022-02-11 LAB
25(OH)D3+25(OH)D2 SERPL-MCNC: 53.4 NG/ML (ref 30–100)
ALBUMIN SERPL-MCNC: 4.9 G/DL (ref 3.8–4.8)
ALBUMIN/GLOB SERPL: 1.9 {RATIO} (ref 1.2–2.2)
ALP SERPL-CCNC: 43 IU/L (ref 44–121)
ALT SERPL-CCNC: 9 IU/L (ref 0–32)
APPEARANCE UR: CLEAR
AST SERPL-CCNC: 13 IU/L (ref 0–40)
BACTERIA #/AREA URNS HPF: NORMAL /[HPF]
BASOPHILS # BLD AUTO: 0 X10E3/UL (ref 0–0.2)
BASOPHILS NFR BLD AUTO: 0 %
BILIRUB SERPL-MCNC: 1 MG/DL (ref 0–1.2)
BILIRUB UR QL STRIP: NEGATIVE
BUN SERPL-MCNC: 18 MG/DL (ref 6–20)
BUN/CREAT SERPL: 26 (ref 9–23)
CALCIUM SERPL-MCNC: 9.7 MG/DL (ref 8.7–10.2)
CASTS URNS QL MICRO: NORMAL /LPF
CHLORIDE SERPL-SCNC: 101 MMOL/L (ref 96–106)
CHOLEST SERPL-MCNC: 208 MG/DL (ref 100–199)
CO2 SERPL-SCNC: 22 MMOL/L (ref 20–29)
COLOR UR: YELLOW
CREAT SERPL-MCNC: 0.69 MG/DL (ref 0.57–1)
EOSINOPHIL # BLD AUTO: 0 X10E3/UL (ref 0–0.4)
EOSINOPHIL NFR BLD AUTO: 1 %
EPI CELLS #/AREA URNS HPF: NORMAL /HPF (ref 0–10)
ERYTHROCYTE [DISTWIDTH] IN BLOOD BY AUTOMATED COUNT: 12.9 % (ref 11.7–15.4)
GLOBULIN SER CALC-MCNC: 2.6 G/DL (ref 1.5–4.5)
GLUCOSE SERPL-MCNC: 80 MG/DL (ref 65–99)
GLUCOSE UR QL STRIP: NEGATIVE
HCT VFR BLD AUTO: 42.8 % (ref 34–46.6)
HDLC SERPL-MCNC: 64 MG/DL
HGB BLD-MCNC: 14.1 G/DL (ref 11.1–15.9)
HGB UR QL STRIP: NEGATIVE
IMM GRANULOCYTES # BLD AUTO: 0 X10E3/UL (ref 0–0.1)
IMM GRANULOCYTES NFR BLD AUTO: 0 %
KETONES UR QL STRIP: NEGATIVE
LDLC SERPL CALC-MCNC: 131 MG/DL (ref 0–99)
LEUKOCYTE ESTERASE UR QL STRIP: NEGATIVE
LYMPHOCYTES # BLD AUTO: 1.5 X10E3/UL (ref 0.7–3.1)
LYMPHOCYTES NFR BLD AUTO: 27 %
MCH RBC QN AUTO: 30.5 PG (ref 26.6–33)
MCHC RBC AUTO-ENTMCNC: 32.9 G/DL (ref 31.5–35.7)
MCV RBC AUTO: 93 FL (ref 79–97)
MICRO URNS: NORMAL
MICRO URNS: NORMAL
MONOCYTES # BLD AUTO: 0.5 X10E3/UL (ref 0.1–0.9)
MONOCYTES NFR BLD AUTO: 8 %
NEUTROPHILS # BLD AUTO: 3.5 X10E3/UL (ref 1.4–7)
NEUTROPHILS NFR BLD AUTO: 64 %
NITRITE UR QL STRIP: NEGATIVE
PH UR STRIP: 5.5 [PH] (ref 5–7.5)
PLATELET # BLD AUTO: 340 X10E3/UL (ref 150–450)
POTASSIUM SERPL-SCNC: 4.9 MMOL/L (ref 3.5–5.2)
PROT SERPL-MCNC: 7.5 G/DL (ref 6–8.5)
PROT UR QL STRIP: NEGATIVE
RBC # BLD AUTO: 4.62 X10E6/UL (ref 3.77–5.28)
RBC #/AREA URNS HPF: NORMAL /HPF (ref 0–2)
SODIUM SERPL-SCNC: 139 MMOL/L (ref 134–144)
SP GR UR STRIP: 1.02 (ref 1–1.03)
TRIGL SERPL-MCNC: 70 MG/DL (ref 0–149)
TSH SERPL DL<=0.005 MIU/L-ACNC: 2.67 UIU/ML (ref 0.45–4.5)
URINALYSIS REFLEX: NORMAL
UROBILINOGEN UR STRIP-MCNC: 0.2 MG/DL (ref 0.2–1)
VLDLC SERPL CALC-MCNC: 13 MG/DL (ref 5–40)
WBC # BLD AUTO: 5.5 X10E3/UL (ref 3.4–10.8)
WBC #/AREA URNS HPF: NORMAL /HPF (ref 0–5)

## 2022-02-24 DIAGNOSIS — F90.2 ATTENTION DEFICIT HYPERACTIVITY DISORDER (ADHD), COMBINED TYPE: ICD-10-CM

## 2022-02-24 RX ORDER — DEXTROAMPHETAMINE SULFATE 5 MG/1
5 TABLET ORAL 2 TIMES DAILY
Qty: 60 TABLET | Refills: 0 | Status: SHIPPED | OUTPATIENT
Start: 2022-02-24 | End: 2022-04-15 | Stop reason: SDUPTHER

## 2022-03-03 DIAGNOSIS — F41.1 GAD (GENERALIZED ANXIETY DISORDER): ICD-10-CM

## 2022-03-03 DIAGNOSIS — G25.81 RLS (RESTLESS LEGS SYNDROME): ICD-10-CM

## 2022-03-03 RX ORDER — CLONAZEPAM 0.5 MG/1
TABLET ORAL
Qty: 90 TABLET | Refills: 1 | Status: SHIPPED | OUTPATIENT
Start: 2022-03-03 | End: 2022-08-08

## 2022-03-08 RX ORDER — LINACLOTIDE 72 UG/1
CAPSULE, GELATIN COATED ORAL
Qty: 90 CAPSULE | Refills: 3 | Status: SHIPPED | OUTPATIENT
Start: 2022-03-08 | End: 2023-03-22

## 2022-03-15 ENCOUNTER — OFFICE VISIT (OUTPATIENT)
Dept: INTERNAL MEDICINE | Facility: CLINIC | Age: 37
End: 2022-03-15

## 2022-03-15 VITALS
SYSTOLIC BLOOD PRESSURE: 120 MMHG | DIASTOLIC BLOOD PRESSURE: 80 MMHG | TEMPERATURE: 97.1 F | BODY MASS INDEX: 20.66 KG/M2 | OXYGEN SATURATION: 95 % | RESPIRATION RATE: 12 BRPM | HEART RATE: 93 BPM | WEIGHT: 124 LBS | HEIGHT: 65 IN

## 2022-03-15 DIAGNOSIS — G43.109 MIGRAINE WITH AURA AND WITHOUT STATUS MIGRAINOSUS, NOT INTRACTABLE: ICD-10-CM

## 2022-03-15 DIAGNOSIS — F90.2 ATTENTION DEFICIT HYPERACTIVITY DISORDER (ADHD), COMBINED TYPE: Primary | ICD-10-CM

## 2022-03-15 DIAGNOSIS — G25.81 RLS (RESTLESS LEGS SYNDROME): ICD-10-CM

## 2022-03-15 DIAGNOSIS — F41.0 PANIC ATTACK: ICD-10-CM

## 2022-03-15 DIAGNOSIS — E55.9 AVITAMINOSIS D: ICD-10-CM

## 2022-03-15 DIAGNOSIS — K58.1 IRRITABLE BOWEL SYNDROME WITH CONSTIPATION: ICD-10-CM

## 2022-03-15 DIAGNOSIS — F41.1 GAD (GENERALIZED ANXIETY DISORDER): ICD-10-CM

## 2022-03-15 DIAGNOSIS — Z91.09 ENVIRONMENTAL ALLERGIES: ICD-10-CM

## 2022-03-15 DIAGNOSIS — L70.0 ACNE VULGARIS: ICD-10-CM

## 2022-03-15 PROCEDURE — 99395 PREV VISIT EST AGE 18-39: CPT | Performed by: INTERNAL MEDICINE

## 2022-03-15 RX ORDER — NYSTATIN AND TRIAMCINOLONE ACETONIDE 100000; 1 [USP'U]/G; MG/G
OINTMENT TOPICAL
COMMUNITY
Start: 2022-02-24 | End: 2022-03-15

## 2022-03-15 RX ORDER — DEXTROAMPHETAMINE SULFATE 10 MG/1
10 CAPSULE, EXTENDED RELEASE ORAL DAILY
Qty: 30 CAPSULE | Refills: 0 | Status: SHIPPED | OUTPATIENT
Start: 2022-03-15 | End: 2022-04-12 | Stop reason: SDUPTHER

## 2022-03-15 RX ORDER — TRIFAROTENE 50 UG/G
CREAM TOPICAL
COMMUNITY
Start: 2022-02-24 | End: 2022-08-08

## 2022-03-15 RX ORDER — DOXYCYCLINE 100 MG/1
TABLET ORAL
COMMUNITY
Start: 2022-02-18 | End: 2022-03-15

## 2022-03-15 NOTE — PROGRESS NOTES
"Annual Exam (Review of medical issues )      HPI  Carito Ritter is a 36 y.o. female RTC in yearly CPE, review of medical issues:  1. ADHD with Focalin and Ritalin moderate gene-drug interactions on [employer] testing RTC In f/u - 'it is OK. It is still better with medicine'.  Really lots of stress and activity in last few months, been 'more emtional'.  Feels better early in day when med in system and then 4-5 pm will struggle a bit more.  Wants to try XR version to see if can help resolve come down sx later in day.   Overall anxiety is better since starting ADHD meds.  Feels good off buspar.  \"I have not noticed any changes\".    2.  RLQ pain and post meal watery stools, Eval in 12/9/21. Labs and CT A/P were unrevealing - pt notes that diarrhea as of 1/2022.  Feels like overall better. Q 2 weeks will get 'a twinge' in RLQ and that is all; will last only a few seconds at a time over a few hours. 'Like a pulsing.... like a contraction'.  Not followed by BM.  Still on Linzess.  Day to day bowels are good.  \"I know what to expect every morning\".  No blodo in stool or black stools.   3. Dysplastic lesion on back - s/p excision with Mohs'. Path was 'pre' cancerous.  Margins clear on Moh's.    5. RLS - long term issue, was managed per neuro on Clonopin nightly, transition of med here.  Sx largely controlled.    6. Acne, forehead - off Retin-A cream, on OTC retinol cream. S tarted Akief in 2021.  Is helping. On Spironolactone.   7. Hx of Vitamin D deficiency - off supplement.   8. Environmental Allergies - on immunotherapy weekly per allergy, on Singulair.  Overall controlled.       Review of Systems   Constitutional: Negative for chills, fever, malaise/fatigue, weight gain and weight loss.   HENT: Negative for congestion, hearing loss, odynophagia and sore throat.    Eyes: Negative for discharge, double vision, pain and redness.        Last eye exam ~11/2021; wears glasses  Some scar tissue on eyes noted, monitoring. "      Cardiovascular: Negative for chest pain, dyspnea on exertion, irregular heartbeat, near-syncope, palpitations and syncope.   Respiratory: Negative for cough and shortness of breath.    Endocrine: Negative for polydipsia, polyphagia and polyuria.   Hematologic/Lymphatic: Negative for bleeding problem. Does not bruise/bleed easily.   Skin: Negative for rash and suspicious lesions.   Musculoskeletal: Negative for joint pain, joint swelling, muscle cramps, muscle weakness and myalgias.   Gastrointestinal: Positive for abdominal pain (rare event, RLQ). Negative for bloating, change in bowel habit, constipation, heartburn, nausea and vomiting.   Genitourinary: Negative for dysuria, frequency, hematuria and hesitancy.        Last Pap 4/2021; never had Mammo   Neurological: Negative for dizziness, headaches and light-headedness.   Psychiatric/Behavioral: Negative for depression. The patient does not have insomnia and is not nervous/anxious.         Some stress in last month, lots of effect on mood.      Allergic/Immunologic: Positive for environmental allergies. Negative for persistent infections.       Problem List:    Patient Active Problem List   Diagnosis   • DAPHNE (generalized anxiety disorder)   • Avitaminosis D   • Panic attack   • Dermatofibroma of lower leg   • Migraine with aura and without status migrainosus, not intractable   • Irritable bowel syndrome with constipation   • RLS (restless legs syndrome)   • Acne vulgaris   • Environmental allergies   • Anorgasmia of female   • Menorrhagia with regular cycle   • ADHD (attention deficit hyperactivity disorder)       Medical History:    Past Medical History:   Diagnosis Date   • Acne vulgaris 03/04/2019    Sees derm annually    • Anxiety    • Dysplastic skin lesion     on back, s/p Moh's 2021   • Environmental allergies 03/04/2019   • Generalized anxiety disorder    • Hx of hordeolum     recurrent in L eye   • IBS (irritable bowel syndrome)    • Migraine headache     • Vitamin D deficiency         Social History:    Social History     Socioeconomic History   • Marital status:    • Number of children: 2   Tobacco Use   • Smoking status: Never Smoker   • Smokeless tobacco: Never Used   Vaping Use   • Vaping Use: Never used   Substance and Sexual Activity   • Alcohol use: Yes     Comment: 1-2 nori week   • Drug use: No   • Sexual activity: Yes     Partners: Male     Comment:  only; no hx STD's       Family History:   Family History   Problem Relation Age of Onset   • Asthma Mother    • Breast cancer Mother         s/p mastectomy, stage 0   • Hyperthyroidism Father    • Nephrolithiasis Father    • Diabetes Maternal Grandmother    • Lung cancer Maternal Grandfather    • Breast cancer Paternal Grandmother    • Diabetes Paternal Grandmother    • Hyperthyroidism Paternal Grandmother    • ADD / ADHD Son    • Malig Hyperthermia Neg Hx        Surgical History:   Past Surgical History:   Procedure Laterality Date   • BREAST AUGMENTATION  2007   • BREAST CAPSULOTOMY, IMPLANT REVISION  06/2020   • BREAST SURGERY      cyst aspiration Right breast 2012   • BROW LIFT AND BLEPHAROPLASTY  06/2020   • COLONOSCOPY N/A 01/31/2018    One 7 mm polyp in the transverse colon, NBIH. Path: Hyperplastic polyp.    • EXTERNAL EAR SURGERY      ear pining 2013 cosmetic   • EYE SURGERY      lasik   • MOHS SURGERY  2021    dysplastic lesion on back   • TONSILLECTOMY AND ADENOIDECTOMY      2005         Current Outpatient Medications:   •  Auvi-Q 0.3 MG/0.3ML solution auto-injector injection, INJECT AS NEEDED FOR SEVERE ALLERGIC REACTION INCLUDING ANAPHYLAXIS AS DIRECTED, Disp: , Rfl:   •  clonazePAM (KlonoPIN) 0.5 MG tablet, Take 1 tablet by mouth nightly for anxiety., Disp: 90 tablet, Rfl: 1  •  dextroamphetamine (DEXTROSTAT) 5 MG tablet, Take 1 tablet by mouth 2 (Two) Times a Day., Disp: 60 tablet, Rfl: 0  •  Diclofenac Potassium 50 MG pack, Take  by mouth As Needed (migraines)., Disp: , Rfl:    •  Linzess 72 MCG capsule capsule, TAKE ONE CAPSULE BY MOUTH EVERY MORNING BEFORE BREAKFAST, Disp: 90 capsule, Rfl: 3  •  montelukast (SINGULAIR) 10 MG tablet, , Disp: , Rfl:   •  spironolactone (ALDACTONE) 50 MG tablet, , Disp: , Rfl:   •  Aklief 0.005 % cream, , Disp: , Rfl:   •  dextroamphetamine (DEXEDRINE SPANSULE) 10 MG 24 hr capsule, Take 1 capsule by mouth Daily., Disp: 30 capsule, Rfl: 0    Vitals:    03/15/22 1455   BP: 120/80   Pulse: 93   Resp: 12   Temp: 97.1 °F (36.2 °C)   SpO2: 95%     Body mass index is 20.63 kg/m².    Physical Exam  Vitals reviewed.   Constitutional:       General: She is not in acute distress.     Appearance: Normal appearance. She is well-developed. She is not ill-appearing or toxic-appearing.   HENT:      Head: Normocephalic and atraumatic.      Right Ear: Hearing, tympanic membrane, ear canal and external ear normal.      Left Ear: Hearing, tympanic membrane, ear canal and external ear normal.      Nose: Nose normal.      Mouth/Throat:      Mouth: No injury or oral lesions.      Dentition: Does not have dentures.      Tongue: No lesions.      Pharynx: Oropharynx is clear. Uvula midline. No pharyngeal swelling, oropharyngeal exudate, posterior oropharyngeal erythema or uvula swelling.   Eyes:      General: Lids are normal. No scleral icterus.        Right eye: No discharge.         Left eye: No discharge.      Extraocular Movements: Extraocular movements intact.      Conjunctiva/sclera: Conjunctivae normal.      Pupils: Pupils are equal, round, and reactive to light.   Neck:      Thyroid: No thyroid mass or thyromegaly.      Vascular: No carotid bruit.      Trachea: Trachea normal.   Cardiovascular:      Rate and Rhythm: Normal rate and regular rhythm.      Pulses:           Radial pulses are 2+ on the right side and 2+ on the left side.        Dorsalis pedis pulses are 2+ on the right side and 2+ on the left side.        Posterior tibial pulses are 2+ on the right side and 2+  on the left side.      Heart sounds: Normal heart sounds, S1 normal and S2 normal. No murmur heard.    No friction rub. No gallop.   Pulmonary:      Effort: Pulmonary effort is normal. No respiratory distress.      Breath sounds: Normal breath sounds. No wheezing, rhonchi or rales.   Chest:   Breasts:      Right: No axillary adenopathy or supraclavicular adenopathy.      Left: No axillary adenopathy or supraclavicular adenopathy.       Abdominal:      General: Bowel sounds are normal. There is no distension.      Palpations: Abdomen is soft. There is no mass.      Tenderness: There is no abdominal tenderness. There is no guarding or rebound.   Musculoskeletal:         General: Normal range of motion.      Cervical back: Full passive range of motion without pain. No rigidity. No muscular tenderness. Normal range of motion.      Right lower leg: No edema.      Left lower leg: No edema.      Right foot: Normal range of motion. No deformity or bunion.      Left foot: Normal range of motion. No deformity or bunion.   Feet:      Right foot:      Skin integrity: No ulcer, blister or skin breakdown.      Left foot:      Skin integrity: No ulcer, blister or skin breakdown.   Lymphadenopathy:      Cervical: No cervical adenopathy.      Upper Body:      Right upper body: No supraclavicular, axillary or pectoral adenopathy.      Left upper body: No supraclavicular, axillary or pectoral adenopathy.      Lower Body: No right inguinal adenopathy. No left inguinal adenopathy.   Skin:     General: Skin is warm and dry.      Findings: No rash.          Neurological:      Mental Status: She is alert and oriented to person, place, and time.      Cranial Nerves: No cranial nerve deficit.      Sensory: No sensory deficit.      Motor: No weakness, tremor, atrophy or abnormal muscle tone.      Gait: Gait normal.      Deep Tendon Reflexes: Reflexes are normal and symmetric.      Reflex Scores:       Patellar reflexes are 2+ on the right  side and 2+ on the left side.       Achilles reflexes are 2+ on the right side and 2+ on the left side.  Psychiatric:         Mood and Affect: Mood normal.         Behavior: Behavior normal.         Thought Content: Thought content normal.         Assessment/ Plan  Diagnoses and all orders for this visit:    Attention deficit hyperactivity disorder (ADHD), combined type  -     dextroamphetamine (DEXEDRINE SPANSULE) 10 MG 24 hr capsule; Take 1 capsule by mouth Daily.    DAPHNE (generalized anxiety disorder)    Avitaminosis D    Panic attack    Migraine with aura and without status migrainosus, not intractable    Irritable bowel syndrome with constipation    RLS (restless legs syndrome)    Acne vulgaris    Environmental allergies    Other orders  -     Aklief 0.005 % cream  -     Discontinue: nystatin-triamcinolone (MYCOLOG) 203056-8.1 UNIT/GM-% ointment  -     Discontinue: doxycycline (ADOXA) 100 MG tablet        Return in about 4 weeks (around 4/12/2022) for Recheck.      Discussion:  Carito Ritter is a 36 y.o. female RTC in yearly CPE, review of medical issues:  1. ADHD with Focalin and Ritalin moderate gene-drug interactions on [employer] testing RTC In f/u - doing well on BID dextroamphetamine, but some issues with rapid wear off in afternoon.  Pt ready for XR version trial, new med called to Rx to day for XR 10mg dosing. OK for midday IR booster if needed. Will message if issues; f/u 4 weeks.   2. Anxiety/ DAPHNE, recurrence of anxiety in 2016 with new job, with prior diagnosis in past- improved as have managed issues in #1.  Off prior venlafaxine after taper and now off buspar after taper with mood controlled. No panic events.  C/W exercise.   3. Eval in 12/9/21 for RLQ pain and post meal watery stools. Labs and CT A/P were unrevealing -resolved diarrhea as of 1/2022.  Unclear etiology overall, but hold on additional eval at this time. C/W Linzess.   4. Dysplastic lesion on back - s/p excision with Mohs'.   5.  RLS - long term issue, was managed per neuro on Clonopin nightly, transition of med here.  Sx largely controlled.  Iron borderline in 2019, will trend next labs.   6. Migraine HA - managed per HA clinic at [place], on Botox. PRN Cambia packets abortive strategy.   7. Acne, forehead - off Retin-A cream. Started Akief in 2021 along with Spironolactone, per derm.   8. Hx of Vitamin D deficiency - replete, off supplement.   9. Menorrhagia - failed IUD. S/P uterine ablation.    10. Environmental Allergies - controlled on immunotherapy weekly per allergy, on Singulair.    11. HM - labs d/w pt; Flu - next season; Tdap/ Hep A/ COVID - UTD; Pap/ Breast Exam UTD 4/2021 with Gyn, appt next week; C-scope 1/2018 (1 hyperplastic polyp); Hep C Ab (-) 3/2021; c/w exercise.     RTC 4 weeks

## 2022-04-12 DIAGNOSIS — F90.2 ATTENTION DEFICIT HYPERACTIVITY DISORDER (ADHD), COMBINED TYPE: ICD-10-CM

## 2022-04-12 RX ORDER — DEXTROAMPHETAMINE SULFATE 10 MG/1
10 CAPSULE, EXTENDED RELEASE ORAL DAILY
Qty: 30 CAPSULE | Refills: 0 | Status: SHIPPED | OUTPATIENT
Start: 2022-04-12 | End: 2022-05-13 | Stop reason: SDUPTHER

## 2022-04-15 ENCOUNTER — TELEMEDICINE (OUTPATIENT)
Dept: INTERNAL MEDICINE | Facility: CLINIC | Age: 37
End: 2022-04-15

## 2022-04-15 DIAGNOSIS — F41.1 GAD (GENERALIZED ANXIETY DISORDER): ICD-10-CM

## 2022-04-15 DIAGNOSIS — F90.2 ATTENTION DEFICIT HYPERACTIVITY DISORDER (ADHD), COMBINED TYPE: Primary | ICD-10-CM

## 2022-04-15 PROCEDURE — 99212 OFFICE O/P EST SF 10 MIN: CPT | Performed by: INTERNAL MEDICINE

## 2022-04-15 RX ORDER — DEXTROAMPHETAMINE SULFATE 5 MG/1
5 TABLET ORAL DAILY PRN
Qty: 60 TABLET | Refills: 0
Start: 2022-04-15 | End: 2022-08-08

## 2022-04-15 NOTE — PROGRESS NOTES
No chief complaint on file.      HPI  Carito Ritter is a 37 y.o. female presents in f/u via video link in Epic. This visit is occurring during the COVID 19 pandemic.    You have chosen to receive care through a telehealth visit.  Do you consent to use a video/audio connection for your medical care today? Yes    1. ADHD with Focalin and Ritalin moderate gene-drug interactions on [employer] testing RTC In f/u - was doing well on BID dextroamphetamine, but some issues with rapid wear off in afternoon.  Changed to XR version and feels like 'right now good'.  Initially on change had some heart racing, but resolved after 3-4 days.  Now notes no sx and no longer has less 'drop off' feeling with med. 'Lasts longer in a good way'. Feels like med will last to 6-7pm and crash is less intense. Not had to use iR for booster.  Did use IR one day on vacation when got up really late, ~10:30AM.  Otherwise, no need to use IR drug.   Feels like effect of XR is just as good as prior IR dose.   Just got XR refill.       Review of Systems   Cardiovascular: Positive for palpitations (after initial change to XR dosing, resolved now. ).   Psychiatric/Behavioral: Negative for altered mental status and depression. The patient is not nervous/anxious (controlled).        The following portions of the patient's history were reviewed and updated as appropriate: allergies, current medications, past medical history, past social history and problem list.      Current Outpatient Medications:   •  dextroamphetamine (DEXEDRINE SPANSULE) 10 MG 24 hr capsule, Take 1 capsule by mouth Daily., Disp: 30 capsule, Rfl: 0  •  Aklief 0.005 % cream, , Disp: , Rfl:   •  Auvi-Q 0.3 MG/0.3ML solution auto-injector injection, INJECT AS NEEDED FOR SEVERE ALLERGIC REACTION INCLUDING ANAPHYLAXIS AS DIRECTED, Disp: , Rfl:   •  clonazePAM (KlonoPIN) 0.5 MG tablet, Take 1 tablet by mouth nightly for anxiety., Disp: 90 tablet, Rfl: 1  •  dextroamphetamine  (DEXTROSTAT) 5 MG tablet, Take 1 tablet by mouth 2 (Two) Times a Day., Disp: 60 tablet, Rfl: 0  •  Diclofenac Potassium 50 MG pack, Take  by mouth As Needed (migraines)., Disp: , Rfl:   •  Linzess 72 MCG capsule capsule, TAKE ONE CAPSULE BY MOUTH EVERY MORNING BEFORE BREAKFAST, Disp: 90 capsule, Rfl: 3  •  montelukast (SINGULAIR) 10 MG tablet, , Disp: , Rfl:   •  spironolactone (ALDACTONE) 50 MG tablet, , Disp: , Rfl:     There were no vitals filed for this visit.  There is no height or weight on file to calculate BMI.      Physical Exam  Vitals reviewed.   Constitutional:       General: She is not in acute distress.     Appearance: She is well-developed.   HENT:      Head: Normocephalic.   Pulmonary:      Effort: Pulmonary effort is normal. No respiratory distress.   Neurological:      Mental Status: She is alert and oriented to person, place, and time.   Psychiatric:         Behavior: Behavior normal.         Thought Content: Thought content normal.         Assessment/ Plan  Diagnoses and all orders for this visit:    Attention deficit hyperactivity disorder (ADHD), combined type    DAPHNE (generalized anxiety disorder)        Return for Next scheduled follow up.      Discussion:  Carito Ritter is a 37 y.o. female presents in f/u via video link in Epic.   1. ADHD with Focalin and Ritalin moderate gene-drug interactions on [employer] testing , controlled on BID IR dosing but with some early wear off of med effect. Is not on XR version for last month noting longer acting equivalent efficacy and less intense 'crash' at end of day. Pleased with med, feels like doing well.  No side effects now after some initial fast HR after change. C/W same.  Will leave IR dosing on med list as PRN only, but expect rare use.     RTC as planned.     Total time of visit ~11 minutes.

## 2022-05-13 DIAGNOSIS — F90.2 ATTENTION DEFICIT HYPERACTIVITY DISORDER (ADHD), COMBINED TYPE: ICD-10-CM

## 2022-05-13 RX ORDER — DEXTROAMPHETAMINE SULFATE 10 MG/1
10 CAPSULE, EXTENDED RELEASE ORAL DAILY
Qty: 30 CAPSULE | Refills: 0 | Status: SHIPPED | OUTPATIENT
Start: 2022-05-13 | End: 2022-08-08

## 2022-08-08 ENCOUNTER — OFFICE VISIT (OUTPATIENT)
Dept: INTERNAL MEDICINE | Facility: CLINIC | Age: 37
End: 2022-08-08

## 2022-08-08 VITALS
BODY MASS INDEX: 24.51 KG/M2 | HEIGHT: 65 IN | DIASTOLIC BLOOD PRESSURE: 74 MMHG | OXYGEN SATURATION: 100 % | TEMPERATURE: 98.3 F | WEIGHT: 147.1 LBS | HEART RATE: 99 BPM | SYSTOLIC BLOOD PRESSURE: 120 MMHG

## 2022-08-08 DIAGNOSIS — T50.905A MEDICATION SIDE EFFECT, INITIAL ENCOUNTER: Primary | ICD-10-CM

## 2022-08-08 DIAGNOSIS — G25.81 RLS (RESTLESS LEGS SYNDROME): ICD-10-CM

## 2022-08-08 DIAGNOSIS — G47.00 INSOMNIA, UNSPECIFIED TYPE: ICD-10-CM

## 2022-08-08 PROCEDURE — 99213 OFFICE O/P EST LOW 20 MIN: CPT | Performed by: INTERNAL MEDICINE

## 2022-08-08 RX ORDER — DEXTROAMPHETAMINE SULFATE 15 MG/1
TABLET ORAL
COMMUNITY
Start: 2022-07-12

## 2022-08-08 RX ORDER — DEXTROAMPHETAMINE SULFATE 10 MG/1
TABLET ORAL
COMMUNITY
Start: 2022-06-14

## 2022-08-08 RX ORDER — ROPINIROLE 0.5 MG/1
0.5 TABLET, FILM COATED ORAL NIGHTLY
COMMUNITY

## 2022-08-08 RX ORDER — ALPRAZOLAM 0.5 MG/1
TABLET ORAL
COMMUNITY
Start: 2022-05-13 | End: 2023-03-20

## 2022-08-08 RX ORDER — ALPRAZOLAM 0.5 MG/1
TABLET ORAL
COMMUNITY
Start: 2022-07-12 | End: 2023-03-20

## 2022-08-08 NOTE — PROGRESS NOTES
"Follow-up (ER Follow Up. ) and Insomnia (Patient is concerned about lack of rest. )      HPI  Carito Ritter is a 37 y.o. female RTC In acute care:   Feels like has had some improved sx in some away recently and some things not as good.   Had f/u with Psych today and 'still wanted to see you and make sure was correct'.    Saw psych in 5/2022 and changed off clonazepam and replaced with ropinirole.    By early June was having issues staying asleep, admits there were some stressors at time.  Tried to work on water intake and exercise to see if could make improvements.   Saw psych last month and given doxepin med at 25mg dosing.  Did not feel like helped at all.  \"I was not sleeping and not getting sleepy\". No napping during day.  Has been working with therapist as well.  Started having leg swelling and feet feeling 'like on fire'.  Looked up doxepin as side effect and so stopped taking the med.  Then started having sx earlier in evening.    One week ago noted focal hot, swelling area on R upper leg and cool to touch R foot compared to L.  Went to ED and had US that was negative for clot.  Sx lasted into next day.   Had similar focus of hot, swelling on R arm the next day.  Resolved after one day.    \"It is not pain but uncomfortable enough'.   Saw psych today and 'she thinks it was doxepin that started' sx.  Notes sx 'feel different' from prior RLS sx.   Been off Doxepin since 7/25/22.  Last sx was 8/5/22.    Got new Rx today for sleep med. Not sure of name of new med.       Review of Systems   Cardiovascular: Positive for leg swelling (intermittently wtih feet swelling and feeling like on fire. ).   Psychiatric/Behavioral: The patient has insomnia and is nervous/anxious.        The following portions of the patient's history were reviewed and updated as appropriate: allergies, current medications, past medical history, past social history and problem list.      Current Outpatient Medications:   •  ALPRAZolam " "(XANAX) 0.5 MG tablet, , Disp: , Rfl:   •  Auvi-Q 0.3 MG/0.3ML solution auto-injector injection, INJECT AS NEEDED FOR SEVERE ALLERGIC REACTION INCLUDING ANAPHYLAXIS AS DIRECTED, Disp: , Rfl:   •  dextroamphetamine (DEXTROSTAT) 10 MG tablet, , Disp: , Rfl:   •  Dextroamphetamine Sulfate 15 MG tablet, , Disp: , Rfl:   •  Diclofenac Potassium 50 MG pack, Take  by mouth As Needed (migraines)., Disp: , Rfl:   •  Linzess 72 MCG capsule capsule, TAKE ONE CAPSULE BY MOUTH EVERY MORNING BEFORE BREAKFAST, Disp: 90 capsule, Rfl: 3  •  montelukast (SINGULAIR) 10 MG tablet, , Disp: , Rfl:   •  rOPINIRole (REQUIP) 0.5 MG tablet, Take 0.5 mg by mouth Every Night. Take 1 hour before bedtime., Disp: , Rfl:   •  spironolactone (ALDACTONE) 50 MG tablet, , Disp: , Rfl:   •  ALPRAZolam (XANAX) 0.5 MG tablet, , Disp: , Rfl:     Vitals:    08/08/22 1547   BP: 120/74   BP Location: Right arm   Patient Position: Sitting   Cuff Size: Small Adult   Pulse: 99   Temp: 98.3 °F (36.8 °C)   SpO2: 100%   Weight: 66.7 kg (147 lb 1.6 oz)   Height: 165.1 cm (65\")     Body mass index is 24.48 kg/m².      Physical Exam  Constitutional:       General: She is not in acute distress.     Appearance: Normal appearance. She is normal weight. She is not ill-appearing or toxic-appearing.   HENT:      Head: Normocephalic and atraumatic.      Mouth/Throat:      Mouth: Mucous membranes are moist.      Pharynx: Oropharynx is clear. No oropharyngeal exudate.   Eyes:      General: No scleral icterus.     Conjunctiva/sclera: Conjunctivae normal.      Pupils: Pupils are equal, round, and reactive to light.   Neck:      Vascular: No carotid bruit.   Cardiovascular:      Rate and Rhythm: Normal rate and regular rhythm.      Heart sounds: No murmur heard.    No friction rub. No gallop.   Pulmonary:      Effort: Pulmonary effort is normal. No respiratory distress.      Breath sounds: No wheezing, rhonchi or rales.   Musculoskeletal:      Cervical back: Normal range of " motion and neck supple. No tenderness.      Right lower leg: No swelling. No edema.      Left lower leg: No swelling. No edema.   Lymphadenopathy:      Cervical: No cervical adenopathy.   Skin:     Findings: No erythema or rash.   Neurological:      General: No focal deficit present.      Mental Status: She is alert.      Cranial Nerves: No cranial nerve deficit.      Gait: Gait normal.   Psychiatric:         Mood and Affect: Mood normal.         Behavior: Behavior normal.         Thought Content: Thought content normal.         Assessment/ Plan  Diagnoses and all orders for this visit:    Medication side effect, initial encounter    RLS (restless legs syndrome)  -     CBC & Differential  -     Ferritin  -     TSH  -     Iron Profile  -     Vitamin B12  -     Folate    Insomnia, unspecified type    Other orders  -     ALPRAZolam (XANAX) 0.5 MG tablet  -     ALPRAZolam (XANAX) 0.5 MG tablet  -     Dextroamphetamine Sulfate 15 MG tablet  -     dextroamphetamine (DEXTROSTAT) 10 MG tablet  -     rOPINIRole (REQUIP) 0.5 MG tablet; Take 0.5 mg by mouth Every Night. Take 1 hour before bedtime.        Return for Next scheduled follow up.      Discussion:  Carito Ritter is a 37 y.o. female with hx of atopy RTC actue care (new issue to examiner) with recent change in psych meds with cessation of long term clonazepam and replaced with ropinirole for RLS.  Insomina ensued and started on doxpein.  Started with focal areas of leg swelling and rubor in legs > arms over subsequent month.  Has stopped med now after eval with pscyh in f/u.  No issues today with swelling, exam benign.  Unclear etiology, but presumably med side effect vs. allergy with no recurrence off med.  Will continue to monitor and f/u psych as planned. Pt to call with name of new sleep med from psych. Offered reassurance today. Call if any recurrence.  RLS progression noted. Will assess labs, notably iron panel, to r/o secondary etiology for progression.      Answers for HPI/ROS submitted by the patient on 8/3/2022  Please describe your symptoms.: Restless legs.  My legs have been bad for around 3 weeks.  Hot and throbbing in some places and cold/tingling in others.  I had major issues in my right leg yesterday afternoon.  Went to [facility] last night worried about some sort of clotting issue. They did an ultrasound and ruled that out.  I'm still having the pain even now.  Have you had these symptoms before?: Yes  How long have you been having these symptoms?: Greater than 2 weeks  Please list any medications you are currently taking for this condition.: Ropinirole  What is the primary reason for your visit?: Other

## 2022-08-09 LAB
BASOPHILS # BLD AUTO: 0 X10E3/UL (ref 0–0.2)
BASOPHILS NFR BLD AUTO: 0 %
EOSINOPHIL # BLD AUTO: 0 X10E3/UL (ref 0–0.4)
EOSINOPHIL NFR BLD AUTO: 0 %
ERYTHROCYTE [DISTWIDTH] IN BLOOD BY AUTOMATED COUNT: 12.8 % (ref 11.7–15.4)
FERRITIN SERPL-MCNC: 79 NG/ML (ref 15–150)
FOLATE SERPL-MCNC: 9.6 NG/ML
HCT VFR BLD AUTO: 38.9 % (ref 34–46.6)
HGB BLD-MCNC: 13.3 G/DL (ref 11.1–15.9)
IMM GRANULOCYTES # BLD AUTO: 0 X10E3/UL (ref 0–0.1)
IMM GRANULOCYTES NFR BLD AUTO: 0 %
IRON SATN MFR SERPL: 24 % (ref 15–55)
IRON SERPL-MCNC: 79 UG/DL (ref 27–159)
LYMPHOCYTES # BLD AUTO: 2.3 X10E3/UL (ref 0.7–3.1)
LYMPHOCYTES NFR BLD AUTO: 25 %
MCH RBC QN AUTO: 32 PG (ref 26.6–33)
MCHC RBC AUTO-ENTMCNC: 34.2 G/DL (ref 31.5–35.7)
MCV RBC AUTO: 94 FL (ref 79–97)
MONOCYTES # BLD AUTO: 0.7 X10E3/UL (ref 0.1–0.9)
MONOCYTES NFR BLD AUTO: 7 %
NEUTROPHILS # BLD AUTO: 6 X10E3/UL (ref 1.4–7)
NEUTROPHILS NFR BLD AUTO: 68 %
PLATELET # BLD AUTO: 319 X10E3/UL (ref 150–450)
RBC # BLD AUTO: 4.15 X10E6/UL (ref 3.77–5.28)
TIBC SERPL-MCNC: 336 UG/DL (ref 250–450)
TSH SERPL DL<=0.005 MIU/L-ACNC: 1.85 UIU/ML (ref 0.45–4.5)
UIBC SERPL-MCNC: 257 UG/DL (ref 131–425)
VIT B12 SERPL-MCNC: 434 PG/ML (ref 232–1245)
WBC # BLD AUTO: 9.1 X10E3/UL (ref 3.4–10.8)

## 2023-03-10 DIAGNOSIS — Z00.00 ANNUAL PHYSICAL EXAM: Primary | ICD-10-CM

## 2023-03-16 LAB
ALBUMIN SERPL-MCNC: 4.6 G/DL (ref 3.5–5.2)
ALBUMIN/CREAT UR: 7 MG/G CREAT (ref 0–29)
ALBUMIN/GLOB SERPL: 1.8 G/DL
ALP SERPL-CCNC: 38 U/L (ref 39–117)
ALT SERPL-CCNC: 10 U/L (ref 1–33)
APPEARANCE UR: ABNORMAL
AST SERPL-CCNC: 14 U/L (ref 1–32)
BACTERIA #/AREA URNS HPF: ABNORMAL /HPF
BACTERIA UR CULT: ABNORMAL
BACTERIA UR CULT: ABNORMAL
BASOPHILS # BLD AUTO: 0.04 10*3/MM3 (ref 0–0.2)
BASOPHILS NFR BLD AUTO: 0.8 % (ref 0–1.5)
BILIRUB SERPL-MCNC: 0.4 MG/DL (ref 0–1.2)
BILIRUB UR QL STRIP: NEGATIVE
BUN SERPL-MCNC: 12 MG/DL (ref 6–20)
BUN/CREAT SERPL: 17.6 (ref 7–25)
CALCIUM SERPL-MCNC: 9.9 MG/DL (ref 8.6–10.5)
CASTS URNS QL MICRO: ABNORMAL /LPF
CHLORIDE SERPL-SCNC: 103 MMOL/L (ref 98–107)
CHOLEST SERPL-MCNC: 174 MG/DL (ref 0–200)
CO2 SERPL-SCNC: 27.8 MMOL/L (ref 22–29)
COLOR UR: YELLOW
CREAT SERPL-MCNC: 0.68 MG/DL (ref 0.57–1)
CREAT UR-MCNC: 262.2 MG/DL
CRYSTALS URNS MICRO: ABNORMAL
EGFRCR SERPLBLD CKD-EPI 2021: 114.5 ML/MIN/1.73
EOSINOPHIL # BLD AUTO: 0.04 10*3/MM3 (ref 0–0.4)
EOSINOPHIL NFR BLD AUTO: 0.8 % (ref 0.3–6.2)
EPI CELLS #/AREA URNS HPF: >10 /HPF (ref 0–10)
ERYTHROCYTE [DISTWIDTH] IN BLOOD BY AUTOMATED COUNT: 12.2 % (ref 12.3–15.4)
GLOBULIN SER CALC-MCNC: 2.5 GM/DL
GLUCOSE SERPL-MCNC: 88 MG/DL (ref 65–99)
GLUCOSE UR QL STRIP: NEGATIVE
HBA1C MFR BLD: 4.8 % (ref 4.8–5.6)
HCT VFR BLD AUTO: 40.9 % (ref 34–46.6)
HDLC SERPL-MCNC: 59 MG/DL (ref 40–60)
HGB BLD-MCNC: 13.6 G/DL (ref 12–15.9)
HGB UR QL STRIP: NEGATIVE
IMM GRANULOCYTES # BLD AUTO: 0.02 10*3/MM3 (ref 0–0.05)
IMM GRANULOCYTES NFR BLD AUTO: 0.4 % (ref 0–0.5)
KETONES UR QL STRIP: NEGATIVE
LDLC SERPL CALC-MCNC: 101 MG/DL (ref 0–100)
LEUKOCYTE ESTERASE UR QL STRIP: ABNORMAL
LYMPHOCYTES # BLD AUTO: 1.92 10*3/MM3 (ref 0.7–3.1)
LYMPHOCYTES NFR BLD AUTO: 37.9 % (ref 19.6–45.3)
MCH RBC QN AUTO: 31.6 PG (ref 26.6–33)
MCHC RBC AUTO-ENTMCNC: 33.3 G/DL (ref 31.5–35.7)
MCV RBC AUTO: 94.9 FL (ref 79–97)
MICRO URNS: ABNORMAL
MICROALBUMIN UR-MCNC: 17.7 UG/ML
MONOCYTES # BLD AUTO: 0.51 10*3/MM3 (ref 0.1–0.9)
MONOCYTES NFR BLD AUTO: 10.1 % (ref 5–12)
MUCOUS THREADS URNS QL MICRO: PRESENT /HPF
NEUTROPHILS # BLD AUTO: 2.53 10*3/MM3 (ref 1.7–7)
NEUTROPHILS NFR BLD AUTO: 50 % (ref 42.7–76)
NITRITE UR QL STRIP: POSITIVE
NRBC BLD AUTO-RTO: 0 /100 WBC (ref 0–0.2)
OTHER ANTIBIOTIC SUSC ISLT: ABNORMAL
PH UR STRIP: 6 [PH] (ref 5–7.5)
PLATELET # BLD AUTO: 321 10*3/MM3 (ref 140–450)
POTASSIUM SERPL-SCNC: 4.4 MMOL/L (ref 3.5–5.2)
PROT SERPL-MCNC: 7.1 G/DL (ref 6–8.5)
PROT UR QL STRIP: ABNORMAL
RBC # BLD AUTO: 4.31 10*6/MM3 (ref 3.77–5.28)
RBC #/AREA URNS HPF: ABNORMAL /HPF (ref 0–2)
SODIUM SERPL-SCNC: 140 MMOL/L (ref 136–145)
SP GR UR STRIP: 1.03 (ref 1–1.03)
TRIGL SERPL-MCNC: 72 MG/DL (ref 0–150)
TSH SERPL DL<=0.005 MIU/L-ACNC: 1.65 UIU/ML (ref 0.27–4.2)
UNIDENT CRYS URNS QL MICRO: PRESENT /LPF
URINALYSIS REFLEX: ABNORMAL
UROBILINOGEN UR STRIP-MCNC: 0.2 MG/DL (ref 0.2–1)
VLDLC SERPL CALC-MCNC: 14 MG/DL (ref 5–40)
WBC # BLD AUTO: 5.06 10*3/MM3 (ref 3.4–10.8)
WBC #/AREA URNS HPF: ABNORMAL /HPF (ref 0–5)

## 2023-03-20 ENCOUNTER — OFFICE VISIT (OUTPATIENT)
Dept: INTERNAL MEDICINE | Facility: CLINIC | Age: 38
End: 2023-03-20
Payer: COMMERCIAL

## 2023-03-20 VITALS
TEMPERATURE: 98.6 F | HEART RATE: 91 BPM | DIASTOLIC BLOOD PRESSURE: 62 MMHG | WEIGHT: 138.6 LBS | SYSTOLIC BLOOD PRESSURE: 112 MMHG | BODY MASS INDEX: 23.09 KG/M2 | OXYGEN SATURATION: 100 % | HEIGHT: 65 IN

## 2023-03-20 DIAGNOSIS — F90.2 ATTENTION DEFICIT HYPERACTIVITY DISORDER (ADHD), COMBINED TYPE: ICD-10-CM

## 2023-03-20 DIAGNOSIS — G25.81 RLS (RESTLESS LEGS SYNDROME): ICD-10-CM

## 2023-03-20 DIAGNOSIS — Z00.01 ENCOUNTER FOR GENERAL ADULT MEDICAL EXAMINATION WITH ABNORMAL FINDINGS: Primary | ICD-10-CM

## 2023-03-20 DIAGNOSIS — K58.1 IRRITABLE BOWEL SYNDROME WITH CONSTIPATION: ICD-10-CM

## 2023-03-20 DIAGNOSIS — E55.9 AVITAMINOSIS D: ICD-10-CM

## 2023-03-20 DIAGNOSIS — F41.1 GAD (GENERALIZED ANXIETY DISORDER): ICD-10-CM

## 2023-03-20 DIAGNOSIS — Z91.09 ENVIRONMENTAL ALLERGIES: ICD-10-CM

## 2023-03-20 DIAGNOSIS — F41.0 PANIC ATTACK: ICD-10-CM

## 2023-03-20 DIAGNOSIS — L70.0 ACNE VULGARIS: ICD-10-CM

## 2023-03-20 DIAGNOSIS — F52.31 ANORGASMIA OF FEMALE: ICD-10-CM

## 2023-03-20 DIAGNOSIS — G43.109 MIGRAINE WITH AURA AND WITHOUT STATUS MIGRAINOSUS, NOT INTRACTABLE: ICD-10-CM

## 2023-03-20 DIAGNOSIS — F99 INSOMNIA DUE TO OTHER MENTAL DISORDER: ICD-10-CM

## 2023-03-20 DIAGNOSIS — N92.0 MENORRHAGIA WITH REGULAR CYCLE: ICD-10-CM

## 2023-03-20 DIAGNOSIS — F51.05 INSOMNIA DUE TO OTHER MENTAL DISORDER: ICD-10-CM

## 2023-03-20 RX ORDER — QUETIAPINE FUMARATE 50 MG/1
TABLET, FILM COATED ORAL
COMMUNITY
Start: 2022-11-29

## 2023-03-20 RX ORDER — NITROFURANTOIN 25; 75 MG/1; MG/1
CAPSULE ORAL
COMMUNITY
Start: 2022-11-26 | End: 2023-03-20

## 2023-03-20 RX ORDER — ALPRAZOLAM 1 MG/1
TABLET ORAL
COMMUNITY
Start: 2023-02-27

## 2023-03-20 NOTE — PROGRESS NOTES
"Annual Exam (CPE. Review of Medical Issues)      HPI  Carito Ritter is a 37 y.o. female RTC in yearly CPE, review of medical issues:  1. Insomnia - noted over years stopped clonazepam and replaced with ropinirole for RLS.  \"That had been fine\".  Insomina ensued and started on doxepin thereafter. Tried restoril after that, 'that did not work' even with dose titration.  Changed to Seroquel and 'did not work'.  Doubled dose 'it worked but crazy nightmares'.  Saw psych last month noted that nightmares had changed and was reflective of prior real life event. Changed due to nightmares and started on Ambien but by 'day 3 I was like suicidal... I had never gotten that far'.  Stopped that med as well.  Has not seen any change even on lower doses dexedrine. Now using Xanax to 'finally to get to sleep' but worried about that being long term. Has messaged to psych and waiting on monthly check up coming up. Thinking will trial Quviviq.    Pt notes that when cant get to sleep, it will set her into panic.  Knows it is happening as cant get to sleep. Feels like if  gets to sleep prior to her will start panic event.   2. ADHD with Focalin and Ritalin moderate gene-drug interactions on [employer] testing RTC In f/u - doing well on BID dextroamphetamine.   3. Anxiety/ DAPHNE, recurrence of anxiety in 2016 with new job, with prior diagnosis in past- improved as have managed issues with attention deficit.  Off meds for anxiety.  Psychologist is on maternity leave and not started back. Day to day feels like anxiety is controlled.   4. RLS - long term issue, was managed per neuro on Clonopin nightly in past.  Notes 'it ebbs and flows'.  Is worse on 'particularly anxious day'.   5. Migraine HA - managed per HA clinic at [place], on Botox. PRN Cambia packets abortive strategy.   6. Acne, forehead - off Retin-A cream. Started Akief in 2021 along with Spironolactone, per derm. Is working well.   7. Hx of Vitamin D deficiency - off " supplement.    8. Environmental Allergies - controlled on immunotherapy weekly per allergy.  Stopped Singulair 2 weeks ago due too sleep, did not seem to help much 'yet'.     Review of Systems   Constitutional: Positive for malaise/fatigue ('from not sleeping'). Negative for chills, fever, weight gain and weight loss.   HENT: Negative for congestion, hearing loss, odynophagia and sore throat.         Had prolonged URI 2/2022, took steroids and Abx   Eyes: Negative for discharge, double vision, pain and redness.        Saw optho today, no issues.      Cardiovascular: Negative for chest pain, dyspnea on exertion, irregular heartbeat, leg swelling, near-syncope, palpitations and syncope.   Respiratory: Negative for cough and shortness of breath.    Endocrine: Negative for polydipsia, polyphagia and polyuria.   Hematologic/Lymphatic: Negative for bleeding problem. Does not bruise/bleed easily.   Skin: Negative for poor wound healing (L leg, razor wound, slow to resolve redness at scar), rash and suspicious lesions.   Musculoskeletal: Negative for joint pain, joint swelling, muscle cramps, muscle weakness and myalgias.   Gastrointestinal: Negative for constipation, diarrhea, dysphagia, heartburn, nausea and vomiting.   Genitourinary: Negative for bladder incontinence, dysuria, frequency, hematuria, hesitancy and incomplete emptying.   Neurological: Negative for dizziness, headaches and light-headedness.   Psychiatric/Behavioral: Negative for altered mental status, depression and hallucinations. The patient has insomnia and is nervous/anxious.    Allergic/Immunologic: Positive for environmental allergies. Negative for persistent infections.       Problem List:    Patient Active Problem List   Diagnosis   • DAPHNE (generalized anxiety disorder)   • Avitaminosis D   • Panic attack   • Dermatofibroma of lower leg   • Migraine with aura and without status migrainosus, not intractable   • Irritable bowel syndrome with  constipation   • RLS (restless legs syndrome)   • Acne vulgaris   • Environmental allergies   • Anorgasmia of female   • Menorrhagia with regular cycle   • ADHD (attention deficit hyperactivity disorder)   • Insomnia due to other mental disorder       Medical History:    Past Medical History:   Diagnosis Date   • Acne vulgaris 03/04/2019    Sees derm annually    • ADHD (attention deficit hyperactivity disorder)    • Allergic    • Anxiety    • Dysplastic skin lesion     on back, s/p Moh's 2021   • Environmental allergies 03/04/2019   • Generalized anxiety disorder    • Hx of hordeolum     recurrent in L eye   • IBS (irritable bowel syndrome)    • Migraine headache    • Vitamin D deficiency         Social History:    Social History     Socioeconomic History   • Marital status:    • Number of children: 2   Tobacco Use   • Smoking status: Never   • Smokeless tobacco: Never   Vaping Use   • Vaping Use: Never used   Substance and Sexual Activity   • Alcohol use: Yes     Comment: 1-2 nori week --> 4-5 drinks/ week   • Drug use: No   • Sexual activity: Yes     Partners: Male     Birth control/protection: Surgical     Comment:  only; no hx STD's       Family History:   Family History   Problem Relation Age of Onset   • Asthma Mother    • Breast cancer Mother         s/p mastectomy, stage 0   • Hyperthyroidism Father    • Nephrolithiasis Father    • Diabetes Maternal Grandmother    • Lung cancer Maternal Grandfather    • Breast cancer Paternal Grandmother    • Diabetes Paternal Grandmother    • Hyperthyroidism Paternal Grandmother    • ADD / ADHD Son    • Malig Hyperthermia Neg Hx        Surgical History:   Past Surgical History:   Procedure Laterality Date   • ADENOIDECTOMY     • BREAST AUGMENTATION  2007   • BREAST CAPSULOTOMY, IMPLANT REVISION  06/2020   • BREAST SURGERY      cyst aspiration Right breast 2012   • BROW LIFT AND BLEPHAROPLASTY  06/2020   • COLONOSCOPY N/A 01/31/2018    One 7 mm polyp in the  transverse colon, NBIH. Path: Hyperplastic polyp.    • EXTERNAL EAR SURGERY      ear pining 2013 cosmetic   • EYE SURGERY      lasik   • EYE SURGERY Left 06/2022    PRK revision   • MOHS SURGERY  2021    dysplastic lesion on back   • TONSILLECTOMY AND ADENOIDECTOMY      2005         Current Outpatient Medications:   •  ALPRAZolam (XANAX) 1 MG tablet, , Disp: , Rfl:   •  Auvi-Q 0.3 MG/0.3ML solution auto-injector injection, INJECT AS NEEDED FOR SEVERE ALLERGIC REACTION INCLUDING ANAPHYLAXIS AS DIRECTED, Disp: , Rfl:   •  dextroamphetamine (DEXTROSTAT) 10 MG tablet, , Disp: , Rfl:   •  Dextroamphetamine Sulfate 15 MG tablet, , Disp: , Rfl:   •  Diclofenac Potassium 50 MG pack, Take  by mouth As Needed (migraines)., Disp: , Rfl:   •  Linzess 72 MCG capsule capsule, TAKE ONE CAPSULE BY MOUTH EVERY MORNING BEFORE BREAKFAST, Disp: 90 capsule, Rfl: 3  •  rOPINIRole (REQUIP) 0.5 MG tablet, Take 1 tablet by mouth Every Night. Take 1 hour before bedtime., Disp: , Rfl:   •  spironolactone (ALDACTONE) 50 MG tablet, , Disp: , Rfl:   •  montelukast (SINGULAIR) 10 MG tablet, , Disp: , Rfl:   •  QUEtiapine (SEROquel) 50 MG tablet, , Disp: , Rfl:     Vitals:    03/20/23 1506   BP: 112/62   Pulse: 91   Temp: 98.6 °F (37 °C)   SpO2: 100%     Body mass index is 23.06 kg/m².    Physical Exam  Vitals reviewed.   Constitutional:       General: She is not in acute distress.     Appearance: Normal appearance. She is well-developed. She is not ill-appearing or toxic-appearing.   HENT:      Head: Normocephalic and atraumatic.      Right Ear: Hearing, tympanic membrane, ear canal and external ear normal.      Left Ear: Hearing, tympanic membrane, ear canal and external ear normal.      Nose: Nose normal.      Mouth/Throat:      Mouth: Mucous membranes are moist. No injury or oral lesions.      Dentition: Does not have dentures.      Tongue: No lesions.      Pharynx: Oropharynx is clear. Uvula midline. No pharyngeal swelling, oropharyngeal  exudate, posterior oropharyngeal erythema or uvula swelling.   Eyes:      General: Lids are normal. No scleral icterus.        Right eye: No discharge.         Left eye: No discharge.      Extraocular Movements: Extraocular movements intact.      Conjunctiva/sclera: Conjunctivae normal.      Pupils: Pupils are equal, round, and reactive to light.   Neck:      Thyroid: No thyroid mass or thyromegaly.      Vascular: No carotid bruit.      Trachea: Trachea normal.   Cardiovascular:      Rate and Rhythm: Normal rate and regular rhythm.      Pulses:           Radial pulses are 2+ on the right side and 2+ on the left side.        Dorsalis pedis pulses are 2+ on the right side and 2+ on the left side.        Posterior tibial pulses are 2+ on the right side and 2+ on the left side.      Heart sounds: Normal heart sounds, S1 normal and S2 normal. No murmur heard.    No friction rub. No gallop.   Pulmonary:      Effort: Pulmonary effort is normal. No respiratory distress.      Breath sounds: Normal breath sounds. No wheezing, rhonchi or rales.   Abdominal:      General: Bowel sounds are normal. There is no distension.      Palpations: Abdomen is soft. There is no mass.      Tenderness: There is no abdominal tenderness. There is no guarding or rebound.   Musculoskeletal:         General: Normal range of motion.      Cervical back: Full passive range of motion without pain. No rigidity. No muscular tenderness. Normal range of motion.      Right lower leg: No edema.      Left lower leg: No edema.      Right foot: Normal range of motion. No deformity or bunion.      Left foot: Normal range of motion. No deformity or bunion.   Feet:      Right foot:      Skin integrity: No ulcer, blister or skin breakdown.      Left foot:      Skin integrity: No ulcer, blister or skin breakdown.   Lymphadenopathy:      Cervical: No cervical adenopathy.      Upper Body:      Right upper body: No supraclavicular, axillary or pectoral adenopathy.       Left upper body: No supraclavicular, axillary or pectoral adenopathy.      Lower Body: No right inguinal adenopathy. No left inguinal adenopathy.   Skin:     General: Skin is warm and dry.      Findings: No rash.   Neurological:      Mental Status: She is alert and oriented to person, place, and time.      Cranial Nerves: No cranial nerve deficit.      Sensory: No sensory deficit.      Motor: No weakness, tremor, atrophy or abnormal muscle tone.      Gait: Gait normal.      Deep Tendon Reflexes: Reflexes are normal and symmetric.      Reflex Scores:       Patellar reflexes are 2+ on the right side and 2+ on the left side.       Achilles reflexes are 2+ on the right side and 2+ on the left side.  Psychiatric:         Mood and Affect: Mood normal.         Behavior: Behavior normal.         Thought Content: Thought content normal.         Assessment/ Plan  Diagnoses and all orders for this visit:    Encounter for general adult medical examination with abnormal findings    Insomnia due to other mental disorder  -     Ambulatory Referral to Psychology    DAPHNE (generalized anxiety disorder)    Avitaminosis D    Panic attack    Migraine with aura and without status migrainosus, not intractable    Irritable bowel syndrome with constipation    RLS (restless legs syndrome)    Acne vulgaris    Environmental allergies    Anorgasmia of female    Menorrhagia with regular cycle    Attention deficit hyperactivity disorder (ADHD), combined type    Other orders  -     ALPRAZolam (XANAX) 1 MG tablet  -     Discontinue: nitrofurantoin, macrocrystal-monohydrate, (MACROBID) 100 MG capsule  -     QUEtiapine (SEROquel) 50 MG tablet;  (Patient not taking: Reported on 3/20/2023)        Return in about 1 year (around 3/20/2024) for Annual physical.      Discussion:  Carito Ritter is a 37 y.o. female RTC in yearly CPE, review of medical issues:  1. Insomnia - long term issue, progressive after weaned off  clonazepam by psych.  Now  tried Seroquel, restoril,  and Ambien with little success and noted nightmares with Seroquel.  Considering orexin antagonist, per psych.  D/W pt at length option of sleep psychology and given anxiety response to trouble falling asleep feel that she is good candidate. Referral name to sleep psychology given.   2. ADHD with Focalin and Ritalin moderate gene-drug interactions on [employer] testing RTC In f/u - doing well on BID dextroamphetamine, XR 10mg dosing with midday IR booster.  3. Anxiety/ DAPHNE, recurrence of anxiety in 2016 with new job, with prior diagnosis in past- improved as have managed issues with attention deficit.  Off meds. Restart regular psychology advised.   4. Eval in 12/9/21 for RLQ pain and post meal watery stools. Labs and CT A/P were unrevealing -resolved diarrhea as of 1/2022.  C/W Linzess for known IBS-C issues.   4. Hx of Dysplastic lesion on back - s/p excision with Mohs'.   5. RLS - long term issue, was managed per neuro on Clonopin nightly --> ropinirole. Iron replete 2022.  C/W same.   6. Migraine HA - managed per HA clinic at [place], on Botox. PRN Cambia packets abortive strategy.   7. Acne, forehead - off Retin-A cream. Started Akief in 2021 along with Spironolactone, per derm.   8. Hx of Vitamin D deficiency - replete in past, off supplement.   9. Menorrhagia - failed IUD. S/P uterine ablation.    10. Environmental Allergies - controlled on immunotherapy weekly per allergy.  Off Singulair due to #1.   11. HM - labs d/w pt; Flu - next season; Tdap/ Hep A/ COVID - UTD; COVID booster advised; Pap/ Breast Exam UTD 4/2022 with Gyn, appt next month; C-scope 1/2018 (1 hyperplastic polyp); Hep C Ab (-) 3/2021; c/w exercise.     RTC one year CPE, F labs prior

## 2023-03-22 RX ORDER — LINACLOTIDE 72 UG/1
CAPSULE, GELATIN COATED ORAL
Qty: 90 CAPSULE | Refills: 3 | Status: SHIPPED | OUTPATIENT
Start: 2023-03-22

## 2023-11-13 ENCOUNTER — APPOINTMENT (OUTPATIENT)
Dept: WOMENS IMAGING | Facility: HOSPITAL | Age: 38
End: 2023-11-13
Payer: COMMERCIAL

## 2023-11-13 PROCEDURE — 76642 ULTRASOUND BREAST LIMITED: CPT | Performed by: RADIOLOGY

## 2023-11-13 PROCEDURE — 77062 BREAST TOMOSYNTHESIS BI: CPT | Performed by: RADIOLOGY

## 2023-11-13 PROCEDURE — 77066 DX MAMMO INCL CAD BI: CPT | Performed by: RADIOLOGY

## 2023-11-13 PROCEDURE — G0279 TOMOSYNTHESIS, MAMMO: HCPCS | Performed by: RADIOLOGY

## 2023-11-20 ENCOUNTER — PATIENT MESSAGE (OUTPATIENT)
Dept: INTERNAL MEDICINE | Facility: CLINIC | Age: 38
End: 2023-11-20
Payer: COMMERCIAL

## 2023-11-21 NOTE — TELEPHONE ENCOUNTER
From: Carito Ritter  To: Karl Snyder  Sent: 11/20/2023 12:49 PM EST  Subject: Linzess    Hello!  My IBS-C issues have been really bad for a few months, even with Linzess. I've tried fiber, multi-vitamins aimed at digestion, Magnesium, diet changes, water intake, and nothing has helped. I will be miserable for 4-5 days at a time, and then I have one equally miserable day in the bathroom. Then, repeat process.    I was on the higher dose of Linzess a while ago, and backed it down as I didn't need it then. Can I go back to the higher dose for a bit to see if it helps?    I can definitely come in for a visit- wasn't sure which was easiest. Thanks!

## 2024-01-31 ENCOUNTER — APPOINTMENT (OUTPATIENT)
Dept: CT IMAGING | Facility: HOSPITAL | Age: 39
End: 2024-01-31
Payer: COMMERCIAL

## 2024-01-31 ENCOUNTER — HOSPITAL ENCOUNTER (EMERGENCY)
Facility: HOSPITAL | Age: 39
Discharge: HOME | End: 2024-01-31
Attending: EMERGENCY MEDICINE | Admitting: EMERGENCY MEDICINE
Payer: COMMERCIAL

## 2024-01-31 VITALS
DIASTOLIC BLOOD PRESSURE: 75 MMHG | SYSTOLIC BLOOD PRESSURE: 118 MMHG | RESPIRATION RATE: 18 BRPM | HEART RATE: 90 BPM | OXYGEN SATURATION: 100 % | TEMPERATURE: 98 F

## 2024-01-31 DIAGNOSIS — N20.1 RIGHT URETERAL STONE: Primary | ICD-10-CM

## 2024-01-31 LAB
ALBUMIN SERPL-MCNC: 4.9 G/DL (ref 3.5–5.2)
ALBUMIN/GLOB SERPL: 1.7 G/DL
ALP SERPL-CCNC: 45 U/L (ref 39–117)
ALT SERPL W P-5'-P-CCNC: 22 U/L (ref 1–33)
ANION GAP SERPL CALCULATED.3IONS-SCNC: 10.7 MMOL/L (ref 5–15)
AST SERPL-CCNC: 27 U/L (ref 1–32)
BACTERIA UR QL AUTO: ABNORMAL /HPF
BASOPHILS # BLD AUTO: 0.02 10*3/MM3 (ref 0–0.2)
BASOPHILS NFR BLD AUTO: 0.4 % (ref 0–1.5)
BILIRUB SERPL-MCNC: 0.8 MG/DL (ref 0–1.2)
BILIRUB UR QL STRIP: NEGATIVE
BUN SERPL-MCNC: 20 MG/DL (ref 6–20)
BUN/CREAT SERPL: 30.3 (ref 7–25)
CALCIUM SPEC-SCNC: 9.6 MG/DL (ref 8.6–10.5)
CHLORIDE SERPL-SCNC: 101 MMOL/L (ref 98–107)
CLARITY UR: ABNORMAL
CO2 SERPL-SCNC: 25.3 MMOL/L (ref 22–29)
COLOR UR: ABNORMAL
CREAT SERPL-MCNC: 0.66 MG/DL (ref 0.57–1)
DEPRECATED RDW RBC AUTO: 44.2 FL (ref 37–54)
EGFRCR SERPLBLD CKD-EPI 2021: 114.6 ML/MIN/1.73
EOSINOPHIL # BLD AUTO: 0.02 10*3/MM3 (ref 0–0.4)
EOSINOPHIL NFR BLD AUTO: 0.4 % (ref 0.3–6.2)
ERYTHROCYTE [DISTWIDTH] IN BLOOD BY AUTOMATED COUNT: 13 % (ref 12.3–15.4)
GLOBULIN UR ELPH-MCNC: 2.9 GM/DL
GLUCOSE SERPL-MCNC: 104 MG/DL (ref 65–99)
GLUCOSE UR STRIP-MCNC: NEGATIVE MG/DL
HCG SERPL QL: NEGATIVE
HCT VFR BLD AUTO: 38.7 % (ref 34–46.6)
HGB BLD-MCNC: 13.1 G/DL (ref 12–15.9)
HGB UR QL STRIP.AUTO: ABNORMAL
HYALINE CASTS UR QL AUTO: ABNORMAL /LPF
IMM GRANULOCYTES # BLD AUTO: 0.01 10*3/MM3 (ref 0–0.05)
IMM GRANULOCYTES NFR BLD AUTO: 0.2 % (ref 0–0.5)
KETONES UR QL STRIP: ABNORMAL
LEUKOCYTE ESTERASE UR QL STRIP.AUTO: NEGATIVE
LYMPHOCYTES # BLD AUTO: 2.35 10*3/MM3 (ref 0.7–3.1)
LYMPHOCYTES NFR BLD AUTO: 48.4 % (ref 19.6–45.3)
MCH RBC QN AUTO: 31.9 PG (ref 26.6–33)
MCHC RBC AUTO-ENTMCNC: 33.9 G/DL (ref 31.5–35.7)
MCV RBC AUTO: 94.2 FL (ref 79–97)
MONOCYTES # BLD AUTO: 0.48 10*3/MM3 (ref 0.1–0.9)
MONOCYTES NFR BLD AUTO: 9.9 % (ref 5–12)
NEUTROPHILS NFR BLD AUTO: 1.98 10*3/MM3 (ref 1.7–7)
NEUTROPHILS NFR BLD AUTO: 40.7 % (ref 42.7–76)
NITRITE UR QL STRIP: NEGATIVE
NRBC BLD AUTO-RTO: 0 /100 WBC (ref 0–0.2)
PH UR STRIP.AUTO: 6 [PH] (ref 5–8)
PLATELET # BLD AUTO: 268 10*3/MM3 (ref 140–450)
PMV BLD AUTO: 9.2 FL (ref 6–12)
POTASSIUM SERPL-SCNC: 3.8 MMOL/L (ref 3.5–5.2)
PROT SERPL-MCNC: 7.8 G/DL (ref 6–8.5)
PROT UR QL STRIP: ABNORMAL
RBC # BLD AUTO: 4.11 10*6/MM3 (ref 3.77–5.28)
RBC # UR STRIP: ABNORMAL /HPF
REF LAB TEST METHOD: ABNORMAL
SODIUM SERPL-SCNC: 137 MMOL/L (ref 136–145)
SP GR UR STRIP: 1.03 (ref 1–1.03)
SQUAMOUS #/AREA URNS HPF: ABNORMAL /HPF
UROBILINOGEN UR QL STRIP: ABNORMAL
WBC # UR STRIP: ABNORMAL /HPF
WBC NRBC COR # BLD AUTO: 4.86 10*3/MM3 (ref 3.4–10.8)

## 2024-01-31 PROCEDURE — 84703 CHORIONIC GONADOTROPIN ASSAY: CPT | Performed by: PHYSICIAN ASSISTANT

## 2024-01-31 PROCEDURE — 25010000002 ONDANSETRON PER 1 MG: Performed by: PHYSICIAN ASSISTANT

## 2024-01-31 PROCEDURE — 85025 COMPLETE CBC W/AUTO DIFF WBC: CPT | Performed by: PHYSICIAN ASSISTANT

## 2024-01-31 PROCEDURE — 25510000001 IOPAMIDOL 61 % SOLUTION: Performed by: EMERGENCY MEDICINE

## 2024-01-31 PROCEDURE — 96374 THER/PROPH/DIAG INJ IV PUSH: CPT

## 2024-01-31 PROCEDURE — 96375 TX/PRO/DX INJ NEW DRUG ADDON: CPT

## 2024-01-31 PROCEDURE — 99285 EMERGENCY DEPT VISIT HI MDM: CPT

## 2024-01-31 PROCEDURE — 74177 CT ABD & PELVIS W/CONTRAST: CPT

## 2024-01-31 PROCEDURE — 25010000002 KETOROLAC TROMETHAMINE PER 15 MG: Performed by: PHYSICIAN ASSISTANT

## 2024-01-31 PROCEDURE — 80053 COMPREHEN METABOLIC PANEL: CPT | Performed by: PHYSICIAN ASSISTANT

## 2024-01-31 PROCEDURE — 81001 URINALYSIS AUTO W/SCOPE: CPT | Performed by: PHYSICIAN ASSISTANT

## 2024-01-31 RX ORDER — SODIUM CHLORIDE 0.9 % (FLUSH) 0.9 %
10 SYRINGE (ML) INJECTION AS NEEDED
Status: DISCONTINUED | OUTPATIENT
Start: 2024-01-31 | End: 2024-01-31 | Stop reason: HOSPADM

## 2024-01-31 RX ORDER — KETOROLAC TROMETHAMINE 15 MG/ML
15 INJECTION, SOLUTION INTRAMUSCULAR; INTRAVENOUS ONCE
Status: COMPLETED | OUTPATIENT
Start: 2024-01-31 | End: 2024-01-31

## 2024-01-31 RX ORDER — KETOROLAC TROMETHAMINE 10 MG/1
10 TABLET, FILM COATED ORAL EVERY 6 HOURS PRN
Qty: 10 TABLET | Refills: 0 | Status: SHIPPED | OUTPATIENT
Start: 2024-01-31

## 2024-01-31 RX ORDER — ONDANSETRON 2 MG/ML
4 INJECTION INTRAMUSCULAR; INTRAVENOUS ONCE
Status: COMPLETED | OUTPATIENT
Start: 2024-01-31 | End: 2024-01-31

## 2024-01-31 RX ADMIN — IOPAMIDOL 85 ML: 612 INJECTION, SOLUTION INTRAVENOUS at 10:04

## 2024-01-31 RX ADMIN — ONDANSETRON 4 MG: 2 INJECTION INTRAMUSCULAR; INTRAVENOUS at 09:22

## 2024-01-31 RX ADMIN — KETOROLAC TROMETHAMINE 15 MG: 15 INJECTION, SOLUTION INTRAMUSCULAR; INTRAVENOUS at 09:22

## 2024-01-31 NOTE — ED PROVIDER NOTES
MD ATTESTATION NOTE    The SUNG and I have discussed this patient's history, physical exam, and treatment plan.  I have reviewed the documentation and personally had a face to face interaction with the patient. I affirm the documentation and agree with the treatment and plan.  The attached note describes my personal findings.      I provided a substantive portion of the care of the patient.  I personally performed the physical exam in its entirety, and below are my findings.      Brief HPI: Patient presents for evaluation of right flank pain.  Patient states she had urinary tract symptoms and was started on Macrobid per telehealth visit.  Patient states pain got worse today and started having flank pain going into her groin.  Has had no vomiting or diarrhea.  Has had 1 prior kidney stone.    PHYSICAL EXAM  ED Triage Vitals   Temp Heart Rate Resp BP SpO2   01/31/24 0859 01/31/24 0859 01/31/24 0859 01/31/24 0901 01/31/24 0901   98 °F (36.7 °C) 107 24 129/84 100 %      Temp src Heart Rate Source Patient Position BP Location FiO2 (%)   -- -- -- -- --                GENERAL: no acute distress  HENT: nares patent  EYES: no scleral icterus  CV: regular rhythm, normal rate  RESPIRATORY: normal effort  ABDOMEN: soft.  Mild right CVA tenderness  MUSCULOSKELETAL: no deformity  NEURO: alert, moves all extremities, follows commands  PSYCH:  calm, cooperative  SKIN: warm, dry    Vital signs and nursing notes reviewed.    CT abdomen independently interpreted by me and does appear to have small stone on the right    ED Course as of 01/31/24 1428   Wed Jan 31, 2024   0933 Nitrite, UA: Negative [MP]   0933 Leukocytes, UA: Negative [MP]   0933 Appearance, UA(!): Cloudy [MP]   0951 RBC, UA(!): 21-50 [MP]   0951 WBC, UA(!): 3-5 [MP]   0951 Bacteria, UA: None Seen [MP]   0951 Squamous Epithelial Cells, UA(!): 3-6 [MP]   1016 CT abdomen and pelvis independently interpreted by me as no bowel obstruction [MP]   1038 Patient reports her pain  has significantly improved.  I did offer her additional pain medication here or pain medication for discharge and she declines.  Will discharge with short course of oral Toradol to use at home as needed for recurrent pain.  Will have patient follow-up with PCP as well as urology.  Discussed ED return precautions.  She is otherwise well-appearing, hemodynamically stable, and therefore appropriate for discharge. [MP]      ED Course User Index  [MP] Dai Anderson PA-C            Plan: Kidney infection versus kidney stone will DC home to follow-up with urology    SHARED VISIT: This visit was performed by BOTH a physician and an APC. The substantive portion of the medical decision making was performed by this attesting physician who made or approved the management plan and takes responsibility for patient management. All studies in the APC note (if performed) were independently interpreted by me.         Khanh Garcia MD  01/31/24 [id number]

## 2024-01-31 NOTE — ED NOTES
Pt to ed from home via PV    Pt dx uti yesterday, pt c/o lower abd pain and R flank pain. Pt also c/o nausea.

## 2024-01-31 NOTE — DISCHARGE INSTRUCTIONS
Follow-up with primary care provider.  Follow-up with [facility] for reevaluation after kidney stone.  Use Toradol as needed to help with pain.  Hydrate well.  Strain your urine to see if you passed the kidney stone.  Return to emergency department for any worsening symptoms.

## 2024-01-31 NOTE — ED PROVIDER NOTES
EMERGENCY DEPARTMENT ENCOUNTER  Room Number:  08/08  PCP: Karl Snyder MD  Independent Historians: Patient      HPI:  Chief Complaint: had concerns including Flank Pain.     A complete HPI/ROS/PMH/PSH/SH/FH are unobtainable due to: None    Chronic or social conditions impacting patient care (Social Determinants of Health): None      Context: Carito Ritter is a 38 y.o. female with a medical history of UTI's who presents to the ED c/o acute right-sided flank pain. Patient comes to ED today because this morning she started experiencing flank pain that waxes and wanes and was intense upon arriving in ED.  She reports the pain starts in the right lower quadrant of the abdomen and wraps around to the right side of her back.  She has had 1 prior kidney stone while she was 38 weeks pregnant.  Patient states she had some lower abdominal discomfort and dysuria Sunday that went away as the day went on. She states symptoms returned yesterday with more suprapubic tenderness and she scheduled a TeleHealth visit. She was prescribed Macrobid for an assumed UTI.  Patient states her urine has been darker in color but has not noticed any gross hematuria.  She denies injury or trauma to the abdomen.  She denies any fevers, nausea, vomiting, diarrhea, or any other systemic complaints.      Review of prior external notes (non-ED) -and- Review of prior external test results outside of this encounter:  Patient seen in office by PCP on 3/20/2023 for general adult examination.  Reviewed assessment and plan.  Plan to continue current medications, patient will return to office in 1 year.  Reviewed laboratory studies collected on 3/13/2023.  CBC with hemoglobin 13.6, CMP with creatinine 0.68.    Prescription drug monitoring program review:     N/A    PAST MEDICAL HISTORY  Active Ambulatory Problems     Diagnosis Date Noted    DAPHNE (generalized anxiety disorder) 12/06/2016    Avitaminosis D 12/06/2016    Panic attack 12/06/2016     Dermatofibroma of lower leg 01/30/2017    Migraine with aura and without status migrainosus, not intractable 01/30/2017    Irritable bowel syndrome with constipation 12/14/2017    RLS (restless legs syndrome) 03/27/2018    Acne vulgaris 03/04/2019    Environmental allergies 03/04/2019    Anorgasmia of female 10/27/2020    Menorrhagia with regular cycle 03/11/2021    ADHD (attention deficit hyperactivity disorder) 05/28/2021    Insomnia due to other mental disorder 03/20/2023     Resolved Ambulatory Problems     Diagnosis Date Noted    Acute pharyngitis 12/06/2016    Healthcare maintenance 01/30/2017    Migraine without aura and responsive to treatment 12/20/2016    Left ear pain 06/30/2018     Past Medical History:   Diagnosis Date    Allergic     Anxiety     Dysplastic skin lesion     Generalized anxiety disorder     Hx of hordeolum     IBS (irritable bowel syndrome)     Migraine headache     Vitamin D deficiency          PAST SURGICAL HISTORY  Past Surgical History:   Procedure Laterality Date    ADENOIDECTOMY      BREAST AUGMENTATION  2007    BREAST CAPSULOTOMY, IMPLANT REVISION  06/2020    BREAST SURGERY      cyst aspiration Right breast 2012    BROW LIFT AND BLEPHAROPLASTY  06/2020    COLONOSCOPY N/A 01/31/2018    One 7 mm polyp in the transverse colon, NBIH. Path: Hyperplastic polyp.     EXTERNAL EAR SURGERY      ear pining 2013 cosmetic    EYE SURGERY      lasik    EYE SURGERY Left 06/2022    PRK revision    MOHS SURGERY  2021    dysplastic lesion on back    TONSILLECTOMY AND ADENOIDECTOMY      2005         FAMILY HISTORY  Family History   Problem Relation Age of Onset    Asthma Mother     Breast cancer Mother         s/p mastectomy, stage 0    Hyperthyroidism Father     Nephrolithiasis Father     Diabetes Maternal Grandmother     Lung cancer Maternal Grandfather     Breast cancer Paternal Grandmother     Diabetes Paternal Grandmother     Hyperthyroidism Paternal Grandmother     ADD / ADHD Son     Charisse  Hyperthermia Neg Hx          SOCIAL HISTORY  Social History     Socioeconomic History    Marital status:     Number of children: 2   Tobacco Use    Smoking status: Never    Smokeless tobacco: Never   Vaping Use    Vaping Use: Never used   Substance and Sexual Activity    Alcohol use: Yes     Comment: 1-2 nori week --> 4-5 drinks/ week    Drug use: No    Sexual activity: Yes     Partners: Male     Birth control/protection: Surgical     Comment:  only; no hx STD's         ALLERGIES  Reglan [metoclopramide]      REVIEW OF SYSTEMS  Review of Systems   Constitutional:  Negative for chills and fever.   HENT:  Negative for ear pain and sore throat.    Respiratory:  Negative for cough and shortness of breath.    Cardiovascular:  Negative for chest pain and palpitations.   Gastrointestinal:  Positive for nausea. Negative for abdominal pain and vomiting.   Genitourinary:  Positive for flank pain. Negative for dysuria and hematuria.   Musculoskeletal:  Positive for back pain. Negative for arthralgias and joint swelling.   Skin:  Negative for pallor and rash.   Neurological:  Negative for numbness and headaches.   Psychiatric/Behavioral:  Negative for confusion and hallucinations.      Included in HPI  All systems reviewed and negative except for those discussed in HPI.      PHYSICAL EXAM    I have reviewed the triage vital signs and nursing notes.    ED Triage Vitals   Temp Heart Rate Resp BP SpO2   01/31/24 0859 01/31/24 0859 01/31/24 0859 01/31/24 0901 01/31/24 0901   98 °F (36.7 °C) 107 24 129/84 100 %      Temp src Heart Rate Source Patient Position BP Location FiO2 (%)   -- -- -- -- --              Physical Exam  Constitutional:       General: She is not in acute distress.     Appearance: She is well-developed.   HENT:      Head: Normocephalic and atraumatic.   Eyes:      Extraocular Movements: Extraocular movements intact.   Cardiovascular:      Rate and Rhythm: Normal rate and regular rhythm.      Heart  sounds: Normal heart sounds.   Pulmonary:      Effort: Pulmonary effort is normal.      Breath sounds: Normal breath sounds.   Abdominal:      General: There is no distension.      Tenderness: There is abdominal tenderness. There is right CVA tenderness.   Skin:     General: Skin is warm.   Neurological:      General: No focal deficit present.      Mental Status: She is alert and oriented to person, place, and time.   Psychiatric:         Mood and Affect: Mood normal.           LAB RESULTS  Recent Results (from the past 24 hour(s))   Comprehensive Metabolic Panel    Collection Time: 01/31/24  9:19 AM    Specimen: Blood   Result Value Ref Range    Glucose 104 (H) 65 - 99 mg/dL    BUN 20 6 - 20 mg/dL    Creatinine 0.66 0.57 - 1.00 mg/dL    Sodium 137 136 - 145 mmol/L    Potassium 3.8 3.5 - 5.2 mmol/L    Chloride 101 98 - 107 mmol/L    CO2 25.3 22.0 - 29.0 mmol/L    Calcium 9.6 8.6 - 10.5 mg/dL    Total Protein 7.8 6.0 - 8.5 g/dL    Albumin 4.9 3.5 - 5.2 g/dL    ALT (SGPT) 22 1 - 33 U/L    AST (SGOT) 27 1 - 32 U/L    Alkaline Phosphatase 45 39 - 117 U/L    Total Bilirubin 0.8 0.0 - 1.2 mg/dL    Globulin 2.9 gm/dL    A/G Ratio 1.7 g/dL    BUN/Creatinine Ratio 30.3 (H) 7.0 - 25.0    Anion Gap 10.7 5.0 - 15.0 mmol/L    eGFR 114.6 >60.0 mL/min/1.73   hCG, Serum, Qualitative    Collection Time: 01/31/24  9:19 AM    Specimen: Blood   Result Value Ref Range    HCG Qualitative Negative Negative   CBC Auto Differential    Collection Time: 01/31/24  9:19 AM    Specimen: Blood   Result Value Ref Range    WBC 4.86 3.40 - 10.80 10*3/mm3    RBC 4.11 3.77 - 5.28 10*6/mm3    Hemoglobin 13.1 12.0 - 15.9 g/dL    Hematocrit 38.7 34.0 - 46.6 %    MCV 94.2 79.0 - 97.0 fL    MCH 31.9 26.6 - 33.0 pg    MCHC 33.9 31.5 - 35.7 g/dL    RDW 13.0 12.3 - 15.4 %    RDW-SD 44.2 37.0 - 54.0 fl    MPV 9.2 6.0 - 12.0 fL    Platelets 268 140 - 450 10*3/mm3    Neutrophil % 40.7 (L) 42.7 - 76.0 %    Lymphocyte % 48.4 (H) 19.6 - 45.3 %    Monocyte % 9.9 5.0  - 12.0 %    Eosinophil % 0.4 0.3 - 6.2 %    Basophil % 0.4 0.0 - 1.5 %    Immature Grans % 0.2 0.0 - 0.5 %    Neutrophils, Absolute 1.98 1.70 - 7.00 10*3/mm3    Lymphocytes, Absolute 2.35 0.70 - 3.10 10*3/mm3    Monocytes, Absolute 0.48 0.10 - 0.90 10*3/mm3    Eosinophils, Absolute 0.02 0.00 - 0.40 10*3/mm3    Basophils, Absolute 0.02 0.00 - 0.20 10*3/mm3    Immature Grans, Absolute 0.01 0.00 - 0.05 10*3/mm3    nRBC 0.0 0.0 - 0.2 /100 WBC   Urinalysis With Microscopic If Indicated (No Culture) - Urine, Clean Catch    Collection Time: 01/31/24  9:20 AM    Specimen: Urine, Clean Catch   Result Value Ref Range    Color, UA Dark Yellow (A) Yellow, Straw    Appearance, UA Cloudy (A) Clear    pH, UA 6.0 5.0 - 8.0    Specific Gravity, UA 1.029 1.005 - 1.030    Glucose, UA Negative Negative    Ketones, UA Trace (A) Negative    Bilirubin, UA Negative Negative    Blood, UA Trace (A) Negative    Protein, UA Trace (A) Negative    Leuk Esterase, UA Negative Negative    Nitrite, UA Negative Negative    Urobilinogen, UA 0.2 E.U./dL 0.2 - 1.0 E.U./dL   Urinalysis, Microscopic Only - Urine, Clean Catch    Collection Time: 01/31/24  9:20 AM    Specimen: Urine, Clean Catch   Result Value Ref Range    RBC, UA 21-50 (A) None Seen, 0-2 /HPF    WBC, UA 3-5 (A) None Seen, 0-2 /HPF    Bacteria, UA None Seen None Seen /HPF    Squamous Epithelial Cells, UA 3-6 (A) None Seen, 0-2 /HPF    Hyaline Casts, UA 7-12 None Seen /LPF    Methodology Automated Microscopy          RADIOLOGY  CT Abdomen Pelvis With Contrast    Result Date: 1/31/2024  CT ABDOMEN PELVIS W CONTRAST-  INDICATIONS: Right flank pain  TECHNIQUE: Radiation dose reduction techniques were utilized, including automated exposure control and exposure modulation based on body size. Enhanced ABDOMEN AND PELVIS CT  COMPARISON: 12/9/2021  FINDINGS:  The distal ureters are difficult to distinguish, but there appears to be a 1-2 mm distal right ureteral stone on axial image 129, with mild  right hydronephrosis. No left hydronephrosis.  At the hepatic dome, tiny liver low-density is noted, too small to characterize.  Otherwise unremarkable appearance of the liver, gallbladder, spleen, adrenal glands, pancreas, kidneys, bladder.  No bowel obstruction or abnormal bowel thickening is identified.  No free intraperitoneal gas or free fluid. Minimal umbilical hernia of fat is present.  Scattered small mesenteric and para-aortic lymph nodes are seen that are not significant by size criteria.  Abdominal aorta is not aneurysmal.  The lung bases show minimal bilateral dependent atelectasis.  Degenerative changes are seen in the spine. No acute fracture is identified.          1. Apparent 1-2 mm stone at the distal right ureter with mild right hydronephrosis.  2. No acute inflammatory process of bowel is identified, follow-up as indications persist.  This report was finalized on 1/31/2024 10:24 AM by Dr. Zohaib Salazar M.D on Workstation: [id number]         MEDICATIONS GIVEN IN ER  Medications   ketorolac (TORADOL) injection 15 mg (15 mg Intravenous Given 1/31/24 0922)   ondansetron (ZOFRAN) injection 4 mg (4 mg Intravenous Given 1/31/24 0922)   iopamidol (ISOVUE-300) 61 % injection 100 mL (85 mL Intravenous Given by Other 1/31/24 1004)         ORDERS PLACED DURING THIS VISIT:  Orders Placed This Encounter   Procedures    CT Abdomen Pelvis With Contrast    Comprehensive Metabolic Panel    hCG, Serum, Qualitative    Urinalysis With Microscopic If Indicated (No Culture) - Urine, Clean Catch    CBC Auto Differential    Urinalysis, Microscopic Only - Urine, Clean Catch    CBC & Differential         OUTPATIENT MEDICATION MANAGEMENT:  No current Epic-ordered facility-administered medications on file.     Current Outpatient Medications Ordered in Epic   Medication Sig Dispense Refill    ALPRAZolam (XANAX) 1 MG tablet       Auvi-Q 0.3 MG/0.3ML solution auto-injector injection INJECT AS NEEDED FOR SEVERE ALLERGIC  REACTION INCLUDING ANAPHYLAXIS AS DIRECTED      dextroamphetamine (DEXTROSTAT) 10 MG tablet       Dextroamphetamine Sulfate 15 MG tablet       Diclofenac Potassium 50 MG pack Take  by mouth As Needed (migraines).      ketorolac (TORADOL) 10 MG tablet Take 1 tablet by mouth Every 6 (Six) Hours As Needed for Moderate Pain. 10 tablet 0    linaclotide (Linzess) 145 MCG capsule capsule Take 1 capsule by mouth Every Morning Before Breakfast. 30 capsule 5    rOPINIRole (REQUIP) 0.5 MG tablet Take 1 tablet by mouth Every Night. Take 1 hour before bedtime.      spironolactone (ALDACTONE) 50 MG tablet              PROGRESS, DATA ANALYSIS, CONSULTS, AND MEDICAL DECISION MAKING  All labs have been independently interpreted by me.  All radiology studies have been reviewed by me. All EKG's have been independently viewed and interpreted by me.  Discussion below represents my analysis of pertinent findings related to patient's condition, differential diagnosis, treatment plan and final disposition.    Differential diagnosis includes but is not limited to nephrolithiasis, hydronephrosis, pyelonephritis, appendicitis, musculoskeletal pain          ED Course as of 01/31/24 1638   Wed Jan 31, 2024   0933 Nitrite, UA: Negative [MP]   0933 Leukocytes, UA: Negative [MP]   0933 Appearance, UA(!): Cloudy [MP]   0951 RBC, UA(!): 21-50 [MP]   0951 WBC, UA(!): 3-5 [MP]   0951 Bacteria, UA: None Seen [MP]   0951 Squamous Epithelial Cells, UA(!): 3-6 [MP]   1016 CT abdomen and pelvis independently interpreted by me as no bowel obstruction [MP]   1038 Patient reports her pain has significantly improved.  I did offer her additional pain medication here or pain medication for discharge and she declines.  Will discharge with short course of oral Toradol to use at home as needed for recurrent pain.  Will have patient follow-up with PCP as well as urology.  Discussed ED return precautions.  She is otherwise well-appearing, hemodynamically stable, and  therefore appropriate for discharge. [MP]      ED Course User Index  [MP] Dai Anderson PA-C             AS OF 16:38 EST VITALS:    BP - 118/75  HR - 90  TEMP - 98 °F (36.7 °C)  O2 SATS - 100%    COMPLEXITY OF CARE  Admission was considered but after careful review of the patient's presentation, physical examination, diagnostic results, and response to treatment the patient may be safely discharged with outpatient follow-up.      DIAGNOSIS  Final diagnoses:   Right ureteral stone         DISPOSITION  ED Disposition       ED Disposition   Discharge    Condition   Stable    Comment   --                Please note that portions of this document were completed with a voice recognition program.    Note Disclaimer: At [facility], we believe that sharing information builds trust and better relationships. You are receiving this note because you recently visited [facility]. It is possible you will see health information before a provider has talked with you about it. This kind of information can be easy to misunderstand. To help you fully understand what it means for your health, we urge you to discuss this note with your provider.         Dai Anderson PA-C  01/31/24 0365

## 2024-03-14 DIAGNOSIS — Z00.00 ANNUAL PHYSICAL EXAM: Primary | ICD-10-CM

## 2024-03-14 DIAGNOSIS — E55.9 AVITAMINOSIS D: ICD-10-CM

## 2024-03-19 LAB
ALBUMIN SERPL-MCNC: 4.9 G/DL (ref 3.5–5.2)
ALBUMIN/GLOB SERPL: 1.9 G/DL
ALP SERPL-CCNC: 45 U/L (ref 39–117)
ALT SERPL-CCNC: 17 U/L (ref 1–33)
APPEARANCE UR: CLEAR
AST SERPL-CCNC: 15 U/L (ref 1–32)
BACTERIA #/AREA URNS HPF: ABNORMAL /HPF
BACTERIA UR CULT: ABNORMAL
BACTERIA UR CULT: ABNORMAL
BASOPHILS # BLD AUTO: 0.02 10*3/MM3 (ref 0–0.2)
BASOPHILS NFR BLD AUTO: 0.5 % (ref 0–1.5)
BILIRUB SERPL-MCNC: 0.8 MG/DL (ref 0–1.2)
BILIRUB UR QL STRIP: NEGATIVE
BUN SERPL-MCNC: 15 MG/DL (ref 6–20)
BUN/CREAT SERPL: 20.8 (ref 7–25)
CALCIUM SERPL-MCNC: 9.5 MG/DL (ref 8.6–10.5)
CASTS URNS QL MICRO: ABNORMAL /LPF
CHLORIDE SERPL-SCNC: 102 MMOL/L (ref 98–107)
CHOLEST SERPL-MCNC: 180 MG/DL (ref 0–200)
CO2 SERPL-SCNC: 24.6 MMOL/L (ref 22–29)
COLOR UR: YELLOW
CREAT SERPL-MCNC: 0.72 MG/DL (ref 0.57–1)
CRYSTALS URNS MICRO: ABNORMAL
EGFRCR SERPLBLD CKD-EPI 2021: 109.2 ML/MIN/1.73
EOSINOPHIL # BLD AUTO: 0.05 10*3/MM3 (ref 0–0.4)
EOSINOPHIL NFR BLD AUTO: 1.2 % (ref 0.3–6.2)
EPI CELLS #/AREA URNS HPF: >10 /HPF (ref 0–10)
ERYTHROCYTE [DISTWIDTH] IN BLOOD BY AUTOMATED COUNT: 12.2 % (ref 12.3–15.4)
GLOBULIN SER CALC-MCNC: 2.6 GM/DL
GLUCOSE SERPL-MCNC: 80 MG/DL (ref 65–99)
GLUCOSE UR QL STRIP: NEGATIVE
HBA1C MFR BLD: 5 % (ref 4.8–5.6)
HCT VFR BLD AUTO: 39.7 % (ref 34–46.6)
HDLC SERPL-MCNC: 70 MG/DL (ref 40–60)
HGB BLD-MCNC: 13.1 G/DL (ref 12–15.9)
HGB UR QL STRIP: NEGATIVE
IMM GRANULOCYTES # BLD AUTO: 0.02 10*3/MM3 (ref 0–0.05)
IMM GRANULOCYTES NFR BLD AUTO: 0.5 % (ref 0–0.5)
KETONES UR QL STRIP: NEGATIVE
LDLC SERPL CALC-MCNC: 97 MG/DL (ref 0–100)
LDLC/HDLC SERPL: 1.37 {RATIO}
LEUKOCYTE ESTERASE UR QL STRIP: ABNORMAL
LYMPHOCYTES # BLD AUTO: 1.67 10*3/MM3 (ref 0.7–3.1)
LYMPHOCYTES NFR BLD AUTO: 38.5 % (ref 19.6–45.3)
MCH RBC QN AUTO: 30.4 PG (ref 26.6–33)
MCHC RBC AUTO-ENTMCNC: 33 G/DL (ref 31.5–35.7)
MCV RBC AUTO: 92.1 FL (ref 79–97)
MICRO URNS: ABNORMAL
MONOCYTES # BLD AUTO: 0.51 10*3/MM3 (ref 0.1–0.9)
MONOCYTES NFR BLD AUTO: 11.8 % (ref 5–12)
MUCOUS THREADS URNS QL MICRO: PRESENT /HPF
NEUTROPHILS # BLD AUTO: 2.07 10*3/MM3 (ref 1.7–7)
NEUTROPHILS NFR BLD AUTO: 47.5 % (ref 42.7–76)
NITRITE UR QL STRIP: POSITIVE
NRBC BLD AUTO-RTO: 0 /100 WBC (ref 0–0.2)
OTHER ANTIBIOTIC SUSC ISLT: ABNORMAL
PH UR STRIP: 6 [PH] (ref 5–7.5)
PLATELET # BLD AUTO: 303 10*3/MM3 (ref 140–450)
POTASSIUM SERPL-SCNC: 4.3 MMOL/L (ref 3.5–5.2)
PROT SERPL-MCNC: 7.5 G/DL (ref 6–8.5)
PROT UR QL STRIP: ABNORMAL
RBC # BLD AUTO: 4.31 10*6/MM3 (ref 3.77–5.28)
RBC #/AREA URNS HPF: ABNORMAL /HPF (ref 0–2)
SODIUM SERPL-SCNC: 138 MMOL/L (ref 136–145)
SP GR UR STRIP: 1.03 (ref 1–1.03)
TRIGL SERPL-MCNC: 72 MG/DL (ref 0–150)
TSH SERPL DL<=0.005 MIU/L-ACNC: 3.1 UIU/ML (ref 0.27–4.2)
UNIDENT CRYS URNS QL MICRO: PRESENT /LPF
URINALYSIS REFLEX: ABNORMAL
UROBILINOGEN UR STRIP-MCNC: 0.2 MG/DL (ref 0.2–1)
VLDLC SERPL CALC-MCNC: 13 MG/DL (ref 5–40)
WBC # BLD AUTO: 4.34 10*3/MM3 (ref 3.4–10.8)
WBC #/AREA URNS HPF: ABNORMAL /HPF (ref 0–5)

## 2024-03-21 ENCOUNTER — OFFICE VISIT (OUTPATIENT)
Dept: INTERNAL MEDICINE | Facility: CLINIC | Age: 39
End: 2024-03-21
Payer: COMMERCIAL

## 2024-03-21 VITALS
HEART RATE: 87 BPM | HEIGHT: 65 IN | OXYGEN SATURATION: 100 % | TEMPERATURE: 97.9 F | SYSTOLIC BLOOD PRESSURE: 120 MMHG | BODY MASS INDEX: 23.94 KG/M2 | DIASTOLIC BLOOD PRESSURE: 70 MMHG | WEIGHT: 143.7 LBS

## 2024-03-21 DIAGNOSIS — F51.05 INSOMNIA DUE TO OTHER MENTAL DISORDER: ICD-10-CM

## 2024-03-21 DIAGNOSIS — N92.0 MENORRHAGIA WITH REGULAR CYCLE: ICD-10-CM

## 2024-03-21 DIAGNOSIS — F41.1 GAD (GENERALIZED ANXIETY DISORDER): ICD-10-CM

## 2024-03-21 DIAGNOSIS — K58.1 IRRITABLE BOWEL SYNDROME WITH CONSTIPATION: ICD-10-CM

## 2024-03-21 DIAGNOSIS — G43.109 MIGRAINE WITH AURA AND WITHOUT STATUS MIGRAINOSUS, NOT INTRACTABLE: ICD-10-CM

## 2024-03-21 DIAGNOSIS — N76.0 BACTERIAL VAGINOSIS: ICD-10-CM

## 2024-03-21 DIAGNOSIS — B96.89 BACTERIAL VAGINOSIS: ICD-10-CM

## 2024-03-21 DIAGNOSIS — Z91.09 ENVIRONMENTAL ALLERGIES: ICD-10-CM

## 2024-03-21 DIAGNOSIS — N60.02 BREAST CYST, LEFT: ICD-10-CM

## 2024-03-21 DIAGNOSIS — N20.0 NEPHROLITHIASIS: ICD-10-CM

## 2024-03-21 DIAGNOSIS — F99 INSOMNIA DUE TO OTHER MENTAL DISORDER: ICD-10-CM

## 2024-03-21 DIAGNOSIS — Z23 NEED FOR COVID-19 VACCINE: ICD-10-CM

## 2024-03-21 DIAGNOSIS — E55.9 AVITAMINOSIS D: ICD-10-CM

## 2024-03-21 DIAGNOSIS — L70.0 ACNE VULGARIS: ICD-10-CM

## 2024-03-21 DIAGNOSIS — Z00.01 ENCOUNTER FOR GENERAL ADULT MEDICAL EXAMINATION WITH ABNORMAL FINDINGS: Primary | ICD-10-CM

## 2024-03-21 DIAGNOSIS — F90.2 ATTENTION DEFICIT HYPERACTIVITY DISORDER (ADHD), COMBINED TYPE: ICD-10-CM

## 2024-03-21 DIAGNOSIS — G25.81 RLS (RESTLESS LEGS SYNDROME): ICD-10-CM

## 2024-03-21 RX ORDER — METRONIDAZOLE 500 MG/1
500 TABLET ORAL 2 TIMES DAILY
COMMUNITY
Start: 2024-03-17 | End: 2024-03-24

## 2024-03-21 NOTE — PROGRESS NOTES
"Annual Exam      HPI  Carito Ritter is a 38 y.o. female RTC in yearly CPE, review of medical issues:   1. Kidney stone - recent event last month.  Passed.  Did see urology prior to passing.  Had CT for eval.  Was not able to catch stone, but did feel immediate relief when passed.  Has not seen urology since passed.    2. Recent issue with vaginal odor.  No itching.  Saw [facility] and dx'd with BV.  Used metronidazole and on day 5/7 of tx.  \"Gone in one day\".  Has some nausea side effects with that med.   3. Breast cyst on L - noted recently.  Had mammo and US. Has short interval f/u upcoming.    4. Insomnia - long term issue, saw Dr. Sung and 'graduated'. Is off Ambien and pleased with progress.  Off Ambien.  Feels like 'retraining and not panicking when I can't sleep'.  Learning how to lean away from crutch of pill. \"Has tools to help when has bad night'.     5. ADHD with Focalin and Ritalin moderate gene-drug interactions on GeneSight testing RTC In f/u - doing well on BID dextroamphetamine. Sees psych regularly. Working well, 'I really think so'.   6. Anxiety/ DAPHNE, recurrence of anxiety in 2016 with new job, with prior diagnosis in past- improved as have managed issues with attention deficit.  See psych once monthly.  Off Ambien not taking Xanax now.  Still seeing talk therapist ~q 2 weeks.   7. Hx of Dysplastic lesion on back - s/p excision with Mohs'. Sees derm yearly recently and no new lesions.   8. RLS - long term issue, was managed per neuro on Clonopin nightly --> ropinirole. Iron replete 2022.\"Same'. Tried to come off and 'was bad news'.   9. Migraine HA - managed per HA clinic at [facility], on Botox. PRN Cambia packets abortive strategy. No changes. Seeing HA clinic q 3 months, 'in and out'.   10. Acne, forehead - off Retin-A cream/ topicals. On Spironolactone, per derm.   11. HM - not had COVID update, willing to complete    Review of Systems   Constitutional: Negative for chills, fever, " malaise/fatigue and weight gain.   HENT:  Negative for congestion, hearing loss and sore throat.    Eyes:  Negative for discharge, double vision, pain and redness.        Last eye exam ~9/2023. No issues.        Cardiovascular:  Negative for chest pain, leg swelling, near-syncope, palpitations and syncope.   Respiratory:  Negative for cough and shortness of breath.    Endocrine: Negative for polydipsia, polyphagia and polyuria.   Hematologic/Lymphatic: Negative for bleeding problem. Does not bruise/bleed easily.   Skin:  Negative for rash and suspicious lesions.   Musculoskeletal:  Negative for joint pain, joint swelling, muscle cramps, muscle weakness and myalgias.   Gastrointestinal:  Positive for nausea (this week, wtih metronidazole). Negative for bloating, abdominal pain ('feels tight' since passed stone), constipation, diarrhea, heartburn and vomiting.   Genitourinary:  Negative for dysuria, frequency, hematuria, hesitancy and incomplete emptying.   Neurological:  Positive for headaches (migriane only). Negative for dizziness and light-headedness.   Psychiatric/Behavioral:  Negative for altered mental status and depression. The patient does not have insomnia (resolved issue) and is not nervous/anxious.    Allergic/Immunologic: Negative for environmental allergies and persistent infections.       Problem List:    Patient Active Problem List   Diagnosis    DAPHNE (generalized anxiety disorder)    Avitaminosis D    Dermatofibroma of lower leg    Migraine with aura and without status migrainosus, not intractable    Irritable bowel syndrome with constipation    RLS (restless legs syndrome)    Acne vulgaris    Environmental allergies    Anorgasmia of female    Menorrhagia with regular cycle    ADHD (attention deficit hyperactivity disorder)    Insomnia due to other mental disorder    Nephrolithiasis    Breast cyst, left       Medical History:    Past Medical History:   Diagnosis Date    Acne vulgaris 03/04/2019    Sees  derm annually     ADHD (attention deficit hyperactivity disorder)     Allergic     Anxiety     Dysplastic skin lesion     on back, s/p Moh's 2021    Environmental allergies 03/04/2019    Generalized anxiety disorder     Hx of hordeolum     recurrent in L eye    IBS (irritable bowel syndrome)     Migraine headache     Panic attack 12/06/2016    Vitamin D deficiency         Social History:    Social History     Socioeconomic History    Marital status:     Number of children: 2   Tobacco Use    Smoking status: Never     Passive exposure: Never    Smokeless tobacco: Never   Vaping Use    Vaping status: Never Used   Substance and Sexual Activity    Alcohol use: Yes     Comment: 1-2 nori week --> 4-5 drinks/ week    Drug use: No    Sexual activity: Yes     Partners: Male     Birth control/protection: Surgical     Comment:  only; no hx STD's       Family History:   Family History   Problem Relation Age of Onset    Asthma Mother     Breast cancer Mother         s/p mastectomy, stage 0    Hyperthyroidism Father     Nephrolithiasis Father     Diabetes Maternal Grandmother     Lung cancer Maternal Grandfather     Breast cancer Paternal Grandmother     Diabetes Paternal Grandmother     Hyperthyroidism Paternal Grandmother     ADD / ADHD Son     Malig Hyperthermia Neg Hx        Surgical History:   Past Surgical History:   Procedure Laterality Date    ADENOIDECTOMY      BREAST AUGMENTATION  2007    BREAST CAPSULOTOMY, IMPLANT REVISION  06/2020    BREAST SURGERY      cyst aspiration Right breast 2012    BROW LIFT AND BLEPHAROPLASTY  06/2020    COLONOSCOPY N/A 01/31/2018    One 7 mm polyp in the transverse colon, NBIH. Path: Hyperplastic polyp.     EXTERNAL EAR SURGERY      ear pining 2013 cosmetic    EYE SURGERY      lasik    EYE SURGERY Left 06/2022    PRK revision    MOHS SURGERY  2021    dysplastic lesion on back    TONSILLECTOMY AND ADENOIDECTOMY      2005         Current Outpatient Medications:     ALLERGY  INJECTION SERUM OFFICE ADMINISTERED, Inject  under the skin into the appropriate area as directed 1 (One) Time Per Week., Disp: , Rfl:     Auvi-Q 0.3 MG/0.3ML solution auto-injector injection, INJECT AS NEEDED FOR SEVERE ALLERGIC REACTION INCLUDING ANAPHYLAXIS AS DIRECTED, Disp: , Rfl:     Dextroamphetamine Sulfate 15 MG tablet, Take 1 tablet by mouth 2 (Two) Times a Day., Disp: , Rfl:     Diclofenac Potassium 50 MG pack, Take  by mouth As Needed (migraines)., Disp: , Rfl:     linaclotide (Linzess) 145 MCG capsule capsule, Take 1 capsule by mouth Every Morning Before Breakfast., Disp: 90 capsule, Rfl: 2    metroNIDAZOLE (FLAGYL) 500 MG tablet, Take 1 tablet by mouth 2 (Two) Times a Day., Disp: , Rfl:     OnabotulinumtoxinA, Cosmetic, (BOTOX COSMETIC IM), Every 3 (Three) Months., Disp: , Rfl:     rOPINIRole (REQUIP) 0.5 MG tablet, Take 1 tablet by mouth Every Night. Take 1 hour before bedtime., Disp: , Rfl:     spironolactone (ALDACTONE) 50 MG tablet, , Disp: , Rfl:     Vitals:    03/21/24 1520   BP: 120/70   Pulse: 87   Temp: 97.9 °F (36.6 °C)   SpO2: 100%     Body mass index is 23.91 kg/m².    Physical Exam  Vitals reviewed.   Constitutional:       General: She is not in acute distress.     Appearance: Normal appearance. She is well-developed. She is not ill-appearing or toxic-appearing.   HENT:      Head: Normocephalic and atraumatic.      Right Ear: Hearing, tympanic membrane, ear canal and external ear normal. There is no impacted cerumen.      Left Ear: Hearing, tympanic membrane, ear canal and external ear normal. There is no impacted cerumen.      Nose: Nose normal.      Mouth/Throat:      Mouth: Mucous membranes are moist. No injury or oral lesions.      Dentition: Does not have dentures.      Tongue: No lesions.      Pharynx: Oropharynx is clear. Uvula midline. No pharyngeal swelling, oropharyngeal exudate, posterior oropharyngeal erythema or uvula swelling.   Eyes:      General: Lids are normal. No scleral  icterus.        Right eye: No discharge.         Left eye: No discharge.      Extraocular Movements: Extraocular movements intact.      Conjunctiva/sclera: Conjunctivae normal.      Pupils: Pupils are equal, round, and reactive to light.   Neck:      Thyroid: No thyroid mass or thyromegaly.      Vascular: No carotid bruit.      Trachea: Trachea normal.   Cardiovascular:      Rate and Rhythm: Normal rate and regular rhythm.      Pulses:           Radial pulses are 2+ on the right side and 2+ on the left side.        Dorsalis pedis pulses are 2+ on the right side and 2+ on the left side.        Posterior tibial pulses are 2+ on the right side and 2+ on the left side.      Heart sounds: Normal heart sounds, S1 normal and S2 normal. No murmur heard.     No friction rub. No gallop.   Pulmonary:      Effort: Pulmonary effort is normal. No respiratory distress.      Breath sounds: Normal breath sounds. No wheezing, rhonchi or rales.   Abdominal:      General: Bowel sounds are normal. There is no distension.      Palpations: Abdomen is soft. There is no mass.      Tenderness: There is no abdominal tenderness. There is no guarding or rebound.   Musculoskeletal:         General: Normal range of motion.      Right shoulder: No tenderness, bony tenderness or crepitus. Normal range of motion.      Left shoulder: No tenderness, bony tenderness or crepitus. Normal range of motion.      Right hand: No tenderness or bony tenderness. Normal range of motion. Normal strength.      Left hand: No tenderness or bony tenderness. Normal range of motion. Normal strength.      Cervical back: Full passive range of motion without pain. No rigidity. No muscular tenderness. Normal range of motion.      Right lower leg: No edema.      Left lower leg: No edema.      Right foot: Normal range of motion. No deformity or bunion.      Left foot: Normal range of motion. No deformity or bunion.   Feet:      Right foot:      Skin integrity: No ulcer,  blister or skin breakdown.      Left foot:      Skin integrity: No ulcer, blister or skin breakdown.   Lymphadenopathy:      Cervical: No cervical adenopathy.      Upper Body:      Right upper body: No supraclavicular, axillary or pectoral adenopathy.      Left upper body: No supraclavicular, axillary or pectoral adenopathy.      Lower Body: No right inguinal adenopathy. No left inguinal adenopathy.   Skin:     General: Skin is warm and dry.      Findings: No rash.   Neurological:      Mental Status: She is alert and oriented to person, place, and time.      Cranial Nerves: No cranial nerve deficit, dysarthria or facial asymmetry.      Sensory: No sensory deficit.      Motor: No weakness, tremor, atrophy or abnormal muscle tone.      Gait: Gait normal.      Deep Tendon Reflexes: Reflexes are normal and symmetric.      Reflex Scores:       Patellar reflexes are 2+ on the right side and 2+ on the left side.       Achilles reflexes are 2+ on the right side and 2+ on the left side.  Psychiatric:         Mood and Affect: Mood normal.         Behavior: Behavior normal.         Thought Content: Thought content normal.         Assessment/ Plan  Diagnoses and all orders for this visit:    Encounter for general adult medical examination with abnormal findings    RLS (restless legs syndrome)    Migraine with aura and without status migrainosus, not intractable    Menorrhagia with regular cycle    Irritable bowel syndrome with constipation    Insomnia due to other mental disorder    DAPHNE (generalized anxiety disorder)    Environmental allergies    Avitaminosis D    Attention deficit hyperactivity disorder (ADHD), combined type    Acne vulgaris    Need for COVID-19 vaccine  -     COVID-19 F23 (Pfizer) 12yrs+ (COMIRNATY)    Nephrolithiasis    Breast cyst, left    Bacterial vaginosis    Other orders  -     metroNIDAZOLE (FLAGYL) 500 MG tablet; Take 1 tablet by mouth 2 (Two) Times a Day.  -     ALLERGY INJECTION SERUM OFFICE  ADMINISTERED; Inject  under the skin into the appropriate area as directed 1 (One) Time Per Week.  -     OnabotulinumtoxinA, Cosmetic, (BOTOX COSMETIC IM); Every 3 (Three) Months.  -     linaclotide (Linzess) 145 MCG capsule capsule; Take 1 capsule by mouth Every Morning Before Breakfast.        Return in about 1 year (around 3/21/2025) for Annual physical.      Discussion:  Carito Ritter is a 38 y.o. female RTC in yearly CPE, review of medical issues: Doing really quite well overall.  1. Kidney stone - recent event last month, passes spontaneously. S/P eval with urology, not unavailable.  U/A in office (post stone) without hematuria.  Pt will call with any recurrent sx.  Ca++oxalate crystals in urine, suggests etiology. Diet handout provided today.   2. BV - recent empiric dx from [facility].  Responding to course of metronidazole orally.  Nausea side effect noted.   3. Breast cyst, L - new issue. Mammo and US per Gyn, short interval f/u planned.   4. Insomnia - long term issue, s/p psychology tx with Dr. Sung, issue largely resolve.  OFf Ambien. Congraulated on efforts.   5. ADHD with Focalin and Ritalin moderate gene-drug interactions on GeneSight testing RTC In f/u - doing well on BID dextroamphetamine, per psych.   6. Anxiety/ DAPHNE, recurrence of anxiety in 2016 with new job, with prior diagnosis in past- improved as have managed issues with attention deficit, seeing psychology regularly with provisional dx of PTSD element.  C/W therapy, making marked strides in anxiety control.   7.  IBS-C  - managed on Linzess.  Refilled today.   8. Hx of Dysplastic lesion on back - s/p excision with Mohs'. Sees derm yearly and no new lesions on recent exam.   9. RLS - long term issue, was managed per neuro on Clonopin nightly --> ropinirole. Iron replete 2022. Failed trial off in 2023. Controlled.    10. Migraine HA - managed per HA clinic at [facility], on Botox. PRN Cambia packets abortive strategy.   11. Acne, forehead -  off Retin-A cream/ topicals. Controlled on Spironolactone, per derm.   12. Menorrhagia - failed IUD. S/P uterine ablation.    13. Environmental Allergies - controlled on immunotherapy weekly per allergy.    14. HM - labs d/w pt; Flu - next season; Tdap/ Hep A/ COVID - UTD; COVID update today; Pap/ Breast Exam UTD 4/2023 with Gyn, sees annually; C-scope 1/2018 (1 hyperplastic polyp); Hep C Ab (-) 3/2021; c/w exercise    RTC one year CPE, F labs prior

## 2024-05-15 ENCOUNTER — APPOINTMENT (OUTPATIENT)
Dept: WOMENS IMAGING | Facility: HOSPITAL | Age: 39
End: 2024-05-15
Payer: COMMERCIAL

## 2024-05-15 PROCEDURE — 76642 ULTRASOUND BREAST LIMITED: CPT | Performed by: RADIOLOGY

## 2024-11-18 ENCOUNTER — APPOINTMENT (OUTPATIENT)
Dept: WOMENS IMAGING | Facility: HOSPITAL | Age: 39
End: 2024-11-18
Payer: COMMERCIAL

## 2024-11-18 PROCEDURE — 77062 BREAST TOMOSYNTHESIS BI: CPT | Performed by: RADIOLOGY

## 2024-11-18 PROCEDURE — 77066 DX MAMMO INCL CAD BI: CPT | Performed by: RADIOLOGY

## 2024-11-18 PROCEDURE — 76642 ULTRASOUND BREAST LIMITED: CPT | Performed by: RADIOLOGY

## 2024-11-18 PROCEDURE — G0279 TOMOSYNTHESIS, MAMMO: HCPCS | Performed by: RADIOLOGY

## 2024-12-30 RX ORDER — LINACLOTIDE 145 UG/1
CAPSULE, GELATIN COATED ORAL
Qty: 90 CAPSULE | Refills: 2 | Status: SHIPPED | OUTPATIENT
Start: 2024-12-30

## 2025-03-20 DIAGNOSIS — Z00.00 ANNUAL PHYSICAL EXAM: Primary | ICD-10-CM

## 2025-03-20 DIAGNOSIS — E55.9 AVITAMINOSIS D: ICD-10-CM

## 2025-03-20 DIAGNOSIS — Z13.29 SCREENING FOR THYROID DISORDER: ICD-10-CM

## 2025-03-27 ENCOUNTER — OFFICE VISIT (OUTPATIENT)
Dept: INTERNAL MEDICINE | Facility: CLINIC | Age: 40
End: 2025-03-27
Payer: COMMERCIAL

## 2025-03-27 VITALS
BODY MASS INDEX: 24.11 KG/M2 | DIASTOLIC BLOOD PRESSURE: 74 MMHG | HEIGHT: 65 IN | HEART RATE: 96 BPM | WEIGHT: 144.7 LBS | SYSTOLIC BLOOD PRESSURE: 118 MMHG | TEMPERATURE: 98.4 F | OXYGEN SATURATION: 99 %

## 2025-03-27 DIAGNOSIS — F90.2 ATTENTION DEFICIT HYPERACTIVITY DISORDER (ADHD), COMBINED TYPE: ICD-10-CM

## 2025-03-27 DIAGNOSIS — E55.9 AVITAMINOSIS D: ICD-10-CM

## 2025-03-27 DIAGNOSIS — G25.81 RLS (RESTLESS LEGS SYNDROME): ICD-10-CM

## 2025-03-27 DIAGNOSIS — F51.05 INSOMNIA DUE TO OTHER MENTAL DISORDER: ICD-10-CM

## 2025-03-27 DIAGNOSIS — N20.0 NEPHROLITHIASIS: ICD-10-CM

## 2025-03-27 DIAGNOSIS — Z23 NEED FOR COVID-19 VACCINE: ICD-10-CM

## 2025-03-27 DIAGNOSIS — F41.1 GAD (GENERALIZED ANXIETY DISORDER): ICD-10-CM

## 2025-03-27 DIAGNOSIS — K58.1 IRRITABLE BOWEL SYNDROME WITH CONSTIPATION: ICD-10-CM

## 2025-03-27 DIAGNOSIS — F99 INSOMNIA DUE TO OTHER MENTAL DISORDER: ICD-10-CM

## 2025-03-27 DIAGNOSIS — N60.02 BREAST CYST, LEFT: ICD-10-CM

## 2025-03-27 DIAGNOSIS — Z91.09 ENVIRONMENTAL ALLERGIES: ICD-10-CM

## 2025-03-27 DIAGNOSIS — Z23 NEED FOR DIPHTHERIA-TETANUS-PERTUSSIS (TDAP) VACCINE: ICD-10-CM

## 2025-03-27 DIAGNOSIS — G43.109 MIGRAINE WITH AURA AND WITHOUT STATUS MIGRAINOSUS, NOT INTRACTABLE: ICD-10-CM

## 2025-03-27 DIAGNOSIS — L70.0 ACNE VULGARIS: ICD-10-CM

## 2025-03-27 DIAGNOSIS — Z00.01 ENCOUNTER FOR GENERAL ADULT MEDICAL EXAMINATION WITH ABNORMAL FINDINGS: Primary | ICD-10-CM

## 2025-03-27 LAB
25(OH)D3+25(OH)D2 SERPL-MCNC: 126 NG/ML (ref 30–100)
ALBUMIN SERPL-MCNC: 4.8 G/DL (ref 3.5–5.2)
ALBUMIN/GLOB SERPL: 1.7 G/DL
ALP SERPL-CCNC: 60 U/L (ref 39–117)
ALT SERPL-CCNC: 17 U/L (ref 1–33)
APPEARANCE UR: CLEAR
AST SERPL-CCNC: 18 U/L (ref 1–32)
BACTERIA #/AREA URNS HPF: ABNORMAL /[HPF]
BACTERIA UR CULT: ABNORMAL
BACTERIA UR CULT: ABNORMAL
BASOPHILS # BLD AUTO: 0.02 10*3/MM3 (ref 0–0.2)
BASOPHILS NFR BLD AUTO: 0.4 % (ref 0–1.5)
BILIRUB SERPL-MCNC: 0.7 MG/DL (ref 0–1.2)
BILIRUB UR QL STRIP: NEGATIVE
BUN SERPL-MCNC: 16 MG/DL (ref 6–20)
BUN/CREAT SERPL: 25 (ref 7–25)
CALCIUM SERPL-MCNC: 9.5 MG/DL (ref 8.6–10.5)
CASTS URNS QL MICRO: ABNORMAL /LPF
CHLORIDE SERPL-SCNC: 100 MMOL/L (ref 98–107)
CHOLEST SERPL-MCNC: 186 MG/DL (ref 0–200)
CHOLEST/HDLC SERPL: 3.32 {RATIO}
CO2 SERPL-SCNC: 27.3 MMOL/L (ref 22–29)
COLOR UR: YELLOW
CREAT SERPL-MCNC: 0.64 MG/DL (ref 0.57–1)
EGFRCR SERPLBLD CKD-EPI 2021: 114.7 ML/MIN/1.73
EOSINOPHIL # BLD AUTO: 0.05 10*3/MM3 (ref 0–0.4)
EOSINOPHIL NFR BLD AUTO: 1.1 % (ref 0.3–6.2)
EPI CELLS #/AREA URNS HPF: ABNORMAL /HPF (ref 0–10)
ERYTHROCYTE [DISTWIDTH] IN BLOOD BY AUTOMATED COUNT: 12.3 % (ref 12.3–15.4)
GLOBULIN SER CALC-MCNC: 2.9 GM/DL
GLUCOSE SERPL-MCNC: 72 MG/DL (ref 65–99)
GLUCOSE UR QL STRIP: NEGATIVE
HBA1C MFR BLD: 4.9 % (ref 4.8–5.6)
HCT VFR BLD AUTO: 42.7 % (ref 34–46.6)
HDLC SERPL-MCNC: 56 MG/DL (ref 40–60)
HGB BLD-MCNC: 14.6 G/DL (ref 12–15.9)
HGB UR QL STRIP: NEGATIVE
IMM GRANULOCYTES # BLD AUTO: 0.02 10*3/MM3 (ref 0–0.05)
IMM GRANULOCYTES NFR BLD AUTO: 0.4 % (ref 0–0.5)
KETONES UR QL STRIP: ABNORMAL
LDLC SERPL CALC-MCNC: 103 MG/DL (ref 0–100)
LEUKOCYTE ESTERASE UR QL STRIP: ABNORMAL
LYMPHOCYTES # BLD AUTO: 1.93 10*3/MM3 (ref 0.7–3.1)
LYMPHOCYTES NFR BLD AUTO: 41.4 % (ref 19.6–45.3)
MCH RBC QN AUTO: 31.9 PG (ref 26.6–33)
MCHC RBC AUTO-ENTMCNC: 34.2 G/DL (ref 31.5–35.7)
MCV RBC AUTO: 93.4 FL (ref 79–97)
MICRO URNS: ABNORMAL
MONOCYTES # BLD AUTO: 0.53 10*3/MM3 (ref 0.1–0.9)
MONOCYTES NFR BLD AUTO: 11.4 % (ref 5–12)
NEUTROPHILS # BLD AUTO: 2.11 10*3/MM3 (ref 1.7–7)
NEUTROPHILS NFR BLD AUTO: 45.3 % (ref 42.7–76)
NITRITE UR QL STRIP: POSITIVE
NRBC BLD AUTO-RTO: 0 /100 WBC (ref 0–0.2)
OTHER ANTIBIOTIC SUSC ISLT: ABNORMAL
PH UR STRIP: 6 [PH] (ref 5–7.5)
PLATELET # BLD AUTO: 271 10*3/MM3 (ref 140–450)
POTASSIUM SERPL-SCNC: 4.3 MMOL/L (ref 3.5–5.2)
PROT SERPL-MCNC: 7.7 G/DL (ref 6–8.5)
PROT UR QL STRIP: ABNORMAL
RBC # BLD AUTO: 4.57 10*6/MM3 (ref 3.77–5.28)
RBC #/AREA URNS HPF: ABNORMAL /HPF (ref 0–2)
SODIUM SERPL-SCNC: 140 MMOL/L (ref 136–145)
SP GR UR STRIP: 1.03 (ref 1–1.03)
TRIGL SERPL-MCNC: 156 MG/DL (ref 0–150)
TSH SERPL DL<=0.005 MIU/L-ACNC: 2.6 UIU/ML (ref 0.27–4.2)
URINALYSIS REFLEX: ABNORMAL
UROBILINOGEN UR STRIP-MCNC: 1 MG/DL (ref 0.2–1)
VLDLC SERPL CALC-MCNC: 27 MG/DL (ref 5–40)
WBC # BLD AUTO: 4.66 10*3/MM3 (ref 3.4–10.8)
WBC #/AREA URNS HPF: ABNORMAL /HPF (ref 0–5)

## 2025-03-27 RX ORDER — VALACYCLOVIR HYDROCHLORIDE 500 MG/1
500 TABLET, FILM COATED ORAL DAILY PRN
COMMUNITY
Start: 2025-02-15

## 2025-03-27 RX ORDER — DEXTROAMPHETAMINE SULFATE 20 MG/1
1 TABLET ORAL EVERY MORNING
COMMUNITY
Start: 2025-03-20

## 2025-03-27 NOTE — LETTER
[facility]  Vaccine Consent Form    Patient Name:  Carito Ritter  Patient :  1985     Vaccine(s) Ordered    COVID-19 (Pfizer) 12yrs+ (COMIRNATY)  Tdap Vaccine Greater Than or Equal To 8yo IM        Screening Checklist  The following questions should be completed prior to vaccination. If you answer “yes” to any question, it does not necessarily mean you should not be vaccinated. It just means we may need to clarify or ask more questions. If a question is unclear, please ask your healthcare provider to explain it.    Yes No   Any fever or moderate to severe illness today (mild illness and/or antibiotic treatment are not contraindications)?     Do you have a history of a serious reaction to any previous vaccinations, such as anaphylaxis, encephalopathy within 7 days, Guillain-[place] syndrome within 6 weeks, seizure?     Have you received any live vaccine(s) (e.g MMR, KELLI) or any other vaccines in the last month (to ensure duplicate doses aren't given)?     Do you have an anaphylactic allergy to latex (DTaP, DTaP-IPV, Hep A, Hep B, MenB, RV, Td, Tdap), baker’s yeast (Hep B, HPV), polysorbates (RSV, nirsevimab, PCV 20, Rotavirrus, Tdap, Shingrix), or gelatin (KELLI, MMR)?     Do you have an anaphylactic allergy to neomycin (Rabies, KELLI, MMR, IPV, Hep A), polymyxin B (IPV), or streptomycin (IPV)?      Any cancer, leukemia, AIDS, or other immune system disorder? (KELLI, MMR, RV)     Do you have a parent, brother, or sister with an immune system problem (if immune competence of vaccine recipient clinically verified, can proceed)? (MMR, KELLI)     Any recent steroid treatments for >2 weeks, chemotherapy, or radiation treatment? (KELLI, MMR)     Have you received antibody-containing blood transfusions or IVIG in the past 11 months (recommended interval is dependent on product)? (MMR, KELLI)     Have you taken antiviral drugs (acyclovir, famciclovir, valacyclovir for KELLI) in the last 24 or 48 hours, respectively?     "  Are you pregnant or planning to become pregnant within 1 month? (KELLI, MMR, HPV, IPV, MenB, Abrexvy; For Hep B- refer to Engerix-B; For RSV - Abrysvo is indicated for 32-36 weeks of pregnancy from September to January)     For infants, have you ever been told your child has had intussusception or a medical emergency involving obstruction of the intestine (Rotavirus)? If not for an infant, can skip this question.         *Ordering Physicians/APC should be consulted if \"yes\" is checked by the patient or guardian above.  I have received, read, and understand the Vaccine Information Statement (VIS) for each vaccine ordered.  I have considered my or my child's health status as well as the health status of my close contacts.  I have taken the opportunity to discuss my vaccine questions with my or my child's health care provider.   I have requested that the ordered vaccine(s) be given to me or my child.  I understand the benefits and risks of the vaccines.  I understand that I should remain in the clinic for 15 minutes after receiving the vaccine(s).  _________________________________________________________  Signature of Patient or Parent/Legal Guardian ____________________  Date   "

## 2025-03-27 NOTE — PROGRESS NOTES
"Annual Exam (CPE. Review of medical issues.)      HPI  Carito Ritter is a 40 y.o. female RTC in yearly CPE, review of medical issues:   Has overall been doing well. Had 'tummy tuck' last year and pleased with outcome.  No complications. Saw Dr. Huang.     1. Kidney stone  - no recurrence. Is doing well with hydration.  2. L breast cyst - has had Mammo and US with Gyn. \"They are monitoring\".  Has some pain intermittently. No skin changes.   3. ADHD with Focalin and Ritalin moderate gene-drug interactions on GeneSight testing RTC In f/u - doing well on BID dextroamphetamine, per psych, Dr. Mcduffie.  No dose changes.   4. Anxiety/ DAPHNE, recurrence of anxiety in 2016 with new job, with prior diagnosis in past- overall been 'off and on'.  'Manageable'.  Finished seeing talk therapist but not seen in last 6 months. Completed PTSD program and then took break.   5.  IBS-C  - managed on Linzess. \"It is good\".  Has issues with missing med from pharmacy fills  and 'reminds me I need it'.   6. RLS - long term issue, was managed per neuro on Clonopin nightly --> ropinirole. Iron replete 2022. Is ropinirole, no side effects.  No compulsive behaviors.    7. Migraine HA - managed per HA clinic at [place], on Botox. Helpful for pt. Gets 12 weeks apart.   8. Acne, forehead - off Retin-A cream/ topicals. Controlled on Spironolactone, per derm.   9. Environmental Allergies - controlled on immunotherapy weekly per allergy.  No other meds used.   10. HM - missed and Flu and COVID this Fall.    Review of Systems   Constitutional: Negative for chills, fever, weight gain and weight loss.   HENT:  Negative for congestion, hearing loss and sore throat.    Eyes:  Negative for discharge, double vision, pain and redness.   Cardiovascular:  Negative for chest pain, dyspnea on exertion, irregular heartbeat, leg swelling, near-syncope, palpitations and syncope.   Respiratory:  Negative for cough and shortness of breath.    Endocrine: " Negative for polydipsia, polyphagia and polyuria.   Hematologic/Lymphatic: Negative for bleeding problem. Does not bruise/bleed easily.   Skin:  Negative for rash and suspicious lesions.   Gastrointestinal:  Positive for constipation (controlled). Negative for diarrhea, heartburn, nausea and vomiting.   Genitourinary:  Negative for dysuria, frequency, hematuria, incomplete emptying and nocturia.   Neurological:  Negative for dizziness, headaches and light-headedness.   Psychiatric/Behavioral:  Negative for depression. The patient has insomnia (controlled). The patient is not nervous/anxious.    Allergic/Immunologic: Positive for environmental allergies. Negative for persistent infections.       Problem List:    Patient Active Problem List   Diagnosis    DAPHNE (generalized anxiety disorder)    Avitaminosis D    Dermatofibroma of lower leg    Migraine with aura and without status migrainosus, not intractable    Irritable bowel syndrome with constipation    RLS (restless legs syndrome)    Acne vulgaris    Environmental allergies    Anorgasmia of female    Menorrhagia with regular cycle    ADHD (attention deficit hyperactivity disorder)    Insomnia due to other mental disorder    Nephrolithiasis    Breast cyst, left       Medical History:    Past Medical History:   Diagnosis Date    Acne vulgaris 03/04/2019    Sees derm annually     ADHD (attention deficit hyperactivity disorder)     Allergic     Anxiety     Dysplastic skin lesion     on back, s/p Moh's 2021    Environmental allergies 03/04/2019    Generalized anxiety disorder     Hx of hordeolum     recurrent in L eye    IBS (irritable bowel syndrome)     Migraine headache     Panic attack 12/06/2016    Vitamin D deficiency         Social History:    Social History     Socioeconomic History    Marital status:     Number of children: 2   Tobacco Use    Smoking status: Never     Passive exposure: Never    Smokeless tobacco: Never   Vaping Use    Vaping status: Never  Used   Substance and Sexual Activity    Alcohol use: Yes     Comment: 1-2 nori week --> 4-5 drinks/ week    Drug use: No    Sexual activity: Yes     Partners: Male     Birth control/protection: Surgical     Comment:  only; no hx STD's       Family History:   Family History   Problem Relation Age of Onset    Asthma Mother     Breast cancer Mother         s/p mastectomy, stage 0    Hyperthyroidism Father     Nephrolithiasis Father     Diabetes Maternal Grandmother     Lung cancer Maternal Grandfather     Alcohol abuse Maternal Grandfather     Breast cancer Paternal Grandmother     Diabetes Paternal Grandmother     Hyperthyroidism Paternal Grandmother     ADD / ADHD Son     Malig Hyperthermia Neg Hx        Surgical History:   Past Surgical History:   Procedure Laterality Date    ABDOMINOPLASTY  04/2024    Dr. Huang    ADENOIDECTOMY      BREAST AUGMENTATION  2007    BREAST CAPSULOTOMY, IMPLANT REVISION  06/2020    BREAST SURGERY      cyst aspiration Right breast 2012    BROW LIFT AND BLEPHAROPLASTY  06/2020    COLONOSCOPY N/A 01/31/2018    One 7 mm polyp in the transverse colon, NBIH. Path: Hyperplastic polyp.     EXTERNAL EAR SURGERY      ear pining 2013 cosmetic    EYE SURGERY      lasik    EYE SURGERY Left 06/2022    PRK revision    MOHS SURGERY  2021    dysplastic lesion on back    TONSILLECTOMY AND ADENOIDECTOMY      2005         Current Outpatient Medications:     ALLERGY INJECTION SERUM OFFICE ADMINISTERED, Inject  under the skin into the appropriate area as directed 1 (One) Time Per Week., Disp: , Rfl:     Auvi-Q 0.3 MG/0.3ML solution auto-injector injection, INJECT AS NEEDED FOR SEVERE ALLERGIC REACTION INCLUDING ANAPHYLAXIS AS DIRECTED, Disp: , Rfl:     Dextroamphetamine Sulfate 15 MG tablet, Take 1 tablet by mouth Every Evening., Disp: , Rfl:     Dextroamphetamine Sulfate 20 MG tablet, Take 1 tablet by mouth Every Morning., Disp: , Rfl:     Diclofenac Potassium 50 MG pack, Take  by mouth As Needed  (migraines)., Disp: , Rfl:     Linzess 145 MCG capsule capsule, TAKE ONE (1) CAPSULE BY MOUTH EVERY MORNING BEFORE BREAKFAST., Disp: 90 capsule, Rfl: 2    Melatonin 1 MG chewable tablet, Chew., Disp: , Rfl:     OnabotulinumtoxinA, Cosmetic, (BOTOX COSMETIC IM), Every 3 (Three) Months., Disp: , Rfl:     rOPINIRole (REQUIP) 0.5 MG tablet, Take 1 tablet by mouth Every Night. Take 1 hour before bedtime., Disp: , Rfl:     spironolactone (ALDACTONE) 50 MG tablet, , Disp: , Rfl:     valACYclovir (VALTREX) 500 MG tablet, Take 1 tablet by mouth Daily As Needed., Disp: , Rfl:     Vitals:    03/27/25 1508   BP: 118/74   Pulse: 96   Temp: 98.4 °F (36.9 °C)   SpO2: 99%     Body mass index is 24.08 kg/m².    Physical Exam  Vitals reviewed.   Constitutional:       General: She is not in acute distress.     Appearance: Normal appearance. She is well-developed. She is not ill-appearing or toxic-appearing.   HENT:      Head: Normocephalic and atraumatic.      Right Ear: Hearing, tympanic membrane, ear canal and external ear normal. There is no impacted cerumen.      Left Ear: Hearing, tympanic membrane, ear canal and external ear normal. There is no impacted cerumen.      Nose: Nose normal.      Mouth/Throat:      Mouth: Mucous membranes are moist. No injury or oral lesions.      Dentition: Does not have dentures.      Tongue: No lesions.      Pharynx: Oropharynx is clear. Uvula midline. No pharyngeal swelling, oropharyngeal exudate, posterior oropharyngeal erythema or uvula swelling.   Eyes:      General: Lids are normal. No scleral icterus.        Right eye: No discharge.         Left eye: No discharge.      Extraocular Movements: Extraocular movements intact.      Conjunctiva/sclera: Conjunctivae normal.      Pupils: Pupils are equal, round, and reactive to light.   Neck:      Thyroid: No thyroid mass or thyromegaly.      Vascular: No carotid bruit.      Trachea: Trachea normal.   Cardiovascular:      Rate and Rhythm: Normal rate  and regular rhythm.      Pulses:           Radial pulses are 2+ on the right side and 2+ on the left side.        Dorsalis pedis pulses are 2+ on the right side and 2+ on the left side.        Posterior tibial pulses are 2+ on the right side and 2+ on the left side.      Heart sounds: Normal heart sounds, S1 normal and S2 normal. No murmur heard.     No friction rub. No gallop.   Pulmonary:      Effort: Pulmonary effort is normal. No respiratory distress.      Breath sounds: Normal breath sounds. No wheezing, rhonchi or rales.   Abdominal:      General: Bowel sounds are normal. There is no distension.      Palpations: Abdomen is soft. There is no mass.      Tenderness: There is no abdominal tenderness. There is no guarding or rebound.   Musculoskeletal:         General: Normal range of motion.      Right shoulder: No tenderness, bony tenderness or crepitus. Normal range of motion.      Left shoulder: No tenderness, bony tenderness or crepitus. Normal range of motion.      Right hand: No tenderness or bony tenderness. Normal range of motion. Normal strength.      Left hand: No tenderness or bony tenderness. Normal range of motion. Normal strength.      Cervical back: Full passive range of motion without pain. No rigidity. No muscular tenderness. Normal range of motion.      Right lower leg: No edema.      Left lower leg: No edema.      Right foot: Normal range of motion. No deformity or bunion.      Left foot: Normal range of motion. No deformity or bunion.   Feet:      Right foot:      Skin integrity: No ulcer, blister or skin breakdown.      Left foot:      Skin integrity: No ulcer, blister or skin breakdown.   Lymphadenopathy:      Cervical: No cervical adenopathy.      Upper Body:      Right upper body: No supraclavicular, axillary or pectoral adenopathy.      Left upper body: No supraclavicular, axillary or pectoral adenopathy.      Lower Body: No right inguinal adenopathy. No left inguinal adenopathy.   Skin:      General: Skin is warm and dry.      Findings: No rash.   Neurological:      Mental Status: She is alert and oriented to person, place, and time.      Cranial Nerves: No cranial nerve deficit, dysarthria or facial asymmetry.      Sensory: No sensory deficit.      Motor: No weakness, tremor, atrophy or abnormal muscle tone.      Gait: Gait normal.      Deep Tendon Reflexes: Reflexes are normal and symmetric.      Reflex Scores:       Patellar reflexes are 2+ on the right side and 2+ on the left side.       Achilles reflexes are 2+ on the right side and 2+ on the left side.  Psychiatric:         Mood and Affect: Mood normal.         Behavior: Behavior normal.         Thought Content: Thought content normal.         Assessment/ Plan  Diagnoses and all orders for this visit:    Encounter for general adult medical examination with abnormal findings    RLS (restless legs syndrome)    Nephrolithiasis    Irritable bowel syndrome with constipation    Insomnia due to other mental disorder    DAPHNE (generalized anxiety disorder)    Environmental allergies    Breast cyst, left    Avitaminosis D    Attention deficit hyperactivity disorder (ADHD), combined type    Acne vulgaris    Migraine with aura and without status migrainosus, not intractable    Need for COVID-19 vaccine  -     COVID-19 (Pfizer) 12yrs+ (COMIRNATY)    Need for diphtheria-tetanus-pertussis (Tdap) vaccine  -     Tdap Vaccine Greater Than or Equal To 8yo IM    Other orders  -     Dextroamphetamine Sulfate 20 MG tablet; Take 1 tablet by mouth Every Morning.  -     valACYclovir (VALTREX) 500 MG tablet; Take 1 tablet by mouth Daily As Needed.  -     Melatonin 1 MG chewable tablet; Chew.        Return in about 1 year (around 3/27/2026) for Annual physical.      Discussion:  Carito Ritter is a 40 y.o. female RTC in yearly CPE, review of medical issues:     1.  Nephrolithiasis -last event 2024, passed spontaneously.  No recurrence.  UA without hematuria today.   In past, Ca++oxalate crystals in urine, suggests etiology s/p Diet handout provided.  C/W aggressive hydration.  2. Asymptomatic bacteriuria -recurrent issue (noted in the past at GYN).  Noted on UA today.  Patient clear she has no SX at this time and will defer ABX.  Knows to call if develops any UTI SX.  3. Breast cyst, L, with intermittent pain - new issue 2024, s/p mammo and US per GYN.   4. Insomnia - long term issue, s/p psychology tx with Dr. Sung, issue largely resolve.  Off Ambien.  Is using melatonin and low-dose THC gummy most nights.    5. ADHD with Focalin and Ritalin moderate gene-drug interactions on GeneSight testing RTC In f/u - doing well on BID dextroamphetamine, per psych, Dr. Mcduffie.    6. Anxiety/ DAPHNE, recurrence of anxiety in 2016 with new job, with prior diagnosis in past -variable SX overall, completed PTSD course with talk therapist and currently on break from talk therapy.  Augment exercise.    7. IBS-C - well managed on Linzess, SX with missed meds.  8. RLS - long term issue, was managed per neuro on Clonopin nightly --> ropinirole (good tolerance, no compulsive behaviors). Iron replete 2022.  9. Migraine HA - managed per HA clinic at [facility], on Botox q 12 weeks. PRN Cambia packets abortive strategy.   10. Acne, forehead - off Retin-A cream/ topicals. Controlled on Spironolactone, per derm.   11. Menorrhagia - failed IUD. S/P uterine ablation.    12. Environmental Allergies - controlled on immunotherapy weekly per allergy.   13. Vitamin D deficiency -noted over time, patient has been compliant with OTC dosing but has been using 5000I.U. daily that she orders online.  Vitamin D level overtly high today at 126.  Advised for total supplement cessation over the summer/candy months.  May restart vitamin D at 1000I.U. in the fall.  Counseled, patient voiced understanding.  14. HM - labs d/w pt; Flu - next season; Tdap/COVID update - today; Hep A/ COVID primary - UTD; Pap/ Breast Exam per  Gyn, sees annually; C-scope 1/2018 (1 hyperplastic polyp), repeat age 44; Hep C Ab (-) 3/2021; c/w exercise    RTC 1 year CPE, F labs prior

## (undated) DEVICE — SNAR POLYP SENSATION STDOVL 27 240 BX40

## (undated) DEVICE — THE TORRENT IRRIGATION SCOPE CONNECTOR IS USED WITH THE TORRENT IRRIGATION TUBING TO PROVIDE IRRIGATION FLUIDS SUCH AS STERILE WATER DURING GASTROINTESTINAL ENDOSCOPIC PROCEDURES WHEN USED IN CONJUNCTION WITH AN IRRIGATION PUMP (OR ELECTROSURGICAL UNIT).: Brand: TORRENT

## (undated) DEVICE — CANN NASL CO2 TRULINK W/O2 A/

## (undated) DEVICE — Device: Brand: DEFENDO AIR/WATER/SUCTION AND BIOPSY VALVE

## (undated) DEVICE — THE SINGLE USE ETRAP – POLYP TRAP IS USED FOR SUCTION RETRIEVAL OF ENDOSCOPICALLY REMOVED POLYPS.: Brand: ETRAP

## (undated) DEVICE — TUBING, SUCTION, 1/4" X 10', STRAIGHT: Brand: MEDLINE

## (undated) DEVICE — TBG 02 CRUSH RESIST LF CLR 7FT